# Patient Record
Sex: MALE | Race: WHITE | NOT HISPANIC OR LATINO | URBAN - METROPOLITAN AREA
[De-identification: names, ages, dates, MRNs, and addresses within clinical notes are randomized per-mention and may not be internally consistent; named-entity substitution may affect disease eponyms.]

---

## 2022-01-01 ENCOUNTER — INPATIENT (INPATIENT)
Facility: HOSPITAL | Age: 87
LOS: 5 days | Discharge: EXPIRED | End: 2022-07-21
Attending: STUDENT IN AN ORGANIZED HEALTH CARE EDUCATION/TRAINING PROGRAM | Admitting: STUDENT IN AN ORGANIZED HEALTH CARE EDUCATION/TRAINING PROGRAM

## 2022-01-01 ENCOUNTER — INPATIENT (INPATIENT)
Facility: HOSPITAL | Age: 87
LOS: 8 days | Discharge: NURSING, REHAB OR LONG-TERM CARE | End: 2022-06-29
Attending: INTERNAL MEDICINE | Admitting: INTERNAL MEDICINE
Payer: MEDICARE

## 2022-01-01 VITALS
SYSTOLIC BLOOD PRESSURE: 117 MMHG | OXYGEN SATURATION: 99 % | HEART RATE: 74 BPM | DIASTOLIC BLOOD PRESSURE: 60 MMHG | RESPIRATION RATE: 18 BRPM | TEMPERATURE: 98 F

## 2022-01-01 VITALS
RESPIRATION RATE: 24 BRPM | DIASTOLIC BLOOD PRESSURE: 61 MMHG | SYSTOLIC BLOOD PRESSURE: 138 MMHG | HEIGHT: 61 IN | OXYGEN SATURATION: 98 % | TEMPERATURE: 98 F | HEART RATE: 92 BPM

## 2022-01-01 VITALS
OXYGEN SATURATION: 100 % | DIASTOLIC BLOOD PRESSURE: 67 MMHG | HEART RATE: 97 BPM | RESPIRATION RATE: 18 BRPM | TEMPERATURE: 97 F | SYSTOLIC BLOOD PRESSURE: 137 MMHG

## 2022-01-01 VITALS — HEART RATE: 98 BPM | OXYGEN SATURATION: 100 %

## 2022-01-01 DIAGNOSIS — G93.40 ENCEPHALOPATHY, UNSPECIFIED: ICD-10-CM

## 2022-01-01 DIAGNOSIS — J84.10 PULMONARY FIBROSIS, UNSPECIFIED: ICD-10-CM

## 2022-01-01 DIAGNOSIS — Z71.89 OTHER SPECIFIED COUNSELING: ICD-10-CM

## 2022-01-01 DIAGNOSIS — Z98.49 CATARACT EXTRACTION STATUS, UNSPECIFIED EYE: Chronic | ICD-10-CM

## 2022-01-01 DIAGNOSIS — R10.9 UNSPECIFIED ABDOMINAL PAIN: ICD-10-CM

## 2022-01-01 DIAGNOSIS — Z98.890 OTHER SPECIFIED POSTPROCEDURAL STATES: Chronic | ICD-10-CM

## 2022-01-01 DIAGNOSIS — E46 UNSPECIFIED PROTEIN-CALORIE MALNUTRITION: ICD-10-CM

## 2022-01-01 DIAGNOSIS — L89.321 PRESSURE ULCER OF LEFT BUTTOCK, STAGE 1: ICD-10-CM

## 2022-01-01 DIAGNOSIS — F32.9 MAJOR DEPRESSIVE DISORDER, SINGLE EPISODE, UNSPECIFIED: ICD-10-CM

## 2022-01-01 DIAGNOSIS — N12 TUBULO-INTERSTITIAL NEPHRITIS, NOT SPECIFIED AS ACUTE OR CHRONIC: ICD-10-CM

## 2022-01-01 DIAGNOSIS — R73.9 HYPERGLYCEMIA, UNSPECIFIED: ICD-10-CM

## 2022-01-01 DIAGNOSIS — E11.9 TYPE 2 DIABETES MELLITUS WITHOUT COMPLICATIONS: ICD-10-CM

## 2022-01-01 DIAGNOSIS — J96.01 ACUTE RESPIRATORY FAILURE WITH HYPOXIA: ICD-10-CM

## 2022-01-01 DIAGNOSIS — I10 ESSENTIAL (PRIMARY) HYPERTENSION: ICD-10-CM

## 2022-01-01 DIAGNOSIS — N39.0 URINARY TRACT INFECTION, SITE NOT SPECIFIED: ICD-10-CM

## 2022-01-01 DIAGNOSIS — R13.10 DYSPHAGIA, UNSPECIFIED: ICD-10-CM

## 2022-01-01 DIAGNOSIS — Z29.9 ENCOUNTER FOR PROPHYLACTIC MEASURES, UNSPECIFIED: ICD-10-CM

## 2022-01-01 DIAGNOSIS — E16.2 HYPOGLYCEMIA, UNSPECIFIED: ICD-10-CM

## 2022-01-01 DIAGNOSIS — R53.2 FUNCTIONAL QUADRIPLEGIA: ICD-10-CM

## 2022-01-01 DIAGNOSIS — Z91.89 OTHER SPECIFIED PERSONAL RISK FACTORS, NOT ELSEWHERE CLASSIFIED: ICD-10-CM

## 2022-01-01 DIAGNOSIS — I48.20 CHRONIC ATRIAL FIBRILLATION, UNSPECIFIED: ICD-10-CM

## 2022-01-01 DIAGNOSIS — Z51.5 ENCOUNTER FOR PALLIATIVE CARE: ICD-10-CM

## 2022-01-01 LAB
-  AMIKACIN: SIGNIFICANT CHANGE UP
-  AZTREONAM: SIGNIFICANT CHANGE UP
-  CEFEPIME: SIGNIFICANT CHANGE UP
-  CEFTAZIDIME: SIGNIFICANT CHANGE UP
-  CIPROFLOXACIN: SIGNIFICANT CHANGE UP
-  GENTAMICIN: SIGNIFICANT CHANGE UP
-  IMIPENEM: SIGNIFICANT CHANGE UP
-  LEVOFLOXACIN: SIGNIFICANT CHANGE UP
-  MEROPENEM: SIGNIFICANT CHANGE UP
-  PIPERACILLIN/TAZOBACTAM: SIGNIFICANT CHANGE UP
-  TOBRAMYCIN: SIGNIFICANT CHANGE UP
24R-OH-CALCIDIOL SERPL-MCNC: 29.9 NG/ML — LOW (ref 30–80)
A1C WITH ESTIMATED AVERAGE GLUCOSE RESULT: 6.2 % — HIGH (ref 4–5.6)
A1C WITH ESTIMATED AVERAGE GLUCOSE RESULT: 6.5 % — HIGH (ref 4–5.6)
ALBUMIN SERPL ELPH-MCNC: 2.4 G/DL — LOW (ref 3.3–5)
ALBUMIN SERPL ELPH-MCNC: 2.6 G/DL — LOW (ref 3.3–5)
ALBUMIN SERPL ELPH-MCNC: 2.7 G/DL — LOW (ref 3.3–5)
ALBUMIN SERPL ELPH-MCNC: 2.7 G/DL — LOW (ref 3.3–5)
ALBUMIN SERPL ELPH-MCNC: 2.8 G/DL — LOW (ref 3.3–5)
ALBUMIN SERPL ELPH-MCNC: 2.8 G/DL — LOW (ref 3.3–5)
ALBUMIN SERPL ELPH-MCNC: 2.9 G/DL — LOW (ref 3.3–5)
ALBUMIN SERPL ELPH-MCNC: 3 G/DL — LOW (ref 3.3–5)
ALP SERPL-CCNC: 102 U/L — SIGNIFICANT CHANGE UP (ref 40–120)
ALP SERPL-CCNC: 106 U/L — SIGNIFICANT CHANGE UP (ref 40–120)
ALP SERPL-CCNC: 132 U/L — HIGH (ref 40–120)
ALP SERPL-CCNC: 136 U/L — HIGH (ref 40–120)
ALP SERPL-CCNC: 137 U/L — HIGH (ref 40–120)
ALP SERPL-CCNC: 145 U/L — HIGH (ref 40–120)
ALP SERPL-CCNC: 145 U/L — HIGH (ref 40–120)
ALP SERPL-CCNC: 161 U/L — HIGH (ref 40–120)
ALT FLD-CCNC: 15 U/L — SIGNIFICANT CHANGE UP (ref 4–41)
ALT FLD-CCNC: 22 U/L — SIGNIFICANT CHANGE UP (ref 4–41)
ALT FLD-CCNC: 29 U/L — SIGNIFICANT CHANGE UP (ref 4–41)
ALT FLD-CCNC: 29 U/L — SIGNIFICANT CHANGE UP (ref 4–41)
ALT FLD-CCNC: 30 U/L — SIGNIFICANT CHANGE UP (ref 4–41)
ALT FLD-CCNC: 31 U/L — SIGNIFICANT CHANGE UP (ref 4–41)
ALT FLD-CCNC: 31 U/L — SIGNIFICANT CHANGE UP (ref 4–41)
ALT FLD-CCNC: 37 U/L — SIGNIFICANT CHANGE UP (ref 4–41)
AMMONIA BLD-MCNC: 38 UMOL/L — SIGNIFICANT CHANGE UP (ref 11–55)
ANION GAP SERPL CALC-SCNC: 10 MMOL/L — SIGNIFICANT CHANGE UP (ref 7–14)
ANION GAP SERPL CALC-SCNC: 10 MMOL/L — SIGNIFICANT CHANGE UP (ref 7–14)
ANION GAP SERPL CALC-SCNC: 14 MMOL/L — SIGNIFICANT CHANGE UP (ref 7–14)
ANION GAP SERPL CALC-SCNC: 15 MMOL/L — HIGH (ref 7–14)
ANION GAP SERPL CALC-SCNC: 5 MMOL/L — LOW (ref 7–14)
ANION GAP SERPL CALC-SCNC: 5 MMOL/L — LOW (ref 7–14)
ANION GAP SERPL CALC-SCNC: 6 MMOL/L — LOW (ref 7–14)
ANION GAP SERPL CALC-SCNC: 6 MMOL/L — LOW (ref 7–14)
ANION GAP SERPL CALC-SCNC: 7 MMOL/L — SIGNIFICANT CHANGE UP (ref 7–14)
ANION GAP SERPL CALC-SCNC: 8 MMOL/L — SIGNIFICANT CHANGE UP (ref 7–14)
ANION GAP SERPL CALC-SCNC: 9 MMOL/L — SIGNIFICANT CHANGE UP (ref 7–14)
APPEARANCE UR: ABNORMAL
APPEARANCE UR: CLEAR — SIGNIFICANT CHANGE UP
APTT BLD: 24.1 SEC — LOW (ref 27–36.3)
AST SERPL-CCNC: 10 U/L — SIGNIFICANT CHANGE UP (ref 4–40)
AST SERPL-CCNC: 12 U/L — SIGNIFICANT CHANGE UP (ref 4–40)
AST SERPL-CCNC: 15 U/L — SIGNIFICANT CHANGE UP (ref 4–40)
AST SERPL-CCNC: 15 U/L — SIGNIFICANT CHANGE UP (ref 4–40)
AST SERPL-CCNC: 17 U/L — SIGNIFICANT CHANGE UP (ref 4–40)
AST SERPL-CCNC: 19 U/L — SIGNIFICANT CHANGE UP (ref 4–40)
AST SERPL-CCNC: 23 U/L — SIGNIFICANT CHANGE UP (ref 4–40)
AST SERPL-CCNC: 29 U/L — SIGNIFICANT CHANGE UP (ref 4–40)
BACTERIA # UR AUTO: ABNORMAL
BASE EXCESS BLDA CALC-SCNC: 10 MMOL/L — HIGH (ref -2–3)
BASE EXCESS BLDA CALC-SCNC: 10.1 MMOL/L — HIGH (ref -2–3)
BASE EXCESS BLDA CALC-SCNC: 11.5 MMOL/L — HIGH (ref -2–3)
BASE EXCESS BLDA CALC-SCNC: 8.2 MMOL/L — HIGH (ref -2–3)
BASE EXCESS BLDA CALC-SCNC: 9.8 MMOL/L — HIGH (ref -2–3)
BASE EXCESS BLDV CALC-SCNC: 9.4 MMOL/L — HIGH (ref -2–3)
BASOPHILS # BLD AUTO: 0 K/UL — SIGNIFICANT CHANGE UP (ref 0–0.2)
BASOPHILS # BLD AUTO: 0.01 K/UL — SIGNIFICANT CHANGE UP (ref 0–0.2)
BASOPHILS # BLD AUTO: 0.01 K/UL — SIGNIFICANT CHANGE UP (ref 0–0.2)
BASOPHILS # BLD AUTO: 0.02 K/UL — SIGNIFICANT CHANGE UP (ref 0–0.2)
BASOPHILS # BLD AUTO: 0.04 K/UL — SIGNIFICANT CHANGE UP (ref 0–0.2)
BASOPHILS # BLD AUTO: 0.16 K/UL — SIGNIFICANT CHANGE UP (ref 0–0.2)
BASOPHILS NFR BLD AUTO: 0 % — SIGNIFICANT CHANGE UP (ref 0–2)
BASOPHILS NFR BLD AUTO: 0.1 % — SIGNIFICANT CHANGE UP (ref 0–2)
BASOPHILS NFR BLD AUTO: 0.1 % — SIGNIFICANT CHANGE UP (ref 0–2)
BASOPHILS NFR BLD AUTO: 0.2 % — SIGNIFICANT CHANGE UP (ref 0–2)
BASOPHILS NFR BLD AUTO: 0.3 % — SIGNIFICANT CHANGE UP (ref 0–2)
BASOPHILS NFR BLD AUTO: 1.7 % — SIGNIFICANT CHANGE UP (ref 0–2)
BILIRUB DIRECT SERPL-MCNC: 0.3 MG/DL — SIGNIFICANT CHANGE UP (ref 0–0.3)
BILIRUB INDIRECT FLD-MCNC: 0.5 MG/DL — SIGNIFICANT CHANGE UP (ref 0–1)
BILIRUB SERPL-MCNC: 0.3 MG/DL — SIGNIFICANT CHANGE UP (ref 0.2–1.2)
BILIRUB SERPL-MCNC: 0.4 MG/DL — SIGNIFICANT CHANGE UP (ref 0.2–1.2)
BILIRUB SERPL-MCNC: 0.7 MG/DL — SIGNIFICANT CHANGE UP (ref 0.2–1.2)
BILIRUB SERPL-MCNC: 0.7 MG/DL — SIGNIFICANT CHANGE UP (ref 0.2–1.2)
BILIRUB SERPL-MCNC: 0.8 MG/DL — SIGNIFICANT CHANGE UP (ref 0.2–1.2)
BILIRUB UR-MCNC: NEGATIVE — SIGNIFICANT CHANGE UP
BILIRUB UR-MCNC: NEGATIVE — SIGNIFICANT CHANGE UP
BLD GP AB SCN SERPL QL: NEGATIVE — SIGNIFICANT CHANGE UP
BLD GP AB SCN SERPL QL: NEGATIVE — SIGNIFICANT CHANGE UP
BLOOD GAS COMMENTS ARTERIAL: SIGNIFICANT CHANGE UP
BLOOD GAS VENOUS COMPREHENSIVE RESULT: SIGNIFICANT CHANGE UP
BLOOD GAS VENOUS COMPREHENSIVE RESULT: SIGNIFICANT CHANGE UP
BUN SERPL-MCNC: 10 MG/DL — SIGNIFICANT CHANGE UP (ref 7–23)
BUN SERPL-MCNC: 12 MG/DL — SIGNIFICANT CHANGE UP (ref 7–23)
BUN SERPL-MCNC: 12 MG/DL — SIGNIFICANT CHANGE UP (ref 7–23)
BUN SERPL-MCNC: 13 MG/DL — SIGNIFICANT CHANGE UP (ref 7–23)
BUN SERPL-MCNC: 14 MG/DL — SIGNIFICANT CHANGE UP (ref 7–23)
BUN SERPL-MCNC: 15 MG/DL — SIGNIFICANT CHANGE UP (ref 7–23)
BUN SERPL-MCNC: 15 MG/DL — SIGNIFICANT CHANGE UP (ref 7–23)
BUN SERPL-MCNC: 17 MG/DL — SIGNIFICANT CHANGE UP (ref 7–23)
BUN SERPL-MCNC: 18 MG/DL — SIGNIFICANT CHANGE UP (ref 7–23)
BUN SERPL-MCNC: 20 MG/DL — SIGNIFICANT CHANGE UP (ref 7–23)
BUN SERPL-MCNC: 24 MG/DL — HIGH (ref 7–23)
BUN SERPL-MCNC: 27 MG/DL — HIGH (ref 7–23)
CALCIUM SERPL-MCNC: 8 MG/DL — LOW (ref 8.4–10.5)
CALCIUM SERPL-MCNC: 8.6 MG/DL — SIGNIFICANT CHANGE UP (ref 8.4–10.5)
CALCIUM SERPL-MCNC: 8.6 MG/DL — SIGNIFICANT CHANGE UP (ref 8.4–10.5)
CALCIUM SERPL-MCNC: 8.7 MG/DL — SIGNIFICANT CHANGE UP (ref 8.4–10.5)
CALCIUM SERPL-MCNC: 8.7 MG/DL — SIGNIFICANT CHANGE UP (ref 8.4–10.5)
CALCIUM SERPL-MCNC: 8.8 MG/DL — SIGNIFICANT CHANGE UP (ref 8.4–10.5)
CALCIUM SERPL-MCNC: 8.9 MG/DL — SIGNIFICANT CHANGE UP (ref 8.4–10.5)
CALCIUM SERPL-MCNC: 9 MG/DL — SIGNIFICANT CHANGE UP (ref 8.4–10.5)
CALCIUM SERPL-MCNC: 9 MG/DL — SIGNIFICANT CHANGE UP (ref 8.4–10.5)
CALCIUM SERPL-MCNC: 9.1 MG/DL — SIGNIFICANT CHANGE UP (ref 8.4–10.5)
CALCIUM SERPL-MCNC: 9.1 MG/DL — SIGNIFICANT CHANGE UP (ref 8.4–10.5)
CALCIUM SERPL-MCNC: 9.3 MG/DL — SIGNIFICANT CHANGE UP (ref 8.4–10.5)
CALCIUM SERPL-MCNC: 9.3 MG/DL — SIGNIFICANT CHANGE UP (ref 8.4–10.5)
CALCIUM SERPL-MCNC: 9.4 MG/DL — SIGNIFICANT CHANGE UP (ref 8.4–10.5)
CALCIUM SERPL-MCNC: 9.5 MG/DL — SIGNIFICANT CHANGE UP (ref 8.4–10.5)
CALCIUM SERPL-MCNC: 9.5 MG/DL — SIGNIFICANT CHANGE UP (ref 8.4–10.5)
CHLORIDE BLDV-SCNC: 103 MMOL/L — SIGNIFICANT CHANGE UP (ref 96–108)
CHLORIDE SERPL-SCNC: 100 MMOL/L — SIGNIFICANT CHANGE UP (ref 98–107)
CHLORIDE SERPL-SCNC: 101 MMOL/L — SIGNIFICANT CHANGE UP (ref 98–107)
CHLORIDE SERPL-SCNC: 102 MMOL/L — SIGNIFICANT CHANGE UP (ref 98–107)
CHLORIDE SERPL-SCNC: 102 MMOL/L — SIGNIFICANT CHANGE UP (ref 98–107)
CHLORIDE SERPL-SCNC: 103 MMOL/L — SIGNIFICANT CHANGE UP (ref 98–107)
CHLORIDE SERPL-SCNC: 105 MMOL/L — SIGNIFICANT CHANGE UP (ref 98–107)
CHLORIDE SERPL-SCNC: 95 MMOL/L — LOW (ref 98–107)
CHLORIDE SERPL-SCNC: 96 MMOL/L — LOW (ref 98–107)
CHLORIDE SERPL-SCNC: 96 MMOL/L — LOW (ref 98–107)
CHLORIDE SERPL-SCNC: 98 MMOL/L — SIGNIFICANT CHANGE UP (ref 98–107)
CHLORIDE SERPL-SCNC: 99 MMOL/L — SIGNIFICANT CHANGE UP (ref 98–107)
CHLORIDE SERPL-SCNC: 99 MMOL/L — SIGNIFICANT CHANGE UP (ref 98–107)
CK MB CFR SERPL CALC: 2.2 NG/ML — SIGNIFICANT CHANGE UP
CO2 BLDA-SCNC: 41 MMOL/L — HIGH (ref 19–24)
CO2 BLDA-SCNC: 42 MMOL/L — HIGH (ref 19–24)
CO2 BLDA-SCNC: 42 MMOL/L — HIGH (ref 19–24)
CO2 BLDA-SCNC: 44 MMOL/L — HIGH (ref 19–24)
CO2 BLDA-SCNC: 46 MMOL/L — HIGH (ref 19–24)
CO2 BLDV-SCNC: 36.3 MMOL/L — HIGH (ref 22–26)
CO2 SERPL-SCNC: 27 MMOL/L — SIGNIFICANT CHANGE UP (ref 22–31)
CO2 SERPL-SCNC: 29 MMOL/L — SIGNIFICANT CHANGE UP (ref 22–31)
CO2 SERPL-SCNC: 30 MMOL/L — SIGNIFICANT CHANGE UP (ref 22–31)
CO2 SERPL-SCNC: 31 MMOL/L — SIGNIFICANT CHANGE UP (ref 22–31)
CO2 SERPL-SCNC: 32 MMOL/L — HIGH (ref 22–31)
CO2 SERPL-SCNC: 35 MMOL/L — HIGH (ref 22–31)
CO2 SERPL-SCNC: 35 MMOL/L — HIGH (ref 22–31)
CO2 SERPL-SCNC: 36 MMOL/L — HIGH (ref 22–31)
COLOR SPEC: YELLOW — SIGNIFICANT CHANGE UP
COLOR SPEC: YELLOW — SIGNIFICANT CHANGE UP
CREAT SERPL-MCNC: 0.29 MG/DL — LOW (ref 0.5–1.3)
CREAT SERPL-MCNC: 0.34 MG/DL — LOW (ref 0.5–1.3)
CREAT SERPL-MCNC: 0.37 MG/DL — LOW (ref 0.5–1.3)
CREAT SERPL-MCNC: 0.4 MG/DL — LOW (ref 0.5–1.3)
CREAT SERPL-MCNC: 0.4 MG/DL — LOW (ref 0.5–1.3)
CREAT SERPL-MCNC: 0.47 MG/DL — LOW (ref 0.5–1.3)
CREAT SERPL-MCNC: 0.48 MG/DL — LOW (ref 0.5–1.3)
CREAT SERPL-MCNC: 0.5 MG/DL — SIGNIFICANT CHANGE UP (ref 0.5–1.3)
CREAT SERPL-MCNC: 0.52 MG/DL — SIGNIFICANT CHANGE UP (ref 0.5–1.3)
CREAT SERPL-MCNC: 0.54 MG/DL — SIGNIFICANT CHANGE UP (ref 0.5–1.3)
CREAT SERPL-MCNC: 0.54 MG/DL — SIGNIFICANT CHANGE UP (ref 0.5–1.3)
CREAT SERPL-MCNC: 0.55 MG/DL — SIGNIFICANT CHANGE UP (ref 0.5–1.3)
CREAT SERPL-MCNC: 0.58 MG/DL — SIGNIFICANT CHANGE UP (ref 0.5–1.3)
CREAT SERPL-MCNC: 0.59 MG/DL — SIGNIFICANT CHANGE UP (ref 0.5–1.3)
CREAT SERPL-MCNC: 0.59 MG/DL — SIGNIFICANT CHANGE UP (ref 0.5–1.3)
CREAT SERPL-MCNC: 0.62 MG/DL — SIGNIFICANT CHANGE UP (ref 0.5–1.3)
CULTURE RESULTS: SIGNIFICANT CHANGE UP
DIFF PNL FLD: ABNORMAL
DIFF PNL FLD: NEGATIVE — SIGNIFICANT CHANGE UP
EGFR: 101 ML/MIN/1.73M2 — SIGNIFICANT CHANGE UP
EGFR: 102 ML/MIN/1.73M2 — SIGNIFICANT CHANGE UP
EGFR: 102 ML/MIN/1.73M2 — SIGNIFICANT CHANGE UP
EGFR: 108 ML/MIN/1.73M2 — SIGNIFICANT CHANGE UP
EGFR: 108 ML/MIN/1.73M2 — SIGNIFICANT CHANGE UP
EGFR: 110 ML/MIN/1.73M2 — SIGNIFICANT CHANGE UP
EGFR: 113 ML/MIN/1.73M2 — SIGNIFICANT CHANGE UP
EGFR: 119 ML/MIN/1.73M2 — SIGNIFICANT CHANGE UP
EGFR: 94 ML/MIN/1.73M2 — SIGNIFICANT CHANGE UP
EGFR: 96 ML/MIN/1.73M2 — SIGNIFICANT CHANGE UP
EGFR: 98 ML/MIN/1.73M2 — SIGNIFICANT CHANGE UP
EGFR: 99 ML/MIN/1.73M2 — SIGNIFICANT CHANGE UP
EOSINOPHIL # BLD AUTO: 0 K/UL — SIGNIFICANT CHANGE UP (ref 0–0.5)
EOSINOPHIL # BLD AUTO: 0 K/UL — SIGNIFICANT CHANGE UP (ref 0–0.5)
EOSINOPHIL # BLD AUTO: 0.02 K/UL — SIGNIFICANT CHANGE UP (ref 0–0.5)
EOSINOPHIL # BLD AUTO: 0.04 K/UL — SIGNIFICANT CHANGE UP (ref 0–0.5)
EOSINOPHIL # BLD AUTO: 0.05 K/UL — SIGNIFICANT CHANGE UP (ref 0–0.5)
EOSINOPHIL # BLD AUTO: 0.06 K/UL — SIGNIFICANT CHANGE UP (ref 0–0.5)
EOSINOPHIL # BLD AUTO: 0.07 K/UL — SIGNIFICANT CHANGE UP (ref 0–0.5)
EOSINOPHIL # BLD AUTO: 0.08 K/UL — SIGNIFICANT CHANGE UP (ref 0–0.5)
EOSINOPHIL NFR BLD AUTO: 0 % — SIGNIFICANT CHANGE UP (ref 0–6)
EOSINOPHIL NFR BLD AUTO: 0 % — SIGNIFICANT CHANGE UP (ref 0–6)
EOSINOPHIL NFR BLD AUTO: 0.2 % — SIGNIFICANT CHANGE UP (ref 0–6)
EOSINOPHIL NFR BLD AUTO: 0.4 % — SIGNIFICANT CHANGE UP (ref 0–6)
EOSINOPHIL NFR BLD AUTO: 0.6 % — SIGNIFICANT CHANGE UP (ref 0–6)
EOSINOPHIL NFR BLD AUTO: 0.6 % — SIGNIFICANT CHANGE UP (ref 0–6)
EOSINOPHIL NFR BLD AUTO: 0.8 % — SIGNIFICANT CHANGE UP (ref 0–6)
EOSINOPHIL NFR BLD AUTO: 0.8 % — SIGNIFICANT CHANGE UP (ref 0–6)
EPI CELLS # UR: 1 /HPF — SIGNIFICANT CHANGE UP (ref 0–5)
ESTIMATED AVERAGE GLUCOSE: 131 — SIGNIFICANT CHANGE UP
ESTIMATED AVERAGE GLUCOSE: 140 — SIGNIFICANT CHANGE UP
FLUAV AG NPH QL: SIGNIFICANT CHANGE UP
FLUBV AG NPH QL: SIGNIFICANT CHANGE UP
GAS PNL BLDA: SIGNIFICANT CHANGE UP
GAS PNL BLDA: SIGNIFICANT CHANGE UP
GAS PNL BLDV: 139 MMOL/L — SIGNIFICANT CHANGE UP (ref 136–145)
GIANT PLATELETS BLD QL SMEAR: PRESENT — SIGNIFICANT CHANGE UP
GLUCOSE BLDC GLUCOMTR-MCNC: 102 MG/DL — HIGH (ref 70–99)
GLUCOSE BLDC GLUCOMTR-MCNC: 103 MG/DL — HIGH (ref 70–99)
GLUCOSE BLDC GLUCOMTR-MCNC: 105 MG/DL — HIGH (ref 70–99)
GLUCOSE BLDC GLUCOMTR-MCNC: 106 MG/DL — HIGH (ref 70–99)
GLUCOSE BLDC GLUCOMTR-MCNC: 107 MG/DL — HIGH (ref 70–99)
GLUCOSE BLDC GLUCOMTR-MCNC: 108 MG/DL — HIGH (ref 70–99)
GLUCOSE BLDC GLUCOMTR-MCNC: 111 MG/DL — HIGH (ref 70–99)
GLUCOSE BLDC GLUCOMTR-MCNC: 112 MG/DL — HIGH (ref 70–99)
GLUCOSE BLDC GLUCOMTR-MCNC: 116 MG/DL — HIGH (ref 70–99)
GLUCOSE BLDC GLUCOMTR-MCNC: 118 MG/DL — HIGH (ref 70–99)
GLUCOSE BLDC GLUCOMTR-MCNC: 127 MG/DL — HIGH (ref 70–99)
GLUCOSE BLDC GLUCOMTR-MCNC: 128 MG/DL — HIGH (ref 70–99)
GLUCOSE BLDC GLUCOMTR-MCNC: 132 MG/DL — HIGH (ref 70–99)
GLUCOSE BLDC GLUCOMTR-MCNC: 133 MG/DL — HIGH (ref 70–99)
GLUCOSE BLDC GLUCOMTR-MCNC: 137 MG/DL — HIGH (ref 70–99)
GLUCOSE BLDC GLUCOMTR-MCNC: 140 MG/DL — HIGH (ref 70–99)
GLUCOSE BLDC GLUCOMTR-MCNC: 141 MG/DL — HIGH (ref 70–99)
GLUCOSE BLDC GLUCOMTR-MCNC: 142 MG/DL — HIGH (ref 70–99)
GLUCOSE BLDC GLUCOMTR-MCNC: 143 MG/DL — HIGH (ref 70–99)
GLUCOSE BLDC GLUCOMTR-MCNC: 145 MG/DL — HIGH (ref 70–99)
GLUCOSE BLDC GLUCOMTR-MCNC: 146 MG/DL — HIGH (ref 70–99)
GLUCOSE BLDC GLUCOMTR-MCNC: 158 MG/DL — HIGH (ref 70–99)
GLUCOSE BLDC GLUCOMTR-MCNC: 159 MG/DL — HIGH (ref 70–99)
GLUCOSE BLDC GLUCOMTR-MCNC: 164 MG/DL — HIGH (ref 70–99)
GLUCOSE BLDC GLUCOMTR-MCNC: 165 MG/DL — HIGH (ref 70–99)
GLUCOSE BLDC GLUCOMTR-MCNC: 167 MG/DL — HIGH (ref 70–99)
GLUCOSE BLDC GLUCOMTR-MCNC: 170 MG/DL — HIGH (ref 70–99)
GLUCOSE BLDC GLUCOMTR-MCNC: 170 MG/DL — HIGH (ref 70–99)
GLUCOSE BLDC GLUCOMTR-MCNC: 175 MG/DL — HIGH (ref 70–99)
GLUCOSE BLDC GLUCOMTR-MCNC: 179 MG/DL — HIGH (ref 70–99)
GLUCOSE BLDC GLUCOMTR-MCNC: 180 MG/DL — HIGH (ref 70–99)
GLUCOSE BLDC GLUCOMTR-MCNC: 181 MG/DL — HIGH (ref 70–99)
GLUCOSE BLDC GLUCOMTR-MCNC: 182 MG/DL — HIGH (ref 70–99)
GLUCOSE BLDC GLUCOMTR-MCNC: 188 MG/DL — HIGH (ref 70–99)
GLUCOSE BLDC GLUCOMTR-MCNC: 189 MG/DL — HIGH (ref 70–99)
GLUCOSE BLDC GLUCOMTR-MCNC: 191 MG/DL — HIGH (ref 70–99)
GLUCOSE BLDC GLUCOMTR-MCNC: 196 MG/DL — HIGH (ref 70–99)
GLUCOSE BLDC GLUCOMTR-MCNC: 199 MG/DL — HIGH (ref 70–99)
GLUCOSE BLDC GLUCOMTR-MCNC: 201 MG/DL — HIGH (ref 70–99)
GLUCOSE BLDC GLUCOMTR-MCNC: 203 MG/DL — HIGH (ref 70–99)
GLUCOSE BLDC GLUCOMTR-MCNC: 204 MG/DL — HIGH (ref 70–99)
GLUCOSE BLDC GLUCOMTR-MCNC: 206 MG/DL — HIGH (ref 70–99)
GLUCOSE BLDC GLUCOMTR-MCNC: 206 MG/DL — HIGH (ref 70–99)
GLUCOSE BLDC GLUCOMTR-MCNC: 211 MG/DL — HIGH (ref 70–99)
GLUCOSE BLDC GLUCOMTR-MCNC: 216 MG/DL — HIGH (ref 70–99)
GLUCOSE BLDC GLUCOMTR-MCNC: 217 MG/DL — HIGH (ref 70–99)
GLUCOSE BLDC GLUCOMTR-MCNC: 218 MG/DL — HIGH (ref 70–99)
GLUCOSE BLDC GLUCOMTR-MCNC: 219 MG/DL — HIGH (ref 70–99)
GLUCOSE BLDC GLUCOMTR-MCNC: 220 MG/DL — HIGH (ref 70–99)
GLUCOSE BLDC GLUCOMTR-MCNC: 223 MG/DL — HIGH (ref 70–99)
GLUCOSE BLDC GLUCOMTR-MCNC: 233 MG/DL — HIGH (ref 70–99)
GLUCOSE BLDC GLUCOMTR-MCNC: 244 MG/DL — HIGH (ref 70–99)
GLUCOSE BLDC GLUCOMTR-MCNC: 251 MG/DL — HIGH (ref 70–99)
GLUCOSE BLDC GLUCOMTR-MCNC: 254 MG/DL — HIGH (ref 70–99)
GLUCOSE BLDC GLUCOMTR-MCNC: 256 MG/DL — HIGH (ref 70–99)
GLUCOSE BLDC GLUCOMTR-MCNC: 275 MG/DL — HIGH (ref 70–99)
GLUCOSE BLDC GLUCOMTR-MCNC: 281 MG/DL — HIGH (ref 70–99)
GLUCOSE BLDC GLUCOMTR-MCNC: 288 MG/DL — HIGH (ref 70–99)
GLUCOSE BLDC GLUCOMTR-MCNC: 311 MG/DL — HIGH (ref 70–99)
GLUCOSE BLDC GLUCOMTR-MCNC: 311 MG/DL — HIGH (ref 70–99)
GLUCOSE BLDC GLUCOMTR-MCNC: 316 MG/DL — HIGH (ref 70–99)
GLUCOSE BLDC GLUCOMTR-MCNC: 321 MG/DL — HIGH (ref 70–99)
GLUCOSE BLDC GLUCOMTR-MCNC: 38 MG/DL — CRITICAL LOW (ref 70–99)
GLUCOSE BLDC GLUCOMTR-MCNC: 40 MG/DL — CRITICAL LOW (ref 70–99)
GLUCOSE BLDC GLUCOMTR-MCNC: 83 MG/DL — SIGNIFICANT CHANGE UP (ref 70–99)
GLUCOSE BLDV-MCNC: 62 MG/DL — LOW (ref 70–99)
GLUCOSE SERPL-MCNC: 110 MG/DL — HIGH (ref 70–99)
GLUCOSE SERPL-MCNC: 114 MG/DL — HIGH (ref 70–99)
GLUCOSE SERPL-MCNC: 117 MG/DL — HIGH (ref 70–99)
GLUCOSE SERPL-MCNC: 120 MG/DL — HIGH (ref 70–99)
GLUCOSE SERPL-MCNC: 133 MG/DL — HIGH (ref 70–99)
GLUCOSE SERPL-MCNC: 146 MG/DL — HIGH (ref 70–99)
GLUCOSE SERPL-MCNC: 147 MG/DL — HIGH (ref 70–99)
GLUCOSE SERPL-MCNC: 147 MG/DL — HIGH (ref 70–99)
GLUCOSE SERPL-MCNC: 155 MG/DL — HIGH (ref 70–99)
GLUCOSE SERPL-MCNC: 164 MG/DL — HIGH (ref 70–99)
GLUCOSE SERPL-MCNC: 171 MG/DL — HIGH (ref 70–99)
GLUCOSE SERPL-MCNC: 212 MG/DL — HIGH (ref 70–99)
GLUCOSE SERPL-MCNC: 219 MG/DL — HIGH (ref 70–99)
GLUCOSE SERPL-MCNC: 232 MG/DL — HIGH (ref 70–99)
GLUCOSE SERPL-MCNC: 599 MG/DL — CRITICAL HIGH (ref 70–99)
GLUCOSE SERPL-MCNC: 67 MG/DL — LOW (ref 70–99)
GLUCOSE SERPL-MCNC: 79 MG/DL — SIGNIFICANT CHANGE UP (ref 70–99)
GLUCOSE SERPL-MCNC: 83 MG/DL — SIGNIFICANT CHANGE UP (ref 70–99)
GLUCOSE UR QL: ABNORMAL
GLUCOSE UR QL: ABNORMAL
GRAM STN FLD: SIGNIFICANT CHANGE UP
HCO3 BLDA-SCNC: 39 MMOL/L — HIGH (ref 21–28)
HCO3 BLDA-SCNC: 39 MMOL/L — HIGH (ref 21–28)
HCO3 BLDA-SCNC: 40 MMOL/L — HIGH (ref 21–28)
HCO3 BLDA-SCNC: 40 MMOL/L — HIGH (ref 21–28)
HCO3 BLDA-SCNC: 42 MMOL/L — HIGH (ref 21–28)
HCO3 BLDV-SCNC: 35 MMOL/L — HIGH (ref 22–29)
HCT VFR BLD CALC: 30.6 % — LOW (ref 39–50)
HCT VFR BLD CALC: 31.3 % — LOW (ref 39–50)
HCT VFR BLD CALC: 32.4 % — LOW (ref 39–50)
HCT VFR BLD CALC: 32.5 % — LOW (ref 39–50)
HCT VFR BLD CALC: 32.9 % — LOW (ref 39–50)
HCT VFR BLD CALC: 33.1 % — LOW (ref 39–50)
HCT VFR BLD CALC: 33.8 % — LOW (ref 39–50)
HCT VFR BLD CALC: 34 % — LOW (ref 39–50)
HCT VFR BLD CALC: 34.1 % — LOW (ref 39–50)
HCT VFR BLD CALC: 34.5 % — LOW (ref 39–50)
HCT VFR BLD CALC: 34.7 % — LOW (ref 39–50)
HCT VFR BLD CALC: 34.9 % — LOW (ref 39–50)
HCT VFR BLD CALC: 35.3 % — LOW (ref 39–50)
HCT VFR BLD CALC: 35.5 % — LOW (ref 39–50)
HCT VFR BLD CALC: 35.5 % — LOW (ref 39–50)
HCT VFR BLD CALC: 35.7 % — LOW (ref 39–50)
HCT VFR BLD CALC: 36.5 % — LOW (ref 39–50)
HCT VFR BLDA CALC: 34 % — LOW (ref 39–51)
HGB BLD CALC-MCNC: 11.2 G/DL — LOW (ref 13–17)
HGB BLD-MCNC: 10.3 G/DL — LOW (ref 13–17)
HGB BLD-MCNC: 10.4 G/DL — LOW (ref 13–17)
HGB BLD-MCNC: 10.7 G/DL — LOW (ref 13–17)
HGB BLD-MCNC: 10.8 G/DL — LOW (ref 13–17)
HGB BLD-MCNC: 10.8 G/DL — LOW (ref 13–17)
HGB BLD-MCNC: 10.9 G/DL — LOW (ref 13–17)
HGB BLD-MCNC: 10.9 G/DL — LOW (ref 13–17)
HGB BLD-MCNC: 11 G/DL — LOW (ref 13–17)
HGB BLD-MCNC: 11.3 G/DL — LOW (ref 13–17)
HGB BLD-MCNC: 11.4 G/DL — LOW (ref 13–17)
HGB BLD-MCNC: 11.4 G/DL — LOW (ref 13–17)
HGB BLD-MCNC: 11.6 G/DL — LOW (ref 13–17)
HGB BLD-MCNC: 11.6 G/DL — LOW (ref 13–17)
HGB BLD-MCNC: 11.8 G/DL — LOW (ref 13–17)
HGB BLD-MCNC: 9.6 G/DL — LOW (ref 13–17)
HGB BLD-MCNC: 9.6 G/DL — LOW (ref 13–17)
HGB BLD-MCNC: 9.9 G/DL — LOW (ref 13–17)
HYALINE CASTS # UR AUTO: 4 /LPF — SIGNIFICANT CHANGE UP (ref 0–7)
IANC: 11.39 K/UL — HIGH (ref 1.8–7.4)
IANC: 12.45 K/UL — HIGH (ref 1.8–7.4)
IANC: 17.05 K/UL — HIGH (ref 1.8–7.4)
IANC: 4.7 K/UL — SIGNIFICANT CHANGE UP (ref 1.8–7.4)
IANC: 6.94 K/UL — SIGNIFICANT CHANGE UP (ref 1.8–7.4)
IANC: 7.17 K/UL — SIGNIFICANT CHANGE UP (ref 1.8–7.4)
IANC: 8.79 K/UL — HIGH (ref 1.8–7.4)
IANC: 9.05 K/UL — HIGH (ref 1.8–7.4)
IMM GRANULOCYTES NFR BLD AUTO: 0.8 % — SIGNIFICANT CHANGE UP (ref 0–1.5)
IMM GRANULOCYTES NFR BLD AUTO: 1.1 % — SIGNIFICANT CHANGE UP (ref 0–1.5)
IMM GRANULOCYTES NFR BLD AUTO: 1.1 % — SIGNIFICANT CHANGE UP (ref 0–1.5)
IMM GRANULOCYTES NFR BLD AUTO: 1.2 % — SIGNIFICANT CHANGE UP (ref 0–1.5)
IMM GRANULOCYTES NFR BLD AUTO: 1.5 % — SIGNIFICANT CHANGE UP (ref 0–1.5)
IMM GRANULOCYTES NFR BLD AUTO: 4.5 % — HIGH (ref 0–1.5)
INR BLD: 1.19 RATIO — HIGH (ref 0.88–1.16)
INR BLD: 1.2 RATIO — HIGH (ref 0.88–1.16)
KETONES UR-MCNC: NEGATIVE — SIGNIFICANT CHANGE UP
KETONES UR-MCNC: NEGATIVE — SIGNIFICANT CHANGE UP
LACTATE BLDV-MCNC: 0.6 MMOL/L — SIGNIFICANT CHANGE UP (ref 0.5–2)
LACTATE SERPL-SCNC: 1.8 MMOL/L — SIGNIFICANT CHANGE UP (ref 0.5–2)
LEUKOCYTE ESTERASE UR-ACNC: ABNORMAL
LEUKOCYTE ESTERASE UR-ACNC: NEGATIVE — SIGNIFICANT CHANGE UP
LIDOCAIN IGE QN: 11 U/L — SIGNIFICANT CHANGE UP (ref 7–60)
LIDOCAIN IGE QN: 12 U/L — SIGNIFICANT CHANGE UP (ref 7–60)
LYMPHOCYTES # BLD AUTO: 0.14 K/UL — LOW (ref 1–3.3)
LYMPHOCYTES # BLD AUTO: 0.32 K/UL — LOW (ref 1–3.3)
LYMPHOCYTES # BLD AUTO: 0.53 K/UL — LOW (ref 1–3.3)
LYMPHOCYTES # BLD AUTO: 0.62 K/UL — LOW (ref 1–3.3)
LYMPHOCYTES # BLD AUTO: 0.63 K/UL — LOW (ref 1–3.3)
LYMPHOCYTES # BLD AUTO: 0.87 K/UL — LOW (ref 1–3.3)
LYMPHOCYTES # BLD AUTO: 0.91 K/UL — LOW (ref 1–3.3)
LYMPHOCYTES # BLD AUTO: 1 % — LOW (ref 13–44)
LYMPHOCYTES # BLD AUTO: 1.59 K/UL — SIGNIFICANT CHANGE UP (ref 1–3.3)
LYMPHOCYTES # BLD AUTO: 13.3 % — SIGNIFICANT CHANGE UP (ref 13–44)
LYMPHOCYTES # BLD AUTO: 14 % — SIGNIFICANT CHANGE UP (ref 13–44)
LYMPHOCYTES # BLD AUTO: 3.3 % — LOW (ref 13–44)
LYMPHOCYTES # BLD AUTO: 3.4 % — LOW (ref 13–44)
LYMPHOCYTES # BLD AUTO: 5 % — LOW (ref 13–44)
LYMPHOCYTES # BLD AUTO: 5.2 % — LOW (ref 13–44)
LYMPHOCYTES # BLD AUTO: 9.5 % — LOW (ref 13–44)
MACROCYTES BLD QL: SLIGHT — SIGNIFICANT CHANGE UP
MAGNESIUM SERPL-MCNC: 1.6 MG/DL — SIGNIFICANT CHANGE UP (ref 1.6–2.6)
MAGNESIUM SERPL-MCNC: 1.6 MG/DL — SIGNIFICANT CHANGE UP (ref 1.6–2.6)
MAGNESIUM SERPL-MCNC: 1.7 MG/DL — SIGNIFICANT CHANGE UP (ref 1.6–2.6)
MAGNESIUM SERPL-MCNC: 1.8 MG/DL — SIGNIFICANT CHANGE UP (ref 1.6–2.6)
MAGNESIUM SERPL-MCNC: 1.8 MG/DL — SIGNIFICANT CHANGE UP (ref 1.6–2.6)
MAGNESIUM SERPL-MCNC: 1.9 MG/DL — SIGNIFICANT CHANGE UP (ref 1.6–2.6)
MAGNESIUM SERPL-MCNC: 2 MG/DL — SIGNIFICANT CHANGE UP (ref 1.6–2.6)
MAGNESIUM SERPL-MCNC: 2.1 MG/DL — SIGNIFICANT CHANGE UP (ref 1.6–2.6)
MAGNESIUM SERPL-MCNC: 2.1 MG/DL — SIGNIFICANT CHANGE UP (ref 1.6–2.6)
MANUAL SMEAR VERIFICATION: SIGNIFICANT CHANGE UP
MCHC RBC-ENTMCNC: 29.2 GM/DL — LOW (ref 32–36)
MCHC RBC-ENTMCNC: 29.6 GM/DL — LOW (ref 32–36)
MCHC RBC-ENTMCNC: 29.8 PG — SIGNIFICANT CHANGE UP (ref 27–34)
MCHC RBC-ENTMCNC: 29.9 PG — SIGNIFICANT CHANGE UP (ref 27–34)
MCHC RBC-ENTMCNC: 30.1 GM/DL — LOW (ref 32–36)
MCHC RBC-ENTMCNC: 30.2 PG — SIGNIFICANT CHANGE UP (ref 27–34)
MCHC RBC-ENTMCNC: 30.4 PG — SIGNIFICANT CHANGE UP (ref 27–34)
MCHC RBC-ENTMCNC: 30.5 PG — SIGNIFICANT CHANGE UP (ref 27–34)
MCHC RBC-ENTMCNC: 30.6 PG — SIGNIFICANT CHANGE UP (ref 27–34)
MCHC RBC-ENTMCNC: 30.7 PG — SIGNIFICANT CHANGE UP (ref 27–34)
MCHC RBC-ENTMCNC: 30.8 GM/DL — LOW (ref 32–36)
MCHC RBC-ENTMCNC: 30.8 PG — SIGNIFICANT CHANGE UP (ref 27–34)
MCHC RBC-ENTMCNC: 30.8 PG — SIGNIFICANT CHANGE UP (ref 27–34)
MCHC RBC-ENTMCNC: 30.9 PG — SIGNIFICANT CHANGE UP (ref 27–34)
MCHC RBC-ENTMCNC: 30.9 PG — SIGNIFICANT CHANGE UP (ref 27–34)
MCHC RBC-ENTMCNC: 31.1 PG — SIGNIFICANT CHANGE UP (ref 27–34)
MCHC RBC-ENTMCNC: 31.3 PG — SIGNIFICANT CHANGE UP (ref 27–34)
MCHC RBC-ENTMCNC: 31.5 PG — SIGNIFICANT CHANGE UP (ref 27–34)
MCHC RBC-ENTMCNC: 31.6 GM/DL — LOW (ref 32–36)
MCHC RBC-ENTMCNC: 31.6 GM/DL — LOW (ref 32–36)
MCHC RBC-ENTMCNC: 31.7 PG — SIGNIFICANT CHANGE UP (ref 27–34)
MCHC RBC-ENTMCNC: 31.8 GM/DL — LOW (ref 32–36)
MCHC RBC-ENTMCNC: 31.8 GM/DL — LOW (ref 32–36)
MCHC RBC-ENTMCNC: 31.8 PG — SIGNIFICANT CHANGE UP (ref 27–34)
MCHC RBC-ENTMCNC: 31.9 GM/DL — LOW (ref 32–36)
MCHC RBC-ENTMCNC: 32.1 GM/DL — SIGNIFICANT CHANGE UP (ref 32–36)
MCHC RBC-ENTMCNC: 32.3 GM/DL — SIGNIFICANT CHANGE UP (ref 32–36)
MCHC RBC-ENTMCNC: 32.3 GM/DL — SIGNIFICANT CHANGE UP (ref 32–36)
MCHC RBC-ENTMCNC: 32.4 GM/DL — SIGNIFICANT CHANGE UP (ref 32–36)
MCHC RBC-ENTMCNC: 32.6 GM/DL — SIGNIFICANT CHANGE UP (ref 32–36)
MCHC RBC-ENTMCNC: 32.9 GM/DL — SIGNIFICANT CHANGE UP (ref 32–36)
MCHC RBC-ENTMCNC: 32.9 PG — SIGNIFICANT CHANGE UP (ref 27–34)
MCHC RBC-ENTMCNC: 33.7 GM/DL — SIGNIFICANT CHANGE UP (ref 32–36)
MCHC RBC-ENTMCNC: 34 GM/DL — SIGNIFICANT CHANGE UP (ref 32–36)
MCV RBC AUTO: 100.3 FL — HIGH (ref 80–100)
MCV RBC AUTO: 101.7 FL — HIGH (ref 80–100)
MCV RBC AUTO: 106.2 FL — HIGH (ref 80–100)
MCV RBC AUTO: 107.2 FL — HIGH (ref 80–100)
MCV RBC AUTO: 93.2 FL — SIGNIFICANT CHANGE UP (ref 80–100)
MCV RBC AUTO: 93.7 FL — SIGNIFICANT CHANGE UP (ref 80–100)
MCV RBC AUTO: 93.8 FL — SIGNIFICANT CHANGE UP (ref 80–100)
MCV RBC AUTO: 93.9 FL — SIGNIFICANT CHANGE UP (ref 80–100)
MCV RBC AUTO: 94.4 FL — SIGNIFICANT CHANGE UP (ref 80–100)
MCV RBC AUTO: 94.4 FL — SIGNIFICANT CHANGE UP (ref 80–100)
MCV RBC AUTO: 94.6 FL — SIGNIFICANT CHANGE UP (ref 80–100)
MCV RBC AUTO: 94.7 FL — SIGNIFICANT CHANGE UP (ref 80–100)
MCV RBC AUTO: 95.1 FL — SIGNIFICANT CHANGE UP (ref 80–100)
MCV RBC AUTO: 96.1 FL — SIGNIFICANT CHANGE UP (ref 80–100)
MCV RBC AUTO: 96.2 FL — SIGNIFICANT CHANGE UP (ref 80–100)
MCV RBC AUTO: 96.7 FL — SIGNIFICANT CHANGE UP (ref 80–100)
MCV RBC AUTO: 97.5 FL — SIGNIFICANT CHANGE UP (ref 80–100)
MCV RBC AUTO: 97.5 FL — SIGNIFICANT CHANGE UP (ref 80–100)
MCV RBC AUTO: 98 FL — SIGNIFICANT CHANGE UP (ref 80–100)
METHOD TYPE: SIGNIFICANT CHANGE UP
MONOCYTES # BLD AUTO: 0.48 K/UL — SIGNIFICANT CHANGE UP (ref 0–0.9)
MONOCYTES # BLD AUTO: 0.51 K/UL — SIGNIFICANT CHANGE UP (ref 0–0.9)
MONOCYTES # BLD AUTO: 0.72 K/UL — SIGNIFICANT CHANGE UP (ref 0–0.9)
MONOCYTES # BLD AUTO: 0.82 K/UL — SIGNIFICANT CHANGE UP (ref 0–0.9)
MONOCYTES # BLD AUTO: 0.89 K/UL — SIGNIFICANT CHANGE UP (ref 0–0.9)
MONOCYTES # BLD AUTO: 0.96 K/UL — HIGH (ref 0–0.9)
MONOCYTES # BLD AUTO: 0.97 K/UL — HIGH (ref 0–0.9)
MONOCYTES # BLD AUTO: 0.98 K/UL — HIGH (ref 0–0.9)
MONOCYTES NFR BLD AUTO: 10.2 % — SIGNIFICANT CHANGE UP (ref 2–14)
MONOCYTES NFR BLD AUTO: 11.1 % — SIGNIFICANT CHANGE UP (ref 2–14)
MONOCYTES NFR BLD AUTO: 4 % — SIGNIFICANT CHANGE UP (ref 2–14)
MONOCYTES NFR BLD AUTO: 4.7 % — SIGNIFICANT CHANGE UP (ref 2–14)
MONOCYTES NFR BLD AUTO: 5.1 % — SIGNIFICANT CHANGE UP (ref 2–14)
MONOCYTES NFR BLD AUTO: 6 % — SIGNIFICANT CHANGE UP (ref 2–14)
MONOCYTES NFR BLD AUTO: 8.2 % — SIGNIFICANT CHANGE UP (ref 2–14)
MONOCYTES NFR BLD AUTO: 9.7 % — SIGNIFICANT CHANGE UP (ref 2–14)
MRSA PCR RESULT.: SIGNIFICANT CHANGE UP
MYELOCYTES NFR BLD: 1.7 % — HIGH (ref 0–0)
NEUTROPHILS # BLD AUTO: 11.39 K/UL — HIGH (ref 1.8–7.4)
NEUTROPHILS # BLD AUTO: 12.67 K/UL — HIGH (ref 1.8–7.4)
NEUTROPHILS # BLD AUTO: 17.05 K/UL — HIGH (ref 1.8–7.4)
NEUTROPHILS # BLD AUTO: 4.7 K/UL — SIGNIFICANT CHANGE UP (ref 1.8–7.4)
NEUTROPHILS # BLD AUTO: 7.17 K/UL — SIGNIFICANT CHANGE UP (ref 1.8–7.4)
NEUTROPHILS # BLD AUTO: 7.76 K/UL — HIGH (ref 1.8–7.4)
NEUTROPHILS # BLD AUTO: 8.79 K/UL — HIGH (ref 1.8–7.4)
NEUTROPHILS # BLD AUTO: 9.05 K/UL — HIGH (ref 1.8–7.4)
NEUTROPHILS NFR BLD AUTO: 72.6 % — SIGNIFICANT CHANGE UP (ref 43–77)
NEUTROPHILS NFR BLD AUTO: 73.5 % — SIGNIFICANT CHANGE UP (ref 43–77)
NEUTROPHILS NFR BLD AUTO: 78.6 % — HIGH (ref 43–77)
NEUTROPHILS NFR BLD AUTO: 82.2 % — HIGH (ref 43–77)
NEUTROPHILS NFR BLD AUTO: 88.3 % — HIGH (ref 43–77)
NEUTROPHILS NFR BLD AUTO: 89.6 % — HIGH (ref 43–77)
NEUTROPHILS NFR BLD AUTO: 90.4 % — HIGH (ref 43–77)
NEUTROPHILS NFR BLD AUTO: 93 % — HIGH (ref 43–77)
NITRITE UR-MCNC: NEGATIVE — SIGNIFICANT CHANGE UP
NITRITE UR-MCNC: POSITIVE
NRBC # BLD: 0 /100 WBCS — SIGNIFICANT CHANGE UP
NRBC # BLD: 0 /100 — SIGNIFICANT CHANGE UP (ref 0–0)
NRBC # BLD: 1 /100 — HIGH (ref 0–0)
NRBC # FLD: 0 K/UL — SIGNIFICANT CHANGE UP
OB PNL STL: POSITIVE
OB PNL STL: POSITIVE
ORGANISM # SPEC MICROSCOPIC CNT: SIGNIFICANT CHANGE UP
ORGANISM # SPEC MICROSCOPIC CNT: SIGNIFICANT CHANGE UP
PCO2 BLDA: 103 MMHG — CRITICAL HIGH (ref 35–48)
PCO2 BLDA: 106 MMHG — CRITICAL HIGH (ref 35–48)
PCO2 BLDA: 71 MMHG — CRITICAL HIGH (ref 35–48)
PCO2 BLDA: 74 MMHG — CRITICAL HIGH (ref 35–48)
PCO2 BLDA: 78 MMHG — CRITICAL HIGH (ref 35–48)
PCO2 BLDV: 50 MMHG — SIGNIFICANT CHANGE UP (ref 42–55)
PH BLDA: 7.2 — CRITICAL LOW (ref 7.35–7.45)
PH BLDA: 7.21 — LOW (ref 7.35–7.45)
PH BLDA: 7.31 — LOW (ref 7.35–7.45)
PH BLDA: 7.33 — LOW (ref 7.35–7.45)
PH BLDA: 7.36 — SIGNIFICANT CHANGE UP (ref 7.35–7.45)
PH BLDV: 7.45 — HIGH (ref 7.32–7.43)
PH UR: 6.5 — SIGNIFICANT CHANGE UP (ref 5–8)
PH UR: 8 — SIGNIFICANT CHANGE UP (ref 5–8)
PHOSPHATE SERPL-MCNC: 1.3 MG/DL — LOW (ref 2.5–4.5)
PHOSPHATE SERPL-MCNC: 2.1 MG/DL — LOW (ref 2.5–4.5)
PHOSPHATE SERPL-MCNC: 2.2 MG/DL — LOW (ref 2.5–4.5)
PHOSPHATE SERPL-MCNC: 2.2 MG/DL — LOW (ref 2.5–4.5)
PHOSPHATE SERPL-MCNC: 2.3 MG/DL — LOW (ref 2.5–4.5)
PHOSPHATE SERPL-MCNC: 2.4 MG/DL — LOW (ref 2.5–4.5)
PHOSPHATE SERPL-MCNC: 2.6 MG/DL — SIGNIFICANT CHANGE UP (ref 2.5–4.5)
PHOSPHATE SERPL-MCNC: 2.7 MG/DL — SIGNIFICANT CHANGE UP (ref 2.5–4.5)
PHOSPHATE SERPL-MCNC: 2.7 MG/DL — SIGNIFICANT CHANGE UP (ref 2.5–4.5)
PHOSPHATE SERPL-MCNC: 3 MG/DL — SIGNIFICANT CHANGE UP (ref 2.5–4.5)
PHOSPHATE SERPL-MCNC: 3.6 MG/DL — SIGNIFICANT CHANGE UP (ref 2.5–4.5)
PHOSPHATE SERPL-MCNC: 3.6 MG/DL — SIGNIFICANT CHANGE UP (ref 2.5–4.5)
PHOSPHATE SERPL-MCNC: 3.9 MG/DL — SIGNIFICANT CHANGE UP (ref 2.5–4.5)
PLAT MORPH BLD: ABNORMAL
PLAT MORPH BLD: NORMAL — SIGNIFICANT CHANGE UP
PLATELET # BLD AUTO: 145 K/UL — LOW (ref 150–400)
PLATELET # BLD AUTO: 147 K/UL — LOW (ref 150–400)
PLATELET # BLD AUTO: 151 K/UL — SIGNIFICANT CHANGE UP (ref 150–400)
PLATELET # BLD AUTO: 163 K/UL — SIGNIFICANT CHANGE UP (ref 150–400)
PLATELET # BLD AUTO: 164 K/UL — SIGNIFICANT CHANGE UP (ref 150–400)
PLATELET # BLD AUTO: 170 K/UL — SIGNIFICANT CHANGE UP (ref 150–400)
PLATELET # BLD AUTO: 182 K/UL — SIGNIFICANT CHANGE UP (ref 150–400)
PLATELET # BLD AUTO: 201 K/UL — SIGNIFICANT CHANGE UP (ref 150–400)
PLATELET # BLD AUTO: 211 K/UL — SIGNIFICANT CHANGE UP (ref 150–400)
PLATELET # BLD AUTO: 219 K/UL — SIGNIFICANT CHANGE UP (ref 150–400)
PLATELET # BLD AUTO: 228 K/UL — SIGNIFICANT CHANGE UP (ref 150–400)
PLATELET # BLD AUTO: 230 K/UL — SIGNIFICANT CHANGE UP (ref 150–400)
PLATELET # BLD AUTO: 231 K/UL — SIGNIFICANT CHANGE UP (ref 150–400)
PLATELET # BLD AUTO: 276 K/UL — SIGNIFICANT CHANGE UP (ref 150–400)
PLATELET # BLD AUTO: 282 K/UL — SIGNIFICANT CHANGE UP (ref 150–400)
PLATELET # BLD AUTO: 285 K/UL — SIGNIFICANT CHANGE UP (ref 150–400)
PLATELET # BLD AUTO: 287 K/UL — SIGNIFICANT CHANGE UP (ref 150–400)
PLATELET # BLD AUTO: 296 K/UL — SIGNIFICANT CHANGE UP (ref 150–400)
PLATELET # BLD AUTO: 307 K/UL — SIGNIFICANT CHANGE UP (ref 150–400)
PLATELET CLUMP BLD QL SMEAR: ABNORMAL
PLATELET COUNT - ESTIMATE: NORMAL — SIGNIFICANT CHANGE UP
PLATELET COUNT - ESTIMATE: NORMAL — SIGNIFICANT CHANGE UP
PO2 BLDA: 116 MMHG — HIGH (ref 83–108)
PO2 BLDA: 125 MMHG — HIGH (ref 83–108)
PO2 BLDA: 157 MMHG — HIGH (ref 83–108)
PO2 BLDA: 73 MMHG — LOW (ref 83–108)
PO2 BLDA: 76 MMHG — LOW (ref 83–108)
PO2 BLDV: 105 MMHG — SIGNIFICANT CHANGE UP
POTASSIUM BLDV-SCNC: 3.5 MMOL/L — SIGNIFICANT CHANGE UP (ref 3.5–5.1)
POTASSIUM SERPL-MCNC: 3.4 MMOL/L — LOW (ref 3.5–5.3)
POTASSIUM SERPL-MCNC: 3.4 MMOL/L — LOW (ref 3.5–5.3)
POTASSIUM SERPL-MCNC: 3.5 MMOL/L — SIGNIFICANT CHANGE UP (ref 3.5–5.3)
POTASSIUM SERPL-MCNC: 3.6 MMOL/L — SIGNIFICANT CHANGE UP (ref 3.5–5.3)
POTASSIUM SERPL-MCNC: 3.7 MMOL/L — SIGNIFICANT CHANGE UP (ref 3.5–5.3)
POTASSIUM SERPL-MCNC: 3.8 MMOL/L — SIGNIFICANT CHANGE UP (ref 3.5–5.3)
POTASSIUM SERPL-MCNC: 3.9 MMOL/L — SIGNIFICANT CHANGE UP (ref 3.5–5.3)
POTASSIUM SERPL-MCNC: 3.9 MMOL/L — SIGNIFICANT CHANGE UP (ref 3.5–5.3)
POTASSIUM SERPL-MCNC: 4.1 MMOL/L — SIGNIFICANT CHANGE UP (ref 3.5–5.3)
POTASSIUM SERPL-MCNC: 4.4 MMOL/L — SIGNIFICANT CHANGE UP (ref 3.5–5.3)
POTASSIUM SERPL-MCNC: 4.7 MMOL/L — SIGNIFICANT CHANGE UP (ref 3.5–5.3)
POTASSIUM SERPL-MCNC: 5 MMOL/L — SIGNIFICANT CHANGE UP (ref 3.5–5.3)
POTASSIUM SERPL-MCNC: 5.3 MMOL/L — SIGNIFICANT CHANGE UP (ref 3.5–5.3)
POTASSIUM SERPL-MCNC: 5.9 MMOL/L — HIGH (ref 3.5–5.3)
POTASSIUM SERPL-SCNC: 3.4 MMOL/L — LOW (ref 3.5–5.3)
POTASSIUM SERPL-SCNC: 3.4 MMOL/L — LOW (ref 3.5–5.3)
POTASSIUM SERPL-SCNC: 3.5 MMOL/L — SIGNIFICANT CHANGE UP (ref 3.5–5.3)
POTASSIUM SERPL-SCNC: 3.6 MMOL/L — SIGNIFICANT CHANGE UP (ref 3.5–5.3)
POTASSIUM SERPL-SCNC: 3.7 MMOL/L — SIGNIFICANT CHANGE UP (ref 3.5–5.3)
POTASSIUM SERPL-SCNC: 3.8 MMOL/L — SIGNIFICANT CHANGE UP (ref 3.5–5.3)
POTASSIUM SERPL-SCNC: 3.9 MMOL/L — SIGNIFICANT CHANGE UP (ref 3.5–5.3)
POTASSIUM SERPL-SCNC: 3.9 MMOL/L — SIGNIFICANT CHANGE UP (ref 3.5–5.3)
POTASSIUM SERPL-SCNC: 4.1 MMOL/L — SIGNIFICANT CHANGE UP (ref 3.5–5.3)
POTASSIUM SERPL-SCNC: 4.4 MMOL/L — SIGNIFICANT CHANGE UP (ref 3.5–5.3)
POTASSIUM SERPL-SCNC: 4.7 MMOL/L — SIGNIFICANT CHANGE UP (ref 3.5–5.3)
POTASSIUM SERPL-SCNC: 5 MMOL/L — SIGNIFICANT CHANGE UP (ref 3.5–5.3)
POTASSIUM SERPL-SCNC: 5.3 MMOL/L — SIGNIFICANT CHANGE UP (ref 3.5–5.3)
POTASSIUM SERPL-SCNC: 5.9 MMOL/L — HIGH (ref 3.5–5.3)
PROT SERPL-MCNC: 5.6 G/DL — LOW (ref 6–8.3)
PROT SERPL-MCNC: 5.7 G/DL — LOW (ref 6–8.3)
PROT SERPL-MCNC: 5.9 G/DL — LOW (ref 6–8.3)
PROT SERPL-MCNC: 6 G/DL — SIGNIFICANT CHANGE UP (ref 6–8.3)
PROT SERPL-MCNC: 6 G/DL — SIGNIFICANT CHANGE UP (ref 6–8.3)
PROT SERPL-MCNC: 6.1 G/DL — SIGNIFICANT CHANGE UP (ref 6–8.3)
PROT UR-MCNC: ABNORMAL
PROT UR-MCNC: ABNORMAL
PROTHROM AB SERPL-ACNC: 13.8 SEC — HIGH (ref 10.5–13.4)
PROTHROM AB SERPL-ACNC: 14 SEC — HIGH (ref 10.5–13.4)
RBC # BLD: 3.05 M/UL — LOW (ref 4.2–5.8)
RBC # BLD: 3.07 M/UL — LOW (ref 4.2–5.8)
RBC # BLD: 3.24 M/UL — LOW (ref 4.2–5.8)
RBC # BLD: 3.31 M/UL — LOW (ref 4.2–5.8)
RBC # BLD: 3.37 M/UL — LOW (ref 4.2–5.8)
RBC # BLD: 3.44 M/UL — LOW (ref 4.2–5.8)
RBC # BLD: 3.47 M/UL — LOW (ref 4.2–5.8)
RBC # BLD: 3.49 M/UL — LOW (ref 4.2–5.8)
RBC # BLD: 3.53 M/UL — LOW (ref 4.2–5.8)
RBC # BLD: 3.54 M/UL — LOW (ref 4.2–5.8)
RBC # BLD: 3.55 M/UL — LOW (ref 4.2–5.8)
RBC # BLD: 3.56 M/UL — LOW (ref 4.2–5.8)
RBC # BLD: 3.59 M/UL — LOW (ref 4.2–5.8)
RBC # BLD: 3.6 M/UL — LOW (ref 4.2–5.8)
RBC # BLD: 3.62 M/UL — LOW (ref 4.2–5.8)
RBC # BLD: 3.64 M/UL — LOW (ref 4.2–5.8)
RBC # BLD: 3.71 M/UL — LOW (ref 4.2–5.8)
RBC # BLD: 3.78 M/UL — LOW (ref 4.2–5.8)
RBC # BLD: 3.8 M/UL — LOW (ref 4.2–5.8)
RBC # FLD: 13.3 % — SIGNIFICANT CHANGE UP (ref 10.3–14.5)
RBC # FLD: 13.4 % — SIGNIFICANT CHANGE UP (ref 10.3–14.5)
RBC # FLD: 13.4 % — SIGNIFICANT CHANGE UP (ref 10.3–14.5)
RBC # FLD: 13.5 % — SIGNIFICANT CHANGE UP (ref 10.3–14.5)
RBC # FLD: 13.5 % — SIGNIFICANT CHANGE UP (ref 10.3–14.5)
RBC # FLD: 13.6 % — SIGNIFICANT CHANGE UP (ref 10.3–14.5)
RBC # FLD: 14.1 % — SIGNIFICANT CHANGE UP (ref 10.3–14.5)
RBC # FLD: 14.3 % — SIGNIFICANT CHANGE UP (ref 10.3–14.5)
RBC # FLD: 14.6 % — HIGH (ref 10.3–14.5)
RBC # FLD: 14.6 % — HIGH (ref 10.3–14.5)
RBC # FLD: 14.8 % — HIGH (ref 10.3–14.5)
RBC # FLD: 15.1 % — HIGH (ref 10.3–14.5)
RBC # FLD: 15.2 % — HIGH (ref 10.3–14.5)
RBC # FLD: 15.5 % — HIGH (ref 10.3–14.5)
RBC # FLD: 15.7 % — HIGH (ref 10.3–14.5)
RBC # FLD: 15.7 % — HIGH (ref 10.3–14.5)
RBC # FLD: 15.9 % — HIGH (ref 10.3–14.5)
RBC BLD AUTO: NORMAL — SIGNIFICANT CHANGE UP
RBC BLD AUTO: NORMAL — SIGNIFICANT CHANGE UP
RBC CASTS # UR COMP ASSIST: 3 /HPF — SIGNIFICANT CHANGE UP (ref 0–4)
RH IG SCN BLD-IMP: POSITIVE — SIGNIFICANT CHANGE UP
RH IG SCN BLD-IMP: POSITIVE — SIGNIFICANT CHANGE UP
RSV RNA NPH QL NAA+NON-PROBE: SIGNIFICANT CHANGE UP
S AUREUS DNA NOSE QL NAA+PROBE: DETECTED
SAO2 % BLDA: 91 % — LOW (ref 94–98)
SAO2 % BLDA: 95.7 % — SIGNIFICANT CHANGE UP (ref 94–98)
SAO2 % BLDA: 99 % — HIGH (ref 94–98)
SAO2 % BLDA: 99.1 % — HIGH (ref 94–98)
SAO2 % BLDA: 99.5 % — HIGH (ref 94–98)
SAO2 % BLDV: 98.8 % — SIGNIFICANT CHANGE UP
SARS-COV-2 RNA SPEC QL NAA+PROBE: SIGNIFICANT CHANGE UP
SODIUM SERPL-SCNC: 135 MMOL/L — SIGNIFICANT CHANGE UP (ref 135–145)
SODIUM SERPL-SCNC: 136 MMOL/L — SIGNIFICANT CHANGE UP (ref 135–145)
SODIUM SERPL-SCNC: 137 MMOL/L — SIGNIFICANT CHANGE UP (ref 135–145)
SODIUM SERPL-SCNC: 139 MMOL/L — SIGNIFICANT CHANGE UP (ref 135–145)
SODIUM SERPL-SCNC: 140 MMOL/L — SIGNIFICANT CHANGE UP (ref 135–145)
SODIUM SERPL-SCNC: 141 MMOL/L — SIGNIFICANT CHANGE UP (ref 135–145)
SODIUM SERPL-SCNC: 142 MMOL/L — SIGNIFICANT CHANGE UP (ref 135–145)
SODIUM SERPL-SCNC: 142 MMOL/L — SIGNIFICANT CHANGE UP (ref 135–145)
SODIUM SERPL-SCNC: 143 MMOL/L — SIGNIFICANT CHANGE UP (ref 135–145)
SODIUM SERPL-SCNC: 146 MMOL/L — HIGH (ref 135–145)
SP GR SPEC: 1.02 — SIGNIFICANT CHANGE UP (ref 1–1.05)
SP GR SPEC: 1.04 — SIGNIFICANT CHANGE UP (ref 1–1.05)
SPECIMEN SOURCE: SIGNIFICANT CHANGE UP
TROPONIN T, HIGH SENSITIVITY RESULT: 20 NG/L — SIGNIFICANT CHANGE UP
TSH SERPL-MCNC: 1.67 UIU/ML — SIGNIFICANT CHANGE UP (ref 0.27–4.2)
UROBILINOGEN FLD QL: ABNORMAL
UROBILINOGEN FLD QL: SIGNIFICANT CHANGE UP
VANCOMYCIN TROUGH SERPL-MCNC: 11.6 UG/ML — SIGNIFICANT CHANGE UP (ref 10–20)
WBC # BLD: 10.23 K/UL — SIGNIFICANT CHANGE UP (ref 3.8–10.5)
WBC # BLD: 10.24 K/UL — SIGNIFICANT CHANGE UP (ref 3.8–10.5)
WBC # BLD: 11.16 K/UL — HIGH (ref 3.8–10.5)
WBC # BLD: 11.96 K/UL — HIGH (ref 3.8–10.5)
WBC # BLD: 12.52 K/UL — HIGH (ref 3.8–10.5)
WBC # BLD: 12.7 K/UL — HIGH (ref 3.8–10.5)
WBC # BLD: 12.84 K/UL — HIGH (ref 3.8–10.5)
WBC # BLD: 13.09 K/UL — HIGH (ref 3.8–10.5)
WBC # BLD: 13.62 K/UL — HIGH (ref 3.8–10.5)
WBC # BLD: 14.97 K/UL — HIGH (ref 3.8–10.5)
WBC # BLD: 18.86 K/UL — HIGH (ref 3.8–10.5)
WBC # BLD: 19.03 K/UL — HIGH (ref 3.8–10.5)
WBC # BLD: 19.7 K/UL — HIGH (ref 3.8–10.5)
WBC # BLD: 6.48 K/UL — SIGNIFICANT CHANGE UP (ref 3.8–10.5)
WBC # BLD: 6.97 K/UL — SIGNIFICANT CHANGE UP (ref 3.8–10.5)
WBC # BLD: 9.13 K/UL — SIGNIFICANT CHANGE UP (ref 3.8–10.5)
WBC # BLD: 9.36 K/UL — SIGNIFICANT CHANGE UP (ref 3.8–10.5)
WBC # BLD: 9.44 K/UL — SIGNIFICANT CHANGE UP (ref 3.8–10.5)
WBC # BLD: 9.46 K/UL — SIGNIFICANT CHANGE UP (ref 3.8–10.5)
WBC # FLD AUTO: 10.23 K/UL — SIGNIFICANT CHANGE UP (ref 3.8–10.5)
WBC # FLD AUTO: 10.24 K/UL — SIGNIFICANT CHANGE UP (ref 3.8–10.5)
WBC # FLD AUTO: 11.16 K/UL — HIGH (ref 3.8–10.5)
WBC # FLD AUTO: 11.96 K/UL — HIGH (ref 3.8–10.5)
WBC # FLD AUTO: 12.52 K/UL — HIGH (ref 3.8–10.5)
WBC # FLD AUTO: 12.7 K/UL — HIGH (ref 3.8–10.5)
WBC # FLD AUTO: 12.84 K/UL — HIGH (ref 3.8–10.5)
WBC # FLD AUTO: 13.09 K/UL — HIGH (ref 3.8–10.5)
WBC # FLD AUTO: 13.62 K/UL — HIGH (ref 3.8–10.5)
WBC # FLD AUTO: 14.97 K/UL — HIGH (ref 3.8–10.5)
WBC # FLD AUTO: 18.86 K/UL — HIGH (ref 3.8–10.5)
WBC # FLD AUTO: 19.03 K/UL — HIGH (ref 3.8–10.5)
WBC # FLD AUTO: 19.7 K/UL — HIGH (ref 3.8–10.5)
WBC # FLD AUTO: 6.48 K/UL — SIGNIFICANT CHANGE UP (ref 3.8–10.5)
WBC # FLD AUTO: 6.97 K/UL — SIGNIFICANT CHANGE UP (ref 3.8–10.5)
WBC # FLD AUTO: 9.13 K/UL — SIGNIFICANT CHANGE UP (ref 3.8–10.5)
WBC # FLD AUTO: 9.36 K/UL — SIGNIFICANT CHANGE UP (ref 3.8–10.5)
WBC # FLD AUTO: 9.44 K/UL — SIGNIFICANT CHANGE UP (ref 3.8–10.5)
WBC # FLD AUTO: 9.46 K/UL — SIGNIFICANT CHANGE UP (ref 3.8–10.5)
WBC UR QL: 13 /HPF — HIGH (ref 0–5)

## 2022-01-01 PROCEDURE — 93010 ELECTROCARDIOGRAM REPORT: CPT

## 2022-01-01 PROCEDURE — 99233 SBSQ HOSP IP/OBS HIGH 50: CPT

## 2022-01-01 PROCEDURE — 99232 SBSQ HOSP IP/OBS MODERATE 35: CPT

## 2022-01-01 PROCEDURE — 99223 1ST HOSP IP/OBS HIGH 75: CPT | Mod: GC

## 2022-01-01 PROCEDURE — 99233 SBSQ HOSP IP/OBS HIGH 50: CPT | Mod: FS

## 2022-01-01 PROCEDURE — 99285 EMERGENCY DEPT VISIT HI MDM: CPT | Mod: GC

## 2022-01-01 PROCEDURE — 74018 RADEX ABDOMEN 1 VIEW: CPT | Mod: 26

## 2022-01-01 PROCEDURE — 71045 X-RAY EXAM CHEST 1 VIEW: CPT | Mod: 26

## 2022-01-01 PROCEDURE — 99222 1ST HOSP IP/OBS MODERATE 55: CPT | Mod: GC

## 2022-01-01 PROCEDURE — 99497 ADVNCD CARE PLAN 30 MIN: CPT | Mod: 25

## 2022-01-01 PROCEDURE — 74177 CT ABD & PELVIS W/CONTRAST: CPT | Mod: 26,MA

## 2022-01-01 PROCEDURE — 99498 ADVNCD CARE PLAN ADDL 30 MIN: CPT | Mod: 25

## 2022-01-01 PROCEDURE — 99239 HOSP IP/OBS DSCHRG MGMT >30: CPT

## 2022-01-01 PROCEDURE — 99285 EMERGENCY DEPT VISIT HI MDM: CPT

## 2022-01-01 PROCEDURE — 76604 US EXAM CHEST: CPT | Mod: 26,GC

## 2022-01-01 PROCEDURE — 99232 SBSQ HOSP IP/OBS MODERATE 35: CPT | Mod: FS

## 2022-01-01 PROCEDURE — 99223 1ST HOSP IP/OBS HIGH 75: CPT

## 2022-01-01 PROCEDURE — 90792 PSYCH DIAG EVAL W/MED SRVCS: CPT

## 2022-01-01 PROCEDURE — 71250 CT THORAX DX C-: CPT | Mod: 26

## 2022-01-01 PROCEDURE — 70450 CT HEAD/BRAIN W/O DYE: CPT | Mod: 26

## 2022-01-01 RX ORDER — SODIUM CHLORIDE 9 MG/ML
1000 INJECTION INTRAMUSCULAR; INTRAVENOUS; SUBCUTANEOUS
Refills: 0 | Status: DISCONTINUED | OUTPATIENT
Start: 2022-01-01 | End: 2022-01-01

## 2022-01-01 RX ORDER — PANTOPRAZOLE SODIUM 20 MG/1
40 TABLET, DELAYED RELEASE ORAL
Refills: 0 | Status: DISCONTINUED | OUTPATIENT
Start: 2022-01-01 | End: 2022-01-01

## 2022-01-01 RX ORDER — FLUCONAZOLE 150 MG/1
1 TABLET ORAL
Qty: 0 | Refills: 0 | DISCHARGE

## 2022-01-01 RX ORDER — ALPRAZOLAM 0.25 MG
0.25 TABLET ORAL EVERY 8 HOURS
Refills: 0 | Status: DISCONTINUED | OUTPATIENT
Start: 2022-01-01 | End: 2022-01-01

## 2022-01-01 RX ORDER — METOPROLOL TARTRATE 50 MG
5 TABLET ORAL ONCE
Refills: 0 | Status: COMPLETED | OUTPATIENT
Start: 2022-01-01 | End: 2022-01-01

## 2022-01-01 RX ORDER — FAMOTIDINE 10 MG/ML
20 INJECTION INTRAVENOUS ONCE
Refills: 0 | Status: COMPLETED | OUTPATIENT
Start: 2022-01-01 | End: 2022-01-01

## 2022-01-01 RX ORDER — LACTULOSE 10 G/15ML
30 SOLUTION ORAL
Qty: 0 | Refills: 0 | DISCHARGE

## 2022-01-01 RX ORDER — POLYETHYLENE GLYCOL 3350 17 G/17G
17 POWDER, FOR SOLUTION ORAL
Refills: 0 | Status: DISCONTINUED | OUTPATIENT
Start: 2022-01-01 | End: 2022-01-01

## 2022-01-01 RX ORDER — DEXTROSE 50 % IN WATER 50 %
25 SYRINGE (ML) INTRAVENOUS ONCE
Refills: 0 | Status: DISCONTINUED | OUTPATIENT
Start: 2022-01-01 | End: 2022-01-01

## 2022-01-01 RX ORDER — SODIUM CHLORIDE 9 MG/ML
1000 INJECTION, SOLUTION INTRAVENOUS
Refills: 0 | Status: DISCONTINUED | OUTPATIENT
Start: 2022-01-01 | End: 2022-01-01

## 2022-01-01 RX ORDER — INSULIN LISPRO 100/ML
VIAL (ML) SUBCUTANEOUS EVERY 6 HOURS
Refills: 0 | Status: DISCONTINUED | OUTPATIENT
Start: 2022-01-01 | End: 2022-01-01

## 2022-01-01 RX ORDER — DEXTROSE 50 % IN WATER 50 %
25 SYRINGE (ML) INTRAVENOUS ONCE
Refills: 0 | Status: COMPLETED | OUTPATIENT
Start: 2022-01-01 | End: 2022-01-01

## 2022-01-01 RX ORDER — ACETYLCYSTEINE 200 MG/ML
2 VIAL (ML) MISCELLANEOUS
Qty: 0 | Refills: 0 | DISCHARGE

## 2022-01-01 RX ORDER — INSULIN LISPRO 100/ML
VIAL (ML) SUBCUTANEOUS AT BEDTIME
Refills: 0 | Status: DISCONTINUED | OUTPATIENT
Start: 2022-01-01 | End: 2022-01-01

## 2022-01-01 RX ORDER — SERTRALINE 25 MG/1
25 TABLET, FILM COATED ORAL DAILY
Refills: 0 | Status: DISCONTINUED | OUTPATIENT
Start: 2022-01-01 | End: 2022-01-01

## 2022-01-01 RX ORDER — CHOLECALCIFEROL (VITAMIN D3) 125 MCG
1000 CAPSULE ORAL
Qty: 0 | Refills: 0 | DISCHARGE
Start: 2022-01-01

## 2022-01-01 RX ORDER — APIXABAN 2.5 MG/1
2.5 TABLET, FILM COATED ORAL EVERY 12 HOURS
Refills: 0 | Status: DISCONTINUED | OUTPATIENT
Start: 2022-01-01 | End: 2022-01-01

## 2022-01-01 RX ORDER — DILTIAZEM HCL 120 MG
240 CAPSULE, EXT RELEASE 24 HR ORAL DAILY
Refills: 0 | Status: DISCONTINUED | OUTPATIENT
Start: 2022-01-01 | End: 2022-01-01

## 2022-01-01 RX ORDER — BUDESONIDE, MICRONIZED 100 %
2 POWDER (GRAM) MISCELLANEOUS
Qty: 0 | Refills: 0 | DISCHARGE

## 2022-01-01 RX ORDER — POTASSIUM CHLORIDE 20 MEQ
10 PACKET (EA) ORAL
Refills: 0 | Status: DISCONTINUED | OUTPATIENT
Start: 2022-01-01 | End: 2022-01-01

## 2022-01-01 RX ORDER — ACETYLCYSTEINE 200 MG/ML
2 VIAL (ML) MISCELLANEOUS EVERY 6 HOURS
Refills: 0 | Status: DISCONTINUED | OUTPATIENT
Start: 2022-01-01 | End: 2022-01-01

## 2022-01-01 RX ORDER — SODIUM,POTASSIUM PHOSPHATES 278-250MG
1 POWDER IN PACKET (EA) ORAL ONCE
Refills: 0 | Status: COMPLETED | OUTPATIENT
Start: 2022-01-01 | End: 2022-01-01

## 2022-01-01 RX ORDER — FLUCONAZOLE 150 MG/1
100 TABLET ORAL DAILY
Refills: 0 | Status: DISCONTINUED | OUTPATIENT
Start: 2022-01-01 | End: 2022-01-01

## 2022-01-01 RX ORDER — INSULIN LISPRO 100/ML
VIAL (ML) SUBCUTANEOUS
Refills: 0 | Status: DISCONTINUED | OUTPATIENT
Start: 2022-01-01 | End: 2022-01-01

## 2022-01-01 RX ORDER — POTASSIUM CHLORIDE 20 MEQ
40 PACKET (EA) ORAL EVERY 4 HOURS
Refills: 0 | Status: COMPLETED | OUTPATIENT
Start: 2022-01-01 | End: 2022-01-01

## 2022-01-01 RX ORDER — CEFTRIAXONE 500 MG/1
1000 INJECTION, POWDER, FOR SOLUTION INTRAMUSCULAR; INTRAVENOUS ONCE
Refills: 0 | Status: DISCONTINUED | OUTPATIENT
Start: 2022-01-01 | End: 2022-01-01

## 2022-01-01 RX ORDER — ACETAMINOPHEN 500 MG
650 TABLET ORAL ONCE
Refills: 0 | Status: COMPLETED | OUTPATIENT
Start: 2022-01-01 | End: 2022-01-01

## 2022-01-01 RX ORDER — VANCOMYCIN HCL 1 G
1000 VIAL (EA) INTRAVENOUS ONCE
Refills: 0 | Status: COMPLETED | OUTPATIENT
Start: 2022-01-01 | End: 2022-01-01

## 2022-01-01 RX ORDER — HALOPERIDOL DECANOATE 100 MG/ML
0.5 INJECTION INTRAMUSCULAR ONCE
Refills: 0 | Status: DISCONTINUED | OUTPATIENT
Start: 2022-01-01 | End: 2022-01-01

## 2022-01-01 RX ORDER — SENNA PLUS 8.6 MG/1
2 TABLET ORAL AT BEDTIME
Refills: 0 | Status: DISCONTINUED | OUTPATIENT
Start: 2022-01-01 | End: 2022-01-01

## 2022-01-01 RX ORDER — MORPHINE SULFATE 50 MG/1
0.5 CAPSULE, EXTENDED RELEASE ORAL EVERY 4 HOURS
Refills: 0 | Status: DISCONTINUED | OUTPATIENT
Start: 2022-01-01 | End: 2022-01-01

## 2022-01-01 RX ORDER — UMECLIDINIUM 62.5 UG/1
1 AEROSOL, POWDER ORAL
Qty: 0 | Refills: 0 | DISCHARGE

## 2022-01-01 RX ORDER — IPRATROPIUM BROMIDE 0.2 MG/ML
2.5 SOLUTION, NON-ORAL INHALATION
Qty: 0 | Refills: 0 | DISCHARGE

## 2022-01-01 RX ORDER — METOPROLOL TARTRATE 50 MG
2.5 TABLET ORAL EVERY 8 HOURS
Refills: 0 | Status: DISCONTINUED | OUTPATIENT
Start: 2022-01-01 | End: 2022-01-01

## 2022-01-01 RX ORDER — PIPERACILLIN AND TAZOBACTAM 4; .5 G/20ML; G/20ML
3.38 INJECTION, POWDER, LYOPHILIZED, FOR SOLUTION INTRAVENOUS ONCE
Refills: 0 | Status: COMPLETED | OUTPATIENT
Start: 2022-01-01 | End: 2022-01-01

## 2022-01-01 RX ORDER — LEVALBUTEROL 1.25 MG/.5ML
0.63 SOLUTION, CONCENTRATE RESPIRATORY (INHALATION) EVERY 6 HOURS
Refills: 0 | Status: DISCONTINUED | OUTPATIENT
Start: 2022-01-01 | End: 2022-01-01

## 2022-01-01 RX ORDER — ASPIRIN/CALCIUM CARB/MAGNESIUM 324 MG
1 TABLET ORAL
Qty: 0 | Refills: 0 | DISCHARGE

## 2022-01-01 RX ORDER — POTASSIUM CHLORIDE 20 MEQ
40 PACKET (EA) ORAL ONCE
Refills: 0 | Status: COMPLETED | OUTPATIENT
Start: 2022-01-01 | End: 2022-01-01

## 2022-01-01 RX ORDER — IPRATROPIUM/ALBUTEROL SULFATE 18-103MCG
3 AEROSOL WITH ADAPTER (GRAM) INHALATION EVERY 6 HOURS
Refills: 0 | Status: DISCONTINUED | OUTPATIENT
Start: 2022-01-01 | End: 2022-01-01

## 2022-01-01 RX ORDER — SODIUM CHLORIDE 9 MG/ML
1000 INJECTION, SOLUTION INTRAVENOUS
Refills: 0 | Status: COMPLETED | OUTPATIENT
Start: 2022-01-01 | End: 2022-01-01

## 2022-01-01 RX ORDER — HEPARIN SODIUM 5000 [USP'U]/ML
5000 INJECTION INTRAVENOUS; SUBCUTANEOUS EVERY 12 HOURS
Refills: 0 | Status: DISCONTINUED | OUTPATIENT
Start: 2022-01-01 | End: 2022-01-01

## 2022-01-01 RX ORDER — POTASSIUM CHLORIDE 20 MEQ
20 PACKET (EA) ORAL ONCE
Refills: 0 | Status: COMPLETED | OUTPATIENT
Start: 2022-01-01 | End: 2022-01-01

## 2022-01-01 RX ORDER — VANCOMYCIN HCL 1 G
750 VIAL (EA) INTRAVENOUS EVERY 12 HOURS
Refills: 0 | Status: DISCONTINUED | OUTPATIENT
Start: 2022-01-01 | End: 2022-01-01

## 2022-01-01 RX ORDER — APIXABAN 2.5 MG/1
1 TABLET, FILM COATED ORAL
Qty: 0 | Refills: 0 | DISCHARGE

## 2022-01-01 RX ORDER — CEFTRIAXONE 500 MG/1
1000 INJECTION, POWDER, FOR SOLUTION INTRAMUSCULAR; INTRAVENOUS EVERY 24 HOURS
Refills: 0 | Status: COMPLETED | OUTPATIENT
Start: 2022-01-01 | End: 2022-01-01

## 2022-01-01 RX ORDER — REPAGLINIDE 1 MG/1
1 TABLET ORAL
Qty: 90 | Refills: 0
Start: 2022-01-01 | End: 2022-07-28

## 2022-01-01 RX ORDER — FLUTICASONE PROPIONATE 50 MCG
1 SPRAY, SUSPENSION NASAL
Qty: 0 | Refills: 0 | DISCHARGE

## 2022-01-01 RX ORDER — SENNA PLUS 8.6 MG/1
1 TABLET ORAL
Qty: 0 | Refills: 0 | DISCHARGE

## 2022-01-01 RX ORDER — POTASSIUM CHLORIDE 20 MEQ
10 PACKET (EA) ORAL
Refills: 0 | Status: COMPLETED | OUTPATIENT
Start: 2022-01-01 | End: 2022-01-01

## 2022-01-01 RX ORDER — ASPIRIN/CALCIUM CARB/MAGNESIUM 324 MG
81 TABLET ORAL DAILY
Refills: 0 | Status: DISCONTINUED | OUTPATIENT
Start: 2022-01-01 | End: 2022-01-01

## 2022-01-01 RX ORDER — POTASSIUM PHOSPHATE, MONOBASIC POTASSIUM PHOSPHATE, DIBASIC 236; 224 MG/ML; MG/ML
15 INJECTION, SOLUTION INTRAVENOUS ONCE
Refills: 0 | Status: COMPLETED | OUTPATIENT
Start: 2022-01-01 | End: 2022-01-01

## 2022-01-01 RX ORDER — ALPRAZOLAM 0.25 MG
1 TABLET ORAL
Qty: 0 | Refills: 0 | DISCHARGE

## 2022-01-01 RX ORDER — LANOLIN ALCOHOL/MO/W.PET/CERES
3 CREAM (GRAM) TOPICAL ONCE
Refills: 0 | Status: COMPLETED | OUTPATIENT
Start: 2022-01-01 | End: 2022-01-01

## 2022-01-01 RX ORDER — INSULIN LISPRO 100/ML
2 VIAL (ML) SUBCUTANEOUS
Refills: 0 | Status: DISCONTINUED | OUTPATIENT
Start: 2022-01-01 | End: 2022-01-01

## 2022-01-01 RX ORDER — PANTOPRAZOLE SODIUM 20 MG/1
40 TABLET, DELAYED RELEASE ORAL DAILY
Refills: 0 | Status: DISCONTINUED | OUTPATIENT
Start: 2022-01-01 | End: 2022-01-01

## 2022-01-01 RX ORDER — SODIUM,POTASSIUM PHOSPHATES 278-250MG
1 POWDER IN PACKET (EA) ORAL
Refills: 0 | Status: COMPLETED | OUTPATIENT
Start: 2022-01-01 | End: 2022-01-01

## 2022-01-01 RX ORDER — DEXTROSE 50 % IN WATER 50 %
15 SYRINGE (ML) INTRAVENOUS ONCE
Refills: 0 | Status: DISCONTINUED | OUTPATIENT
Start: 2022-01-01 | End: 2022-01-01

## 2022-01-01 RX ORDER — PANTOPRAZOLE SODIUM 20 MG/1
1 TABLET, DELAYED RELEASE ORAL
Qty: 30 | Refills: 0
Start: 2022-01-01 | End: 2022-07-28

## 2022-01-01 RX ORDER — CEFTRIAXONE 500 MG/1
2000 INJECTION, POWDER, FOR SOLUTION INTRAMUSCULAR; INTRAVENOUS ONCE
Refills: 0 | Status: COMPLETED | OUTPATIENT
Start: 2022-01-01 | End: 2022-01-01

## 2022-01-01 RX ORDER — ACETYLCYSTEINE 200 MG/ML
4 VIAL (ML) MISCELLANEOUS EVERY 12 HOURS
Refills: 0 | Status: DISCONTINUED | OUTPATIENT
Start: 2022-01-01 | End: 2022-01-01

## 2022-01-01 RX ORDER — LACTULOSE 10 G/15ML
10 SOLUTION ORAL ONCE
Refills: 0 | Status: COMPLETED | OUTPATIENT
Start: 2022-01-01 | End: 2022-01-01

## 2022-01-01 RX ORDER — HYDROCORTISONE 1 %
1 OINTMENT (GRAM) TOPICAL
Refills: 0 | Status: COMPLETED | OUTPATIENT
Start: 2022-01-01 | End: 2022-01-01

## 2022-01-01 RX ORDER — DEXTROSE 50 % IN WATER 50 %
50 SYRINGE (ML) INTRAVENOUS ONCE
Refills: 0 | Status: COMPLETED | OUTPATIENT
Start: 2022-01-01 | End: 2022-01-01

## 2022-01-01 RX ORDER — DILTIAZEM HCL 120 MG
1 CAPSULE, EXT RELEASE 24 HR ORAL
Qty: 0 | Refills: 0 | DISCHARGE

## 2022-01-01 RX ORDER — LACTULOSE 10 G/15ML
10 SOLUTION ORAL THREE TIMES A DAY
Refills: 0 | Status: DISCONTINUED | OUTPATIENT
Start: 2022-01-01 | End: 2022-01-01

## 2022-01-01 RX ORDER — INSULIN HUMAN 100 [IU]/ML
10 INJECTION, SOLUTION SUBCUTANEOUS ONCE
Refills: 0 | Status: COMPLETED | OUTPATIENT
Start: 2022-01-01 | End: 2022-01-01

## 2022-01-01 RX ORDER — ACETAMINOPHEN 500 MG
650 TABLET ORAL EVERY 6 HOURS
Refills: 0 | Status: DISCONTINUED | OUTPATIENT
Start: 2022-01-01 | End: 2022-01-01

## 2022-01-01 RX ORDER — SODIUM CHLORIDE 9 MG/ML
1000 INJECTION INTRAMUSCULAR; INTRAVENOUS; SUBCUTANEOUS ONCE
Refills: 0 | Status: COMPLETED | OUTPATIENT
Start: 2022-01-01 | End: 2022-01-01

## 2022-01-01 RX ORDER — ACETAMINOPHEN 500 MG
1000 TABLET ORAL ONCE
Refills: 0 | Status: COMPLETED | OUTPATIENT
Start: 2022-01-01 | End: 2022-01-01

## 2022-01-01 RX ORDER — POLYETHYLENE GLYCOL 3350 17 G/17G
17 POWDER, FOR SOLUTION ORAL
Qty: 0 | Refills: 0 | DISCHARGE

## 2022-01-01 RX ORDER — IPRATROPIUM/ALBUTEROL SULFATE 18-103MCG
3 AEROSOL WITH ADAPTER (GRAM) INHALATION
Qty: 0 | Refills: 0 | DISCHARGE

## 2022-01-01 RX ORDER — MUPIROCIN 20 MG/G
1 OINTMENT TOPICAL
Refills: 0 | Status: DISCONTINUED | OUTPATIENT
Start: 2022-01-01 | End: 2022-01-01

## 2022-01-01 RX ORDER — FAMOTIDINE 10 MG/ML
20 INJECTION INTRAVENOUS DAILY
Refills: 0 | Status: DISCONTINUED | OUTPATIENT
Start: 2022-01-01 | End: 2022-01-01

## 2022-01-01 RX ORDER — ACETYLCYSTEINE 200 MG/ML
1 VIAL (ML) MISCELLANEOUS EVERY 6 HOURS
Refills: 0 | Status: DISCONTINUED | OUTPATIENT
Start: 2022-01-01 | End: 2022-01-01

## 2022-01-01 RX ORDER — SERTRALINE 25 MG/1
1 TABLET, FILM COATED ORAL
Qty: 0 | Refills: 0 | DISCHARGE

## 2022-01-01 RX ORDER — ACETAMINOPHEN 500 MG
2 TABLET ORAL
Qty: 0 | Refills: 0 | DISCHARGE

## 2022-01-01 RX ORDER — DEXTROSE 50 % IN WATER 50 %
12.5 SYRINGE (ML) INTRAVENOUS ONCE
Refills: 0 | Status: DISCONTINUED | OUTPATIENT
Start: 2022-01-01 | End: 2022-01-01

## 2022-01-01 RX ORDER — POLYETHYLENE GLYCOL 3350 17 G/17G
17 POWDER, FOR SOLUTION ORAL DAILY
Refills: 0 | Status: DISCONTINUED | OUTPATIENT
Start: 2022-01-01 | End: 2022-01-01

## 2022-01-01 RX ORDER — SODIUM CHLORIDE 9 MG/ML
500 INJECTION INTRAMUSCULAR; INTRAVENOUS; SUBCUTANEOUS ONCE
Refills: 0 | Status: COMPLETED | OUTPATIENT
Start: 2022-01-01 | End: 2022-01-01

## 2022-01-01 RX ORDER — FLUTICASONE PROPIONATE 50 MCG
1 SPRAY, SUSPENSION NASAL DAILY
Refills: 0 | Status: DISCONTINUED | OUTPATIENT
Start: 2022-01-01 | End: 2022-01-01

## 2022-01-01 RX ORDER — PIPERACILLIN AND TAZOBACTAM 4; .5 G/20ML; G/20ML
3.38 INJECTION, POWDER, LYOPHILIZED, FOR SOLUTION INTRAVENOUS EVERY 8 HOURS
Refills: 0 | Status: DISCONTINUED | OUTPATIENT
Start: 2022-01-01 | End: 2022-01-01

## 2022-01-01 RX ORDER — CHOLECALCIFEROL (VITAMIN D3) 125 MCG
1000 CAPSULE ORAL DAILY
Refills: 0 | Status: DISCONTINUED | OUTPATIENT
Start: 2022-01-01 | End: 2022-01-01

## 2022-01-01 RX ORDER — GLUCAGON INJECTION, SOLUTION 0.5 MG/.1ML
1 INJECTION, SOLUTION SUBCUTANEOUS ONCE
Refills: 0 | Status: DISCONTINUED | OUTPATIENT
Start: 2022-01-01 | End: 2022-01-01

## 2022-01-01 RX ORDER — DOCUSATE SODIUM 100 MG
1 CAPSULE ORAL
Qty: 0 | Refills: 0 | DISCHARGE

## 2022-01-01 RX ORDER — INSULIN LISPRO 100/ML
0 VIAL (ML) SUBCUTANEOUS
Qty: 0 | Refills: 0 | DISCHARGE

## 2022-01-01 RX ORDER — INSULIN LISPRO 100/ML
3 VIAL (ML) SUBCUTANEOUS
Refills: 0 | Status: DISCONTINUED | OUTPATIENT
Start: 2022-01-01 | End: 2022-01-01

## 2022-01-01 RX ORDER — CHLORHEXIDINE GLUCONATE 213 G/1000ML
1 SOLUTION TOPICAL DAILY
Refills: 0 | Status: DISCONTINUED | OUTPATIENT
Start: 2022-01-01 | End: 2022-01-01

## 2022-01-01 RX ORDER — BUDESONIDE, MICRONIZED 100 %
0.5 POWDER (GRAM) MISCELLANEOUS DAILY
Refills: 0 | Status: DISCONTINUED | OUTPATIENT
Start: 2022-01-01 | End: 2022-01-01

## 2022-01-01 RX ORDER — MAGNESIUM SULFATE 500 MG/ML
2 VIAL (ML) INJECTION ONCE
Refills: 0 | Status: COMPLETED | OUTPATIENT
Start: 2022-01-01 | End: 2022-01-01

## 2022-01-01 RX ORDER — IPRATROPIUM BROMIDE 0.2 MG/ML
500 SOLUTION, NON-ORAL INHALATION EVERY 6 HOURS
Refills: 0 | Status: DISCONTINUED | OUTPATIENT
Start: 2022-01-01 | End: 2022-01-01

## 2022-01-01 RX ORDER — INSULIN GLARGINE 100 [IU]/ML
16 INJECTION, SOLUTION SUBCUTANEOUS
Qty: 0 | Refills: 0 | DISCHARGE

## 2022-01-01 RX ORDER — INSULIN LISPRO 100/ML
1 VIAL (ML) SUBCUTANEOUS
Refills: 0 | Status: DISCONTINUED | OUTPATIENT
Start: 2022-01-01 | End: 2022-01-01

## 2022-01-01 RX ORDER — SODIUM CHLORIDE 9 MG/ML
4 INJECTION INTRAMUSCULAR; INTRAVENOUS; SUBCUTANEOUS EVERY 6 HOURS
Refills: 0 | Status: DISCONTINUED | OUTPATIENT
Start: 2022-01-01 | End: 2022-01-01

## 2022-01-01 RX ORDER — AMPICILLIN SODIUM AND SULBACTAM SODIUM 250; 125 MG/ML; MG/ML
3 INJECTION, POWDER, FOR SUSPENSION INTRAMUSCULAR; INTRAVENOUS ONCE
Refills: 0 | Status: COMPLETED | OUTPATIENT
Start: 2022-01-01 | End: 2022-01-01

## 2022-01-01 RX ORDER — ACETAMINOPHEN 500 MG
1000 TABLET ORAL ONCE
Refills: 0 | Status: DISCONTINUED | OUTPATIENT
Start: 2022-01-01 | End: 2022-01-01

## 2022-01-01 RX ORDER — SENNA PLUS 8.6 MG/1
1 TABLET ORAL AT BEDTIME
Refills: 0 | Status: DISCONTINUED | OUTPATIENT
Start: 2022-01-01 | End: 2022-01-01

## 2022-01-01 RX ORDER — FAMOTIDINE 10 MG/ML
1 INJECTION INTRAVENOUS
Qty: 0 | Refills: 0 | DISCHARGE

## 2022-01-01 RX ORDER — OLANZAPINE 15 MG/1
1.25 TABLET, FILM COATED ORAL ONCE
Refills: 0 | Status: COMPLETED | OUTPATIENT
Start: 2022-01-01 | End: 2022-01-01

## 2022-01-01 RX ORDER — SIMETHICONE 80 MG/1
80 TABLET, CHEWABLE ORAL
Refills: 0 | Status: COMPLETED | OUTPATIENT
Start: 2022-01-01 | End: 2022-01-01

## 2022-01-01 RX ADMIN — Medication 2.5 MILLIGRAM(S): at 13:32

## 2022-01-01 RX ADMIN — Medication 1 MILLILITER(S): at 16:34

## 2022-01-01 RX ADMIN — SODIUM CHLORIDE 4 MILLILITER(S): 9 INJECTION INTRAMUSCULAR; INTRAVENOUS; SUBCUTANEOUS at 09:44

## 2022-01-01 RX ADMIN — Medication 1 PACKET(S): at 17:49

## 2022-01-01 RX ADMIN — Medication 20 MILLIGRAM(S): at 05:03

## 2022-01-01 RX ADMIN — HEPARIN SODIUM 5000 UNIT(S): 5000 INJECTION INTRAVENOUS; SUBCUTANEOUS at 18:19

## 2022-01-01 RX ADMIN — SIMETHICONE 80 MILLIGRAM(S): 80 TABLET, CHEWABLE ORAL at 21:18

## 2022-01-01 RX ADMIN — SIMETHICONE 80 MILLIGRAM(S): 80 TABLET, CHEWABLE ORAL at 23:35

## 2022-01-01 RX ADMIN — PANTOPRAZOLE SODIUM 40 MILLIGRAM(S): 20 TABLET, DELAYED RELEASE ORAL at 16:27

## 2022-01-01 RX ADMIN — SIMETHICONE 80 MILLIGRAM(S): 80 TABLET, CHEWABLE ORAL at 05:45

## 2022-01-01 RX ADMIN — HEPARIN SODIUM 5000 UNIT(S): 5000 INJECTION INTRAVENOUS; SUBCUTANEOUS at 05:56

## 2022-01-01 RX ADMIN — Medication 10 MILLIGRAM(S): at 17:28

## 2022-01-01 RX ADMIN — LEVALBUTEROL 0.63 MILLIGRAM(S): 1.25 SOLUTION, CONCENTRATE RESPIRATORY (INHALATION) at 04:16

## 2022-01-01 RX ADMIN — PIPERACILLIN AND TAZOBACTAM 25 GRAM(S): 4; .5 INJECTION, POWDER, LYOPHILIZED, FOR SOLUTION INTRAVENOUS at 11:18

## 2022-01-01 RX ADMIN — Medication 30 MILLIGRAM(S): at 05:44

## 2022-01-01 RX ADMIN — Medication 40 MILLIEQUIVALENT(S): at 12:31

## 2022-01-01 RX ADMIN — Medication 2: at 13:09

## 2022-01-01 RX ADMIN — Medication 250 MILLIGRAM(S): at 21:53

## 2022-01-01 RX ADMIN — Medication 5 MILLIGRAM(S): at 23:07

## 2022-01-01 RX ADMIN — Medication 1: at 21:39

## 2022-01-01 RX ADMIN — Medication 260 MILLIGRAM(S): at 18:46

## 2022-01-01 RX ADMIN — Medication 1 UNIT(S): at 17:45

## 2022-01-01 RX ADMIN — Medication 500 MICROGRAM(S): at 09:55

## 2022-01-01 RX ADMIN — Medication 3 MILLIGRAM(S): at 02:18

## 2022-01-01 RX ADMIN — SODIUM CHLORIDE 50 MILLILITER(S): 9 INJECTION, SOLUTION INTRAVENOUS at 09:31

## 2022-01-01 RX ADMIN — Medication 500 MICROGRAM(S): at 03:49

## 2022-01-01 RX ADMIN — Medication 1: at 18:23

## 2022-01-01 RX ADMIN — Medication 1 MILLILITER(S): at 09:57

## 2022-01-01 RX ADMIN — Medication 30 MILLIGRAM(S): at 06:00

## 2022-01-01 RX ADMIN — Medication 500 MICROGRAM(S): at 11:13

## 2022-01-01 RX ADMIN — CEFTRIAXONE 100 MILLIGRAM(S): 500 INJECTION, POWDER, FOR SOLUTION INTRAMUSCULAR; INTRAVENOUS at 07:03

## 2022-01-01 RX ADMIN — SERTRALINE 25 MILLIGRAM(S): 25 TABLET, FILM COATED ORAL at 12:31

## 2022-01-01 RX ADMIN — SODIUM CHLORIDE 65 MILLILITER(S): 9 INJECTION, SOLUTION INTRAVENOUS at 06:53

## 2022-01-01 RX ADMIN — FLUCONAZOLE 100 MILLIGRAM(S): 150 TABLET ORAL at 12:28

## 2022-01-01 RX ADMIN — Medication 81 MILLIGRAM(S): at 12:49

## 2022-01-01 RX ADMIN — SODIUM CHLORIDE 4 MILLILITER(S): 9 INJECTION INTRAMUSCULAR; INTRAVENOUS; SUBCUTANEOUS at 04:37

## 2022-01-01 RX ADMIN — CEFTRIAXONE 100 MILLIGRAM(S): 500 INJECTION, POWDER, FOR SOLUTION INTRAMUSCULAR; INTRAVENOUS at 05:42

## 2022-01-01 RX ADMIN — Medication 500 MICROGRAM(S): at 16:34

## 2022-01-01 RX ADMIN — SIMETHICONE 80 MILLIGRAM(S): 80 TABLET, CHEWABLE ORAL at 05:48

## 2022-01-01 RX ADMIN — PANTOPRAZOLE SODIUM 40 MILLIGRAM(S): 20 TABLET, DELAYED RELEASE ORAL at 12:44

## 2022-01-01 RX ADMIN — LEVALBUTEROL 0.63 MILLIGRAM(S): 1.25 SOLUTION, CONCENTRATE RESPIRATORY (INHALATION) at 09:44

## 2022-01-01 RX ADMIN — Medication 81 MILLIGRAM(S): at 12:08

## 2022-01-01 RX ADMIN — SERTRALINE 25 MILLIGRAM(S): 25 TABLET, FILM COATED ORAL at 12:28

## 2022-01-01 RX ADMIN — POTASSIUM PHOSPHATE, MONOBASIC POTASSIUM PHOSPHATE, DIBASIC 62.5 MILLIMOLE(S): 236; 224 INJECTION, SOLUTION INTRAVENOUS at 10:00

## 2022-01-01 RX ADMIN — POLYETHYLENE GLYCOL 3350 17 GRAM(S): 17 POWDER, FOR SOLUTION ORAL at 07:25

## 2022-01-01 RX ADMIN — PANTOPRAZOLE SODIUM 40 MILLIGRAM(S): 20 TABLET, DELAYED RELEASE ORAL at 12:30

## 2022-01-01 RX ADMIN — Medication 0.5 MILLIGRAM(S): at 10:53

## 2022-01-01 RX ADMIN — Medication 2: at 17:12

## 2022-01-01 RX ADMIN — Medication 1: at 09:05

## 2022-01-01 RX ADMIN — MORPHINE SULFATE 0.5 MILLIGRAM(S): 50 CAPSULE, EXTENDED RELEASE ORAL at 23:36

## 2022-01-01 RX ADMIN — Medication 4 MILLILITER(S): at 21:46

## 2022-01-01 RX ADMIN — Medication 1 APPLICATION(S): at 17:30

## 2022-01-01 RX ADMIN — Medication 1 SUPPOSITORY(S): at 05:47

## 2022-01-01 RX ADMIN — Medication 2.5 MILLIGRAM(S): at 05:36

## 2022-01-01 RX ADMIN — Medication 500 MICROGRAM(S): at 22:57

## 2022-01-01 RX ADMIN — LACTULOSE 10 GRAM(S): 10 SOLUTION ORAL at 13:33

## 2022-01-01 RX ADMIN — POLYETHYLENE GLYCOL 3350 17 GRAM(S): 17 POWDER, FOR SOLUTION ORAL at 07:39

## 2022-01-01 RX ADMIN — SENNA PLUS 2 TABLET(S): 8.6 TABLET ORAL at 21:43

## 2022-01-01 RX ADMIN — Medication 2: at 18:17

## 2022-01-01 RX ADMIN — Medication 500 MICROGRAM(S): at 04:11

## 2022-01-01 RX ADMIN — Medication 2.5 MILLIGRAM(S): at 21:14

## 2022-01-01 RX ADMIN — Medication 5 MILLIGRAM(S): at 21:14

## 2022-01-01 RX ADMIN — HEPARIN SODIUM 5000 UNIT(S): 5000 INJECTION INTRAVENOUS; SUBCUTANEOUS at 17:52

## 2022-01-01 RX ADMIN — Medication 10 MILLIGRAM(S): at 12:48

## 2022-01-01 RX ADMIN — SODIUM CHLORIDE 65 MILLILITER(S): 9 INJECTION, SOLUTION INTRAVENOUS at 20:43

## 2022-01-01 RX ADMIN — FLUCONAZOLE 100 MILLIGRAM(S): 150 TABLET ORAL at 11:32

## 2022-01-01 RX ADMIN — Medication 1: at 00:01

## 2022-01-01 RX ADMIN — Medication 500 MICROGRAM(S): at 04:32

## 2022-01-01 RX ADMIN — Medication 250 MILLIGRAM(S): at 21:37

## 2022-01-01 RX ADMIN — SODIUM CHLORIDE 4 MILLILITER(S): 9 INJECTION INTRAMUSCULAR; INTRAVENOUS; SUBCUTANEOUS at 21:46

## 2022-01-01 RX ADMIN — LEVALBUTEROL 0.63 MILLIGRAM(S): 1.25 SOLUTION, CONCENTRATE RESPIRATORY (INHALATION) at 16:31

## 2022-01-01 RX ADMIN — POLYETHYLENE GLYCOL 3350 17 GRAM(S): 17 POWDER, FOR SOLUTION ORAL at 07:00

## 2022-01-01 RX ADMIN — LACTULOSE 10 GRAM(S): 10 SOLUTION ORAL at 21:00

## 2022-01-01 RX ADMIN — MORPHINE SULFATE 0.5 MILLIGRAM(S): 50 CAPSULE, EXTENDED RELEASE ORAL at 15:47

## 2022-01-01 RX ADMIN — PANTOPRAZOLE SODIUM 40 MILLIGRAM(S): 20 TABLET, DELAYED RELEASE ORAL at 11:57

## 2022-01-01 RX ADMIN — SERTRALINE 25 MILLIGRAM(S): 25 TABLET, FILM COATED ORAL at 11:32

## 2022-01-01 RX ADMIN — PIPERACILLIN AND TAZOBACTAM 25 GRAM(S): 4; .5 INJECTION, POWDER, LYOPHILIZED, FOR SOLUTION INTRAVENOUS at 18:33

## 2022-01-01 RX ADMIN — Medication 240 MILLIGRAM(S): at 05:45

## 2022-01-01 RX ADMIN — LEVALBUTEROL 0.63 MILLIGRAM(S): 1.25 SOLUTION, CONCENTRATE RESPIRATORY (INHALATION) at 04:13

## 2022-01-01 RX ADMIN — Medication 1000 UNIT(S): at 12:45

## 2022-01-01 RX ADMIN — SODIUM CHLORIDE 60 MILLILITER(S): 9 INJECTION INTRAMUSCULAR; INTRAVENOUS; SUBCUTANEOUS at 13:40

## 2022-01-01 RX ADMIN — AMPICILLIN SODIUM AND SULBACTAM SODIUM 200 GRAM(S): 250; 125 INJECTION, POWDER, FOR SUSPENSION INTRAMUSCULAR; INTRAVENOUS at 14:33

## 2022-01-01 RX ADMIN — Medication 2: at 00:07

## 2022-01-01 RX ADMIN — PIPERACILLIN AND TAZOBACTAM 25 GRAM(S): 4; .5 INJECTION, POWDER, LYOPHILIZED, FOR SOLUTION INTRAVENOUS at 12:10

## 2022-01-01 RX ADMIN — SODIUM CHLORIDE 65 MILLILITER(S): 9 INJECTION, SOLUTION INTRAVENOUS at 17:30

## 2022-01-01 RX ADMIN — Medication 1: at 17:44

## 2022-01-01 RX ADMIN — SIMETHICONE 80 MILLIGRAM(S): 80 TABLET, CHEWABLE ORAL at 12:31

## 2022-01-01 RX ADMIN — MORPHINE SULFATE 0.5 MILLIGRAM(S): 50 CAPSULE, EXTENDED RELEASE ORAL at 23:06

## 2022-01-01 RX ADMIN — MORPHINE SULFATE 0.5 MILLIGRAM(S): 50 CAPSULE, EXTENDED RELEASE ORAL at 17:32

## 2022-01-01 RX ADMIN — Medication 1 SUPPOSITORY(S): at 18:40

## 2022-01-01 RX ADMIN — Medication 2.5 MILLIGRAM(S): at 05:07

## 2022-01-01 RX ADMIN — Medication 0.25 MILLIGRAM(S): at 05:44

## 2022-01-01 RX ADMIN — Medication 1 PACKET(S): at 17:43

## 2022-01-01 RX ADMIN — Medication 1 SUPPOSITORY(S): at 06:25

## 2022-01-01 RX ADMIN — Medication 1 MILLILITER(S): at 03:49

## 2022-01-01 RX ADMIN — Medication 5 MILLIGRAM(S): at 11:43

## 2022-01-01 RX ADMIN — Medication 1: at 18:39

## 2022-01-01 RX ADMIN — Medication 500 MICROGRAM(S): at 16:39

## 2022-01-01 RX ADMIN — Medication 650 MILLIGRAM(S): at 19:16

## 2022-01-01 RX ADMIN — Medication 650 MILLIGRAM(S): at 22:07

## 2022-01-01 RX ADMIN — Medication 100 MILLIEQUIVALENT(S): at 19:42

## 2022-01-01 RX ADMIN — PIPERACILLIN AND TAZOBACTAM 25 GRAM(S): 4; .5 INJECTION, POWDER, LYOPHILIZED, FOR SOLUTION INTRAVENOUS at 20:43

## 2022-01-01 RX ADMIN — Medication 4 MILLILITER(S): at 10:54

## 2022-01-01 RX ADMIN — Medication 30 MILLIGRAM(S): at 05:45

## 2022-01-01 RX ADMIN — LEVALBUTEROL 0.63 MILLIGRAM(S): 1.25 SOLUTION, CONCENTRATE RESPIRATORY (INHALATION) at 10:08

## 2022-01-01 RX ADMIN — SODIUM CHLORIDE 4 MILLILITER(S): 9 INJECTION INTRAMUSCULAR; INTRAVENOUS; SUBCUTANEOUS at 22:31

## 2022-01-01 RX ADMIN — Medication 3 MILLILITER(S): at 04:01

## 2022-01-01 RX ADMIN — MORPHINE SULFATE 0.5 MILLIGRAM(S): 50 CAPSULE, EXTENDED RELEASE ORAL at 13:41

## 2022-01-01 RX ADMIN — POLYETHYLENE GLYCOL 3350 17 GRAM(S): 17 POWDER, FOR SOLUTION ORAL at 12:58

## 2022-01-01 RX ADMIN — Medication 1000 UNIT(S): at 12:08

## 2022-01-01 RX ADMIN — Medication 5 MILLIGRAM(S): at 21:43

## 2022-01-01 RX ADMIN — Medication 1 MILLILITER(S): at 10:37

## 2022-01-01 RX ADMIN — CEFTRIAXONE 100 MILLIGRAM(S): 500 INJECTION, POWDER, FOR SOLUTION INTRAMUSCULAR; INTRAVENOUS at 04:54

## 2022-01-01 RX ADMIN — PANTOPRAZOLE SODIUM 40 MILLIGRAM(S): 20 TABLET, DELAYED RELEASE ORAL at 12:49

## 2022-01-01 RX ADMIN — PIPERACILLIN AND TAZOBACTAM 25 GRAM(S): 4; .5 INJECTION, POWDER, LYOPHILIZED, FOR SOLUTION INTRAVENOUS at 12:55

## 2022-01-01 RX ADMIN — SODIUM CHLORIDE 4 MILLILITER(S): 9 INJECTION INTRAMUSCULAR; INTRAVENOUS; SUBCUTANEOUS at 21:14

## 2022-01-01 RX ADMIN — Medication 1 APPLICATION(S): at 12:43

## 2022-01-01 RX ADMIN — SERTRALINE 25 MILLIGRAM(S): 25 TABLET, FILM COATED ORAL at 12:44

## 2022-01-01 RX ADMIN — Medication 500 MICROGRAM(S): at 09:45

## 2022-01-01 RX ADMIN — Medication 500 MICROGRAM(S): at 13:39

## 2022-01-01 RX ADMIN — SODIUM CHLORIDE 4 MILLILITER(S): 9 INJECTION INTRAMUSCULAR; INTRAVENOUS; SUBCUTANEOUS at 10:08

## 2022-01-01 RX ADMIN — Medication 1: at 08:35

## 2022-01-01 RX ADMIN — Medication 100 MILLIEQUIVALENT(S): at 10:33

## 2022-01-01 RX ADMIN — LEVALBUTEROL 0.63 MILLIGRAM(S): 1.25 SOLUTION, CONCENTRATE RESPIRATORY (INHALATION) at 04:59

## 2022-01-01 RX ADMIN — Medication 25 GRAM(S): at 18:30

## 2022-01-01 RX ADMIN — POLYETHYLENE GLYCOL 3350 17 GRAM(S): 17 POWDER, FOR SOLUTION ORAL at 12:30

## 2022-01-01 RX ADMIN — Medication 1: at 17:14

## 2022-01-01 RX ADMIN — Medication 1 SUPPOSITORY(S): at 17:55

## 2022-01-01 RX ADMIN — Medication 4 MILLILITER(S): at 11:50

## 2022-01-01 RX ADMIN — SIMETHICONE 80 MILLIGRAM(S): 80 TABLET, CHEWABLE ORAL at 12:07

## 2022-01-01 RX ADMIN — Medication 3 MILLILITER(S): at 10:36

## 2022-01-01 RX ADMIN — Medication 500 MICROGRAM(S): at 04:03

## 2022-01-01 RX ADMIN — Medication 4 MILLILITER(S): at 21:13

## 2022-01-01 RX ADMIN — PANTOPRAZOLE SODIUM 40 MILLIGRAM(S): 20 TABLET, DELAYED RELEASE ORAL at 13:08

## 2022-01-01 RX ADMIN — Medication 260 MILLIGRAM(S): at 01:33

## 2022-01-01 RX ADMIN — Medication 30 MILLIGRAM(S): at 05:07

## 2022-01-01 RX ADMIN — Medication 1 PACKET(S): at 08:33

## 2022-01-01 RX ADMIN — HEPARIN SODIUM 5000 UNIT(S): 5000 INJECTION INTRAVENOUS; SUBCUTANEOUS at 05:47

## 2022-01-01 RX ADMIN — Medication 30 MILLIGRAM(S): at 05:28

## 2022-01-01 RX ADMIN — POLYETHYLENE GLYCOL 3350 17 GRAM(S): 17 POWDER, FOR SOLUTION ORAL at 17:12

## 2022-01-01 RX ADMIN — PIPERACILLIN AND TAZOBACTAM 25 GRAM(S): 4; .5 INJECTION, POWDER, LYOPHILIZED, FOR SOLUTION INTRAVENOUS at 18:30

## 2022-01-01 RX ADMIN — Medication 81 MILLIGRAM(S): at 13:37

## 2022-01-01 RX ADMIN — Medication 20 MILLIGRAM(S): at 05:43

## 2022-01-01 RX ADMIN — Medication 3 MILLILITER(S): at 04:19

## 2022-01-01 RX ADMIN — APIXABAN 2.5 MILLIGRAM(S): 2.5 TABLET, FILM COATED ORAL at 05:28

## 2022-01-01 RX ADMIN — Medication 500 MICROGRAM(S): at 03:24

## 2022-01-01 RX ADMIN — SENNA PLUS 1 TABLET(S): 8.6 TABLET ORAL at 21:23

## 2022-01-01 RX ADMIN — SODIUM CHLORIDE 4 MILLILITER(S): 9 INJECTION INTRAMUSCULAR; INTRAVENOUS; SUBCUTANEOUS at 04:13

## 2022-01-01 RX ADMIN — LACTULOSE 10 GRAM(S): 10 SOLUTION ORAL at 13:57

## 2022-01-01 RX ADMIN — Medication 100 MILLIEQUIVALENT(S): at 16:30

## 2022-01-01 RX ADMIN — LEVALBUTEROL 0.63 MILLIGRAM(S): 1.25 SOLUTION, CONCENTRATE RESPIRATORY (INHALATION) at 21:45

## 2022-01-01 RX ADMIN — Medication 650 MILLIGRAM(S): at 03:37

## 2022-01-01 RX ADMIN — Medication 2.5 MILLIGRAM(S): at 22:00

## 2022-01-01 RX ADMIN — Medication 650 MILLIGRAM(S): at 10:09

## 2022-01-01 RX ADMIN — Medication 650 MILLIGRAM(S): at 21:24

## 2022-01-01 RX ADMIN — PIPERACILLIN AND TAZOBACTAM 200 GRAM(S): 4; .5 INJECTION, POWDER, LYOPHILIZED, FOR SOLUTION INTRAVENOUS at 19:13

## 2022-01-01 RX ADMIN — SODIUM CHLORIDE 65 MILLILITER(S): 9 INJECTION, SOLUTION INTRAVENOUS at 11:57

## 2022-01-01 RX ADMIN — CHLORHEXIDINE GLUCONATE 1 APPLICATION(S): 213 SOLUTION TOPICAL at 11:58

## 2022-01-01 RX ADMIN — SODIUM CHLORIDE 50 MILLILITER(S): 9 INJECTION, SOLUTION INTRAVENOUS at 20:25

## 2022-01-01 RX ADMIN — FAMOTIDINE 20 MILLIGRAM(S): 10 INJECTION INTRAVENOUS at 23:31

## 2022-01-01 RX ADMIN — Medication 1 APPLICATION(S): at 17:47

## 2022-01-01 RX ADMIN — Medication 650 MILLIGRAM(S): at 09:54

## 2022-01-01 RX ADMIN — Medication 1 UNIT(S): at 12:50

## 2022-01-01 RX ADMIN — Medication 1000 UNIT(S): at 11:33

## 2022-01-01 RX ADMIN — MORPHINE SULFATE 0.5 MILLIGRAM(S): 50 CAPSULE, EXTENDED RELEASE ORAL at 15:18

## 2022-01-01 RX ADMIN — LEVALBUTEROL 0.63 MILLIGRAM(S): 1.25 SOLUTION, CONCENTRATE RESPIRATORY (INHALATION) at 22:31

## 2022-01-01 RX ADMIN — FLUCONAZOLE 100 MILLIGRAM(S): 150 TABLET ORAL at 12:49

## 2022-01-01 RX ADMIN — Medication 3 MILLILITER(S): at 00:30

## 2022-01-01 RX ADMIN — Medication 3 UNIT(S): at 12:59

## 2022-01-01 RX ADMIN — Medication 650 MILLIGRAM(S): at 12:27

## 2022-01-01 RX ADMIN — LEVALBUTEROL 0.63 MILLIGRAM(S): 1.25 SOLUTION, CONCENTRATE RESPIRATORY (INHALATION) at 23:00

## 2022-01-01 RX ADMIN — Medication 260 MILLIGRAM(S): at 09:24

## 2022-01-01 RX ADMIN — Medication 10 MILLIGRAM(S): at 13:08

## 2022-01-01 RX ADMIN — Medication 4 MILLILITER(S): at 09:44

## 2022-01-01 RX ADMIN — Medication 3 MILLILITER(S): at 16:13

## 2022-01-01 RX ADMIN — Medication 3 UNIT(S): at 08:48

## 2022-01-01 RX ADMIN — MUPIROCIN 1 APPLICATION(S): 20 OINTMENT TOPICAL at 17:26

## 2022-01-01 RX ADMIN — SIMETHICONE 80 MILLIGRAM(S): 80 TABLET, CHEWABLE ORAL at 12:49

## 2022-01-01 RX ADMIN — Medication 81 MILLIGRAM(S): at 12:00

## 2022-01-01 RX ADMIN — Medication 1 SUPPOSITORY(S): at 05:44

## 2022-01-01 RX ADMIN — Medication 1 MILLILITER(S): at 04:09

## 2022-01-01 RX ADMIN — LEVALBUTEROL 0.63 MILLIGRAM(S): 1.25 SOLUTION, CONCENTRATE RESPIRATORY (INHALATION) at 10:54

## 2022-01-01 RX ADMIN — Medication 500 MICROGRAM(S): at 22:54

## 2022-01-01 RX ADMIN — Medication 40 MILLIEQUIVALENT(S): at 13:31

## 2022-01-01 RX ADMIN — POLYETHYLENE GLYCOL 3350 17 GRAM(S): 17 POWDER, FOR SOLUTION ORAL at 19:17

## 2022-01-01 RX ADMIN — MUPIROCIN 1 APPLICATION(S): 20 OINTMENT TOPICAL at 17:29

## 2022-01-01 RX ADMIN — Medication 240 MILLIGRAM(S): at 13:34

## 2022-01-01 RX ADMIN — Medication 500 MICROGRAM(S): at 04:09

## 2022-01-01 RX ADMIN — Medication 250 MILLIGRAM(S): at 14:05

## 2022-01-01 RX ADMIN — Medication 2.5 MILLIGRAM(S): at 14:24

## 2022-01-01 RX ADMIN — Medication 500 MICROGRAM(S): at 16:50

## 2022-01-01 RX ADMIN — Medication 240 MILLIGRAM(S): at 05:41

## 2022-01-01 RX ADMIN — Medication 1 MILLILITER(S): at 16:19

## 2022-01-01 RX ADMIN — Medication 1000 UNIT(S): at 13:08

## 2022-01-01 RX ADMIN — Medication 2 UNIT(S): at 12:29

## 2022-01-01 RX ADMIN — Medication 10 MILLIGRAM(S): at 12:05

## 2022-01-01 RX ADMIN — Medication 1 MILLILITER(S): at 22:40

## 2022-01-01 RX ADMIN — SIMETHICONE 80 MILLIGRAM(S): 80 TABLET, CHEWABLE ORAL at 18:40

## 2022-01-01 RX ADMIN — LACTULOSE 10 GRAM(S): 10 SOLUTION ORAL at 22:29

## 2022-01-01 RX ADMIN — Medication 1 MILLILITER(S): at 16:23

## 2022-01-01 RX ADMIN — PANTOPRAZOLE SODIUM 40 MILLIGRAM(S): 20 TABLET, DELAYED RELEASE ORAL at 12:07

## 2022-01-01 RX ADMIN — SERTRALINE 25 MILLIGRAM(S): 25 TABLET, FILM COATED ORAL at 12:00

## 2022-01-01 RX ADMIN — Medication 5 MILLIGRAM(S): at 21:38

## 2022-01-01 RX ADMIN — MUPIROCIN 1 APPLICATION(S): 20 OINTMENT TOPICAL at 05:34

## 2022-01-01 RX ADMIN — Medication 3 UNIT(S): at 13:33

## 2022-01-01 RX ADMIN — SIMETHICONE 80 MILLIGRAM(S): 80 TABLET, CHEWABLE ORAL at 18:26

## 2022-01-01 RX ADMIN — APIXABAN 2.5 MILLIGRAM(S): 2.5 TABLET, FILM COATED ORAL at 17:56

## 2022-01-01 RX ADMIN — FAMOTIDINE 20 MILLIGRAM(S): 10 INJECTION INTRAVENOUS at 12:00

## 2022-01-01 RX ADMIN — Medication 3 UNIT(S): at 09:09

## 2022-01-01 RX ADMIN — Medication 2.5 MILLIGRAM(S): at 22:10

## 2022-01-01 RX ADMIN — Medication 240 MILLIGRAM(S): at 06:00

## 2022-01-01 RX ADMIN — Medication 1000 MILLIGRAM(S): at 02:48

## 2022-01-01 RX ADMIN — Medication 240 MILLIGRAM(S): at 05:44

## 2022-01-01 RX ADMIN — Medication 10 MILLIGRAM(S): at 13:11

## 2022-01-01 RX ADMIN — Medication 650 MILLIGRAM(S): at 20:59

## 2022-01-01 RX ADMIN — Medication 2: at 12:10

## 2022-01-01 RX ADMIN — Medication 2 UNIT(S): at 09:16

## 2022-01-01 RX ADMIN — Medication 4: at 13:12

## 2022-01-01 RX ADMIN — PANTOPRAZOLE SODIUM 40 MILLIGRAM(S): 20 TABLET, DELAYED RELEASE ORAL at 05:41

## 2022-01-01 RX ADMIN — Medication 500 MICROGRAM(S): at 09:15

## 2022-01-01 RX ADMIN — Medication 500 MICROGRAM(S): at 21:31

## 2022-01-01 RX ADMIN — Medication 4 MILLILITER(S): at 22:31

## 2022-01-01 RX ADMIN — SIMETHICONE 80 MILLIGRAM(S): 80 TABLET, CHEWABLE ORAL at 17:44

## 2022-01-01 RX ADMIN — HEPARIN SODIUM 5000 UNIT(S): 5000 INJECTION INTRAVENOUS; SUBCUTANEOUS at 06:33

## 2022-01-01 RX ADMIN — Medication 1 MILLILITER(S): at 16:52

## 2022-01-01 RX ADMIN — LACTULOSE 10 GRAM(S): 10 SOLUTION ORAL at 04:55

## 2022-01-01 RX ADMIN — POLYETHYLENE GLYCOL 3350 17 GRAM(S): 17 POWDER, FOR SOLUTION ORAL at 05:59

## 2022-01-01 RX ADMIN — SODIUM CHLORIDE 4 MILLILITER(S): 9 INJECTION INTRAMUSCULAR; INTRAVENOUS; SUBCUTANEOUS at 11:46

## 2022-01-01 RX ADMIN — Medication 500 MICROGRAM(S): at 10:37

## 2022-01-01 RX ADMIN — APIXABAN 2.5 MILLIGRAM(S): 2.5 TABLET, FILM COATED ORAL at 05:45

## 2022-01-01 RX ADMIN — PIPERACILLIN AND TAZOBACTAM 25 GRAM(S): 4; .5 INJECTION, POWDER, LYOPHILIZED, FOR SOLUTION INTRAVENOUS at 03:40

## 2022-01-01 RX ADMIN — Medication 650 MILLIGRAM(S): at 05:43

## 2022-01-01 RX ADMIN — SODIUM CHLORIDE 50 MILLILITER(S): 9 INJECTION, SOLUTION INTRAVENOUS at 12:12

## 2022-01-01 RX ADMIN — SENNA PLUS 2 TABLET(S): 8.6 TABLET ORAL at 21:37

## 2022-01-01 RX ADMIN — FLUCONAZOLE 100 MILLIGRAM(S): 150 TABLET ORAL at 12:08

## 2022-01-01 RX ADMIN — LEVALBUTEROL 0.63 MILLIGRAM(S): 1.25 SOLUTION, CONCENTRATE RESPIRATORY (INHALATION) at 11:46

## 2022-01-01 RX ADMIN — Medication 81 MILLIGRAM(S): at 12:44

## 2022-01-01 RX ADMIN — CEFTRIAXONE 100 MILLIGRAM(S): 500 INJECTION, POWDER, FOR SOLUTION INTRAMUSCULAR; INTRAVENOUS at 05:43

## 2022-01-01 RX ADMIN — SODIUM CHLORIDE 4 MILLILITER(S): 9 INJECTION INTRAMUSCULAR; INTRAVENOUS; SUBCUTANEOUS at 21:38

## 2022-01-01 RX ADMIN — Medication 240 MILLIGRAM(S): at 11:57

## 2022-01-01 RX ADMIN — FLUCONAZOLE 100 MILLIGRAM(S): 150 TABLET ORAL at 13:09

## 2022-01-01 RX ADMIN — SERTRALINE 25 MILLIGRAM(S): 25 TABLET, FILM COATED ORAL at 13:13

## 2022-01-01 RX ADMIN — Medication 500 MICROGRAM(S): at 22:43

## 2022-01-01 RX ADMIN — Medication 100 MILLIEQUIVALENT(S): at 18:46

## 2022-01-01 RX ADMIN — Medication 1: at 17:25

## 2022-01-01 RX ADMIN — Medication 2.5 MILLIGRAM(S): at 05:03

## 2022-01-01 RX ADMIN — Medication 3 UNIT(S): at 09:05

## 2022-01-01 RX ADMIN — Medication 5 MILLIGRAM(S): at 21:37

## 2022-01-01 RX ADMIN — APIXABAN 2.5 MILLIGRAM(S): 2.5 TABLET, FILM COATED ORAL at 05:08

## 2022-01-01 RX ADMIN — Medication 1 MILLILITER(S): at 09:54

## 2022-01-01 RX ADMIN — PIPERACILLIN AND TAZOBACTAM 25 GRAM(S): 4; .5 INJECTION, POWDER, LYOPHILIZED, FOR SOLUTION INTRAVENOUS at 11:48

## 2022-01-01 RX ADMIN — Medication 500 MICROGRAM(S): at 03:48

## 2022-01-01 RX ADMIN — Medication 1 APPLICATION(S): at 12:02

## 2022-01-01 RX ADMIN — Medication 10 MILLIGRAM(S): at 11:27

## 2022-01-01 RX ADMIN — Medication 1 APPLICATION(S): at 12:31

## 2022-01-01 RX ADMIN — Medication 3 UNIT(S): at 17:51

## 2022-01-01 RX ADMIN — PIPERACILLIN AND TAZOBACTAM 25 GRAM(S): 4; .5 INJECTION, POWDER, LYOPHILIZED, FOR SOLUTION INTRAVENOUS at 05:02

## 2022-01-01 RX ADMIN — HEPARIN SODIUM 5000 UNIT(S): 5000 INJECTION INTRAVENOUS; SUBCUTANEOUS at 18:41

## 2022-01-01 RX ADMIN — Medication 3 MILLILITER(S): at 22:32

## 2022-01-01 RX ADMIN — Medication 1000 UNIT(S): at 12:31

## 2022-01-01 RX ADMIN — Medication 4 MILLILITER(S): at 23:00

## 2022-01-01 RX ADMIN — Medication 30 MILLILITER(S): at 21:25

## 2022-01-01 RX ADMIN — Medication 2.5 MILLIGRAM(S): at 05:43

## 2022-01-01 RX ADMIN — Medication 500 MICROGRAM(S): at 09:57

## 2022-01-01 RX ADMIN — Medication 1 PACKET(S): at 12:28

## 2022-01-01 RX ADMIN — APIXABAN 2.5 MILLIGRAM(S): 2.5 TABLET, FILM COATED ORAL at 17:13

## 2022-01-01 RX ADMIN — LEVALBUTEROL 0.63 MILLIGRAM(S): 1.25 SOLUTION, CONCENTRATE RESPIRATORY (INHALATION) at 04:36

## 2022-01-01 RX ADMIN — Medication 1 MILLILITER(S): at 15:44

## 2022-01-01 RX ADMIN — Medication 2: at 09:14

## 2022-01-01 RX ADMIN — MUPIROCIN 1 APPLICATION(S): 20 OINTMENT TOPICAL at 20:34

## 2022-01-01 RX ADMIN — FLUCONAZOLE 100 MILLIGRAM(S): 150 TABLET ORAL at 12:01

## 2022-01-01 RX ADMIN — Medication 10 MILLIGRAM(S): at 22:37

## 2022-01-01 RX ADMIN — Medication 500 MICROGRAM(S): at 03:32

## 2022-01-01 RX ADMIN — Medication 1 MILLILITER(S): at 09:53

## 2022-01-01 RX ADMIN — Medication 2 UNIT(S): at 18:17

## 2022-01-01 RX ADMIN — Medication 1 MILLILITER(S): at 22:43

## 2022-01-01 RX ADMIN — Medication 240 MILLIGRAM(S): at 05:56

## 2022-01-01 RX ADMIN — Medication 3 UNIT(S): at 13:09

## 2022-01-01 RX ADMIN — Medication 250 MILLIGRAM(S): at 10:44

## 2022-01-01 RX ADMIN — Medication 2 UNIT(S): at 18:39

## 2022-01-01 RX ADMIN — SENNA PLUS 2 TABLET(S): 8.6 TABLET ORAL at 21:38

## 2022-01-01 RX ADMIN — FAMOTIDINE 20 MILLIGRAM(S): 10 INJECTION INTRAVENOUS at 12:28

## 2022-01-01 RX ADMIN — MUPIROCIN 1 APPLICATION(S): 20 OINTMENT TOPICAL at 05:36

## 2022-01-01 RX ADMIN — Medication 1: at 00:10

## 2022-01-01 RX ADMIN — POLYETHYLENE GLYCOL 3350 17 GRAM(S): 17 POWDER, FOR SOLUTION ORAL at 06:05

## 2022-01-01 RX ADMIN — Medication 400 MILLIGRAM(S): at 03:03

## 2022-01-01 RX ADMIN — Medication 81 MILLIGRAM(S): at 12:29

## 2022-01-01 RX ADMIN — POLYETHYLENE GLYCOL 3350 17 GRAM(S): 17 POWDER, FOR SOLUTION ORAL at 11:51

## 2022-01-01 RX ADMIN — Medication 1: at 11:57

## 2022-01-01 RX ADMIN — Medication 1 APPLICATION(S): at 11:33

## 2022-01-01 RX ADMIN — Medication 30 MILLIGRAM(S): at 05:48

## 2022-01-01 RX ADMIN — Medication 1 MILLILITER(S): at 03:47

## 2022-01-01 RX ADMIN — Medication 500 MICROGRAM(S): at 22:36

## 2022-01-01 RX ADMIN — Medication 40 MILLIEQUIVALENT(S): at 11:57

## 2022-01-01 RX ADMIN — PIPERACILLIN AND TAZOBACTAM 25 GRAM(S): 4; .5 INJECTION, POWDER, LYOPHILIZED, FOR SOLUTION INTRAVENOUS at 11:57

## 2022-01-01 RX ADMIN — SODIUM CHLORIDE 50 MILLILITER(S): 9 INJECTION, SOLUTION INTRAVENOUS at 21:21

## 2022-01-01 RX ADMIN — CHLORHEXIDINE GLUCONATE 1 APPLICATION(S): 213 SOLUTION TOPICAL at 14:25

## 2022-01-01 RX ADMIN — SODIUM CHLORIDE 4 MILLILITER(S): 9 INJECTION INTRAMUSCULAR; INTRAVENOUS; SUBCUTANEOUS at 17:33

## 2022-01-01 RX ADMIN — Medication 50 MILLILITER(S): at 18:00

## 2022-01-01 RX ADMIN — MUPIROCIN 1 APPLICATION(S): 20 OINTMENT TOPICAL at 17:32

## 2022-01-01 RX ADMIN — APIXABAN 2.5 MILLIGRAM(S): 2.5 TABLET, FILM COATED ORAL at 06:00

## 2022-01-01 RX ADMIN — Medication 4 MILLILITER(S): at 10:36

## 2022-01-01 RX ADMIN — POLYETHYLENE GLYCOL 3350 17 GRAM(S): 17 POWDER, FOR SOLUTION ORAL at 18:19

## 2022-01-01 RX ADMIN — Medication 3 UNIT(S): at 18:23

## 2022-01-01 RX ADMIN — SODIUM CHLORIDE 500 MILLILITER(S): 9 INJECTION INTRAMUSCULAR; INTRAVENOUS; SUBCUTANEOUS at 11:09

## 2022-01-01 RX ADMIN — SENNA PLUS 2 TABLET(S): 8.6 TABLET ORAL at 22:42

## 2022-01-01 RX ADMIN — SODIUM CHLORIDE 4 MILLILITER(S): 9 INJECTION INTRAMUSCULAR; INTRAVENOUS; SUBCUTANEOUS at 17:01

## 2022-01-01 RX ADMIN — FLUCONAZOLE 100 MILLIGRAM(S): 150 TABLET ORAL at 12:31

## 2022-01-01 RX ADMIN — Medication 2.5 MILLIGRAM(S): at 21:54

## 2022-01-01 RX ADMIN — Medication 1 MILLILITER(S): at 09:15

## 2022-01-01 RX ADMIN — SIMETHICONE 80 MILLIGRAM(S): 80 TABLET, CHEWABLE ORAL at 13:09

## 2022-01-01 RX ADMIN — Medication 25 GRAM(S): at 09:40

## 2022-01-01 RX ADMIN — Medication 1 APPLICATION(S): at 12:30

## 2022-01-01 RX ADMIN — APIXABAN 2.5 MILLIGRAM(S): 2.5 TABLET, FILM COATED ORAL at 17:29

## 2022-01-01 RX ADMIN — Medication 10 MILLIGRAM(S): at 12:31

## 2022-01-01 RX ADMIN — Medication 10 MILLIGRAM(S): at 12:15

## 2022-01-01 RX ADMIN — POLYETHYLENE GLYCOL 3350 17 GRAM(S): 17 POWDER, FOR SOLUTION ORAL at 17:29

## 2022-01-01 RX ADMIN — POLYETHYLENE GLYCOL 3350 17 GRAM(S): 17 POWDER, FOR SOLUTION ORAL at 18:40

## 2022-01-01 RX ADMIN — SENNA PLUS 2 TABLET(S): 8.6 TABLET ORAL at 21:15

## 2022-01-01 RX ADMIN — OLANZAPINE 1.25 MILLIGRAM(S): 15 TABLET, FILM COATED ORAL at 17:13

## 2022-01-01 RX ADMIN — Medication 250 MILLIGRAM(S): at 18:00

## 2022-01-01 RX ADMIN — MUPIROCIN 1 APPLICATION(S): 20 OINTMENT TOPICAL at 05:09

## 2022-01-01 RX ADMIN — Medication 400 MILLIGRAM(S): at 02:18

## 2022-01-01 RX ADMIN — SIMETHICONE 80 MILLIGRAM(S): 80 TABLET, CHEWABLE ORAL at 05:43

## 2022-01-01 RX ADMIN — Medication 81 MILLIGRAM(S): at 11:32

## 2022-01-01 RX ADMIN — SERTRALINE 25 MILLIGRAM(S): 25 TABLET, FILM COATED ORAL at 11:51

## 2022-01-01 RX ADMIN — Medication 0.5 MILLIGRAM(S): at 09:44

## 2022-01-01 RX ADMIN — Medication 1 MILLILITER(S): at 21:47

## 2022-01-01 RX ADMIN — Medication 1: at 18:28

## 2022-01-01 RX ADMIN — Medication 4: at 12:50

## 2022-01-01 RX ADMIN — Medication 20 MILLIGRAM(S): at 05:18

## 2022-01-01 RX ADMIN — LEVALBUTEROL 0.63 MILLIGRAM(S): 1.25 SOLUTION, CONCENTRATE RESPIRATORY (INHALATION) at 17:32

## 2022-01-01 RX ADMIN — Medication 500 MICROGRAM(S): at 22:41

## 2022-01-01 RX ADMIN — LEVALBUTEROL 0.63 MILLIGRAM(S): 1.25 SOLUTION, CONCENTRATE RESPIRATORY (INHALATION) at 21:38

## 2022-01-01 RX ADMIN — Medication 1 PACKET(S): at 09:06

## 2022-01-01 RX ADMIN — INSULIN HUMAN 10 UNIT(S): 100 INJECTION, SOLUTION SUBCUTANEOUS at 18:16

## 2022-01-01 RX ADMIN — Medication 500 MICROGRAM(S): at 16:19

## 2022-01-01 RX ADMIN — Medication 1 APPLICATION(S): at 12:48

## 2022-01-01 RX ADMIN — Medication 650 MILLIGRAM(S): at 22:08

## 2022-01-01 RX ADMIN — Medication 1: at 12:29

## 2022-01-01 RX ADMIN — Medication 1 MILLILITER(S): at 15:47

## 2022-01-01 RX ADMIN — Medication 30 MILLIGRAM(S): at 06:32

## 2022-01-01 RX ADMIN — Medication 85 MILLIMOLE(S): at 08:45

## 2022-01-01 RX ADMIN — LACTULOSE 10 GRAM(S): 10 SOLUTION ORAL at 06:00

## 2022-01-01 RX ADMIN — MORPHINE SULFATE 0.5 MILLIGRAM(S): 50 CAPSULE, EXTENDED RELEASE ORAL at 16:50

## 2022-01-01 RX ADMIN — Medication 100 MILLIEQUIVALENT(S): at 15:23

## 2022-01-01 RX ADMIN — LACTULOSE 10 GRAM(S): 10 SOLUTION ORAL at 05:27

## 2022-01-01 RX ADMIN — SODIUM CHLORIDE 4 MILLILITER(S): 9 INJECTION INTRAMUSCULAR; INTRAVENOUS; SUBCUTANEOUS at 04:17

## 2022-01-01 RX ADMIN — FLUCONAZOLE 100 MILLIGRAM(S): 150 TABLET ORAL at 12:44

## 2022-01-01 RX ADMIN — Medication 650 MILLIGRAM(S): at 04:00

## 2022-01-01 RX ADMIN — Medication 1 APPLICATION(S): at 13:10

## 2022-01-01 RX ADMIN — POLYETHYLENE GLYCOL 3350 17 GRAM(S): 17 POWDER, FOR SOLUTION ORAL at 18:39

## 2022-01-01 RX ADMIN — SERTRALINE 25 MILLIGRAM(S): 25 TABLET, FILM COATED ORAL at 13:38

## 2022-01-01 RX ADMIN — Medication 1: at 07:50

## 2022-01-01 RX ADMIN — FAMOTIDINE 20 MILLIGRAM(S): 10 INJECTION INTRAVENOUS at 13:38

## 2022-01-01 RX ADMIN — Medication 1 SUPPOSITORY(S): at 18:19

## 2022-01-01 RX ADMIN — Medication 3: at 13:33

## 2022-01-01 RX ADMIN — Medication 2: at 11:47

## 2022-01-01 RX ADMIN — Medication 500 MICROGRAM(S): at 23:20

## 2022-01-01 RX ADMIN — Medication 1: at 09:09

## 2022-01-01 RX ADMIN — Medication 650 MILLIGRAM(S): at 02:21

## 2022-01-01 RX ADMIN — HEPARIN SODIUM 5000 UNIT(S): 5000 INJECTION INTRAVENOUS; SUBCUTANEOUS at 17:29

## 2022-01-01 RX ADMIN — Medication 2: at 17:29

## 2022-01-01 RX ADMIN — Medication 81 MILLIGRAM(S): at 12:31

## 2022-01-01 RX ADMIN — Medication 1 UNIT(S): at 08:45

## 2022-01-01 RX ADMIN — Medication 260 MILLIGRAM(S): at 09:39

## 2022-01-01 RX ADMIN — LEVALBUTEROL 0.63 MILLIGRAM(S): 1.25 SOLUTION, CONCENTRATE RESPIRATORY (INHALATION) at 21:14

## 2022-01-01 RX ADMIN — Medication 260 MILLIGRAM(S): at 09:30

## 2022-01-01 RX ADMIN — Medication 81 MILLIGRAM(S): at 13:07

## 2022-01-01 RX ADMIN — PIPERACILLIN AND TAZOBACTAM 25 GRAM(S): 4; .5 INJECTION, POWDER, LYOPHILIZED, FOR SOLUTION INTRAVENOUS at 20:52

## 2022-01-01 RX ADMIN — Medication 240 MILLIGRAM(S): at 05:28

## 2022-01-01 RX ADMIN — SERTRALINE 25 MILLIGRAM(S): 25 TABLET, FILM COATED ORAL at 13:07

## 2022-01-01 RX ADMIN — POLYETHYLENE GLYCOL 3350 17 GRAM(S): 17 POWDER, FOR SOLUTION ORAL at 05:45

## 2022-01-01 RX ADMIN — Medication 4 MILLILITER(S): at 10:06

## 2022-01-01 RX ADMIN — POLYETHYLENE GLYCOL 3350 17 GRAM(S): 17 POWDER, FOR SOLUTION ORAL at 06:32

## 2022-01-01 RX ADMIN — Medication 0.25 MILLIGRAM(S): at 21:25

## 2022-01-01 RX ADMIN — PIPERACILLIN AND TAZOBACTAM 25 GRAM(S): 4; .5 INJECTION, POWDER, LYOPHILIZED, FOR SOLUTION INTRAVENOUS at 19:37

## 2022-01-01 RX ADMIN — PIPERACILLIN AND TAZOBACTAM 25 GRAM(S): 4; .5 INJECTION, POWDER, LYOPHILIZED, FOR SOLUTION INTRAVENOUS at 04:31

## 2022-01-01 RX ADMIN — POLYETHYLENE GLYCOL 3350 17 GRAM(S): 17 POWDER, FOR SOLUTION ORAL at 18:07

## 2022-01-01 RX ADMIN — Medication 30 MILLIGRAM(S): at 13:35

## 2022-01-01 RX ADMIN — Medication 500 MICROGRAM(S): at 09:51

## 2022-01-01 RX ADMIN — Medication 260 MILLIGRAM(S): at 11:57

## 2022-01-01 RX ADMIN — Medication 500 MICROGRAM(S): at 16:24

## 2022-01-01 RX ADMIN — PANTOPRAZOLE SODIUM 40 MILLIGRAM(S): 20 TABLET, DELAYED RELEASE ORAL at 11:32

## 2022-01-01 RX ADMIN — Medication 500 MICROGRAM(S): at 21:47

## 2022-01-01 RX ADMIN — PIPERACILLIN AND TAZOBACTAM 25 GRAM(S): 4; .5 INJECTION, POWDER, LYOPHILIZED, FOR SOLUTION INTRAVENOUS at 04:11

## 2022-01-01 RX ADMIN — PIPERACILLIN AND TAZOBACTAM 25 GRAM(S): 4; .5 INJECTION, POWDER, LYOPHILIZED, FOR SOLUTION INTRAVENOUS at 03:22

## 2022-01-01 RX ADMIN — Medication 650 MILLIGRAM(S): at 21:29

## 2022-01-01 RX ADMIN — Medication 2.5 MILLIGRAM(S): at 15:24

## 2022-01-01 RX ADMIN — HEPARIN SODIUM 5000 UNIT(S): 5000 INJECTION INTRAVENOUS; SUBCUTANEOUS at 05:45

## 2022-01-01 RX ADMIN — SODIUM CHLORIDE 1000 MILLILITER(S): 9 INJECTION INTRAMUSCULAR; INTRAVENOUS; SUBCUTANEOUS at 01:05

## 2022-01-01 RX ADMIN — Medication 240 MILLIGRAM(S): at 05:47

## 2022-01-01 RX ADMIN — Medication 2: at 12:29

## 2022-01-01 RX ADMIN — Medication 0.5 MILLIGRAM(S): at 12:00

## 2022-01-01 RX ADMIN — LEVALBUTEROL 0.63 MILLIGRAM(S): 1.25 SOLUTION, CONCENTRATE RESPIRATORY (INHALATION) at 17:01

## 2022-01-01 RX ADMIN — POLYETHYLENE GLYCOL 3350 17 GRAM(S): 17 POWDER, FOR SOLUTION ORAL at 13:13

## 2022-01-01 RX ADMIN — PIPERACILLIN AND TAZOBACTAM 25 GRAM(S): 4; .5 INJECTION, POWDER, LYOPHILIZED, FOR SOLUTION INTRAVENOUS at 20:32

## 2022-01-01 RX ADMIN — Medication 500 MICROGRAM(S): at 15:43

## 2022-01-01 RX ADMIN — POTASSIUM PHOSPHATE, MONOBASIC POTASSIUM PHOSPHATE, DIBASIC 62.5 MILLIMOLE(S): 236; 224 INJECTION, SOLUTION INTRAVENOUS at 08:36

## 2022-01-01 RX ADMIN — Medication 10 MILLIGRAM(S): at 11:33

## 2022-01-01 RX ADMIN — Medication 3: at 12:59

## 2022-01-01 RX ADMIN — POLYETHYLENE GLYCOL 3350 17 GRAM(S): 17 POWDER, FOR SOLUTION ORAL at 13:07

## 2022-01-01 RX ADMIN — Medication 500 MICROGRAM(S): at 15:48

## 2022-01-01 RX ADMIN — SERTRALINE 25 MILLIGRAM(S): 25 TABLET, FILM COATED ORAL at 12:49

## 2022-01-01 RX ADMIN — Medication 250 MILLIGRAM(S): at 02:02

## 2022-01-01 RX ADMIN — PIPERACILLIN AND TAZOBACTAM 25 GRAM(S): 4; .5 INJECTION, POWDER, LYOPHILIZED, FOR SOLUTION INTRAVENOUS at 02:28

## 2022-01-01 RX ADMIN — Medication 2 UNIT(S): at 09:14

## 2022-01-01 RX ADMIN — Medication 0.5 MILLIGRAM(S): at 10:08

## 2022-01-01 RX ADMIN — Medication 30 MILLIGRAM(S): at 05:56

## 2022-01-01 RX ADMIN — Medication 20 MILLIEQUIVALENT(S): at 18:38

## 2022-01-01 RX ADMIN — Medication 650 MILLIGRAM(S): at 21:13

## 2022-01-01 RX ADMIN — Medication 4: at 06:54

## 2022-01-01 RX ADMIN — Medication 3 UNIT(S): at 18:39

## 2022-01-01 RX ADMIN — SENNA PLUS 2 TABLET(S): 8.6 TABLET ORAL at 20:59

## 2022-01-01 RX ADMIN — Medication 240 MILLIGRAM(S): at 06:32

## 2022-01-01 RX ADMIN — Medication 1 SUPPOSITORY(S): at 06:33

## 2022-01-01 RX ADMIN — Medication 1 PACKET(S): at 10:34

## 2022-01-01 RX ADMIN — SIMETHICONE 80 MILLIGRAM(S): 80 TABLET, CHEWABLE ORAL at 23:07

## 2022-01-01 RX ADMIN — Medication 3 UNIT(S): at 13:28

## 2022-01-01 RX ADMIN — Medication 240 MILLIGRAM(S): at 05:06

## 2022-01-01 RX ADMIN — Medication 1: at 17:51

## 2022-01-01 RX ADMIN — PANTOPRAZOLE SODIUM 40 MILLIGRAM(S): 20 TABLET, DELAYED RELEASE ORAL at 11:48

## 2022-01-01 RX ADMIN — Medication 1 SUPPOSITORY(S): at 05:56

## 2022-01-01 RX ADMIN — CEFTRIAXONE 100 MILLIGRAM(S): 500 INJECTION, POWDER, FOR SOLUTION INTRAMUSCULAR; INTRAVENOUS at 05:59

## 2022-06-21 NOTE — BH CONSULTATION LIAISON ASSESSMENT NOTE - NSBHCHARTREVIEWLAB_PSY_A_CORE FT
CBC Full  -  ( 21 Jun 2022 14:16 )  WBC Count : 6.97 K/uL  RBC Count : 3.62 M/uL  Hemoglobin : 10.8 g/dL  Hematocrit : 34.0 %  Platelet Count - Automated : 296 K/uL  Mean Cell Volume : 93.9 fL  Mean Cell Hemoglobin : 29.8 pg  Mean Cell Hemoglobin Concentration : 31.8 gm/dL  Auto Neutrophil # : x  Auto Lymphocyte # : x  Auto Monocyte # : x  Auto Eosinophil # : x  Auto Basophil # : x  Auto Neutrophil % : x  Auto Lymphocyte % : x  Auto Monocyte % : x  Auto Eosinophil % : x  Auto Basophil % : x  06-21    143  |  103  |  17  ----------------------------<  110<H>  3.4<L>   |  32<H>  |  0.47<L>    Ca    8.9      21 Jun 2022 14:16  Phos  2.7     06-21  Mg     2.10     06-21    TPro  6.1  /  Alb  2.8<L>  /  TBili  0.3  /  DBili  x   /  AST  23  /  ALT  37  /  AlkPhos  161<H>  06-21

## 2022-06-21 NOTE — CONSULT NOTE ADULT - SUBJECTIVE AND OBJECTIVE BOX
Reason For Consult: DM    HPI:  Patient is an 86 year old Male with a PMHx of pulmonary fibrosis on chronic prednisone and home O2 4L NC, T2DM, HTN, AFib (on Eliquis), GERD, dysphagia (on ground diet and thin liquid diet at NH), protein-calorie malnutrition, and MDD (Q15 minute safety checks at NH for SI), chronic constipation, recent oral thrush, who presented to the ED with abdominal pain. Patient endorses intermittent, LLQ and suprapubic abdominal pain that has been intermittent over the last month but acutely worsened over the past 3 days.   As per his daughter, he has not been eating much due to decreased appetite.  CT scan was performed which showed distended gallbladder with minimal gallbladder wall hyperemia. Patchy areas of decreased enhancement in both kidneys which raises concern for pyelonephritis. Large fecal load in the rectum with mild perirectal fatty infiltration which raises question of stercoral colitis.Prostatomegaly. He had oneill catheter inserted in ED. He was given a dose of 2gm IV CTX and 1L NS as well as lactulose and IV tylenol. His vital signs were stable. His fingerstick was found to be 38 and he was given D50 with good response in sugar, admitted to feeling a little "shaky" at the time but was awake and alert.     endocrine called for hypoglcyemia  i called [employer] [id number], [place]  pt states he "has pain all over and feels bad"  pt did not participate in full hx     i called his son jodi, pt was in rehab recently and than [facility], at the rehab he was given insulin  pt had low PO intake past few weeks as per the son due to throat pain  prior to the rehab the pt has no hx of DM     his glucoses are in the 100s after intervention   no a1c in chart       PAST MEDICAL & SURGICAL HISTORY:  DM (diabetes mellitus)      Pulmonary fibrosis  on home O2 and daily prednisone      HTN (hypertension)      Chronic atrial fibrillation      Dysphagia  recent cadida infection, s/p nystatin swish and swallow      GERD (gastroesophageal reflux disease)      Protein calorie malnutrition      MDD (major depressive disorder)      Constipation      S/P hernia repair      H/O cataract extraction      History of prostate surgery  prostate was &quot;scraped&quot; 2021          FAMILY HISTORY:  No pertinent family history in first degree relatives        Social History:  lives with family     Outpatient Medications:    Home Medications:   * Patient Currently Takes Medications as of 21-Jun-2022 09:57 documented in Structured Notes  · 	Atrovent 500 mcg/2.5 mL inhalation solution: 2.5 milliliter(s) inhaled every 6 hours  · 	famotidine 20 mg oral tablet: 1 tab(s) orally once a day  · 	fluconazole 100 mg oral tablet: 1 tab(s) orally once a day  · 	Lantus 100 units/mL subcutaneous solution: 16 unit(s) subcutaneous once a day (at bedtime)  · 	HumaLOG 100 units/mL subcutaneous solution: sliding scale with meals and HS   · 	aspirin 81 mg oral delayed release tablet: 1 tab(s) orally once a day  · 	docusate sodium 100 mg oral tablet: 1 tab(s) orally 2 times a day  · 	Tylenol 325 mg oral tablet: 2 tab(s) orally every 6 hours, As Needed  · 	acetylcysteine 10% inhalation solution: 2 milliliter(s) inhaled by nebulizer route every 6 hours  · 	Maalox Advanced Regular Strength oral suspension: 30 milliliter(s) orally every 4 hours, As Needed  · 	predniSONE 20 mg oral tablet: 1.5 tab(s) orally once a day (30mg)  · 	MiraLax oral powder for reconstitution: 17 gram(s) orally once a day  · 	Senna 8.6 mg oral tablet: 1 tab(s) orally once a day (at bedtime)  · 	Incruse Ellipta 62.5 mcg/inh inhalation powder: 1 puff(s) inhaled every 24 hours  · 	Silvadene 1% topical cream: Apply topically to affected area once a day  · 	sertraline 25 mg oral tablet: 1 tab(s) orally once a day  · 	apixaban 2.5 mg oral tablet: 1 tab(s) orally 2 times a day  · 	DilTIAZem (Eqv-Cardizem CD) 240 mg/24 hours oral capsule, extended release: 1 cap(s) orally once a day  · 	lactulose 10 g/15 mL oral syrup: 30 milliliter(s) orally 2 times a day x 5 days     MEDICATIONS  (STANDING):  acetylcysteine 10%  Inhalation 2 milliLiter(s) Inhalation every 6 hours  apixaban 2.5 milliGRAM(s) Oral every 12 hours  aspirin  chewable 81 milliGRAM(s) Oral daily  dextrose 5%. 1000 milliLiter(s) (50 mL/Hr) IV Continuous <Continuous>  dextrose 5%. 1000 milliLiter(s) (100 mL/Hr) IV Continuous <Continuous>  dextrose 50% Injectable 25 Gram(s) IV Push once  dextrose 50% Injectable 12.5 Gram(s) IV Push once  dextrose 50% Injectable 25 Gram(s) IV Push once  diltiazem    milliGRAM(s) Oral daily  famotidine    Tablet 20 milliGRAM(s) Oral daily  glucagon  Injectable 1 milliGRAM(s) IntraMuscular once  ipratropium    for Nebulization 500 MICROGram(s) Nebulizer every 6 hours  lactulose Syrup 10 Gram(s) Oral three times a day  polyethylene glycol 3350 17 Gram(s) Oral two times a day  predniSONE   Tablet 30 milliGRAM(s) Oral daily  senna 2 Tablet(s) Oral at bedtime  sertraline 25 milliGRAM(s) Oral daily  silver sulfADIAZINE 1% Cream 1 Application(s) Topical daily  sodium chloride 0.9%. 1000 milliLiter(s) (60 mL/Hr) IV Continuous <Continuous>    MEDICATIONS  (PRN):  acetaminophen     Tablet .. 650 milliGRAM(s) Oral every 6 hours PRN Severe Pain (7 - 10)  aluminum hydroxide/magnesium hydroxide/simethicone Suspension 30 milliLiter(s) Oral every 6 hours PRN Dyspepsia  dextrose Oral Gel 15 Gram(s) Oral once PRN Blood Glucose LESS THAN 70 milliGRAM(s)/deciliter      Allergies    No Known Allergies    Intolerances      Review of Systems:  Constitutional: No fever  Eyes: No blurry vision  Neuro: No tremors  HEENT: No pain  Cardiovascular: No chest pain, palpitations  Respiratory: No SOB, no cough  GI: No nausea, vomiting, abdominal pain  : No dysuria  Skin: no rash  Psych: no depression  Endocrine: no polyuria, polydipsia  Hem/lymph: no swelling  Osteoporosis: no fractures    ALL OTHER SYSTEMS REVIEWED AND NEGATIVE    PHYSICAL EXAM:  VITALS: T(C): 36.6 (06-21-22 @ 15:01)  T(F): 97.8 (06-21-22 @ 15:01), Max: 97.8 (06-21-22 @ 15:01)  HR: 91 (06-21-22 @ 15:01) (85 - 103)  BP: 148/81 (06-21-22 @ 15:01) (137/67 - 169/79)  RR:  (18 - 19)  SpO2:  (100% - 100%)  Wt(kg): --  GENERAL: NAD, thin elderly appearing male   EYES: No proptosis, no lid lag, anicteric  RESPIRATORY: Clear to auscultation bilaterally; No rales, rhonchi, wheezing, or rubs  CARDIOVASCULAR: Regular rate and rhythm; No murmurs; no peripheral edema  GI: Soft, nontender, non distended, normal bowel sounds      POCT Blood Glucose.: 105 mg/dL (06-21-22 @ 16:17)  POCT Blood Glucose.: 103 mg/dL (06-21-22 @ 15:21)  POCT Blood Glucose.: 132 mg/dL (06-21-22 @ 12:00)  POCT Blood Glucose.: 251 mg/dL (06-21-22 @ 09:35)  POCT Blood Glucose.: 311 mg/dL (06-21-22 @ 08:30)  POCT Blood Glucose.: 38 mg/dL (06-21-22 @ 08:04)  POCT Blood Glucose.: 40 mg/dL (06-21-22 @ 08:03)                            10.8   6.97  )-----------( 296      ( 21 Jun 2022 14:16 )             34.0       06-21    143  |  103  |  17  ----------------------------<  110<H>  3.4<L>   |  32<H>  |  0.47<L>    eGFR: 102    Ca    8.9      06-21  Mg     2.10     06-21  Phos  2.7     06-21    TPro  6.1  /  Alb  2.8<L>  /  TBili  0.3  /  DBili  x   /  AST  23  /  ALT  37  /  AlkPhos  161<H>  06-21      Thyroid Function Tests:              Radiology:

## 2022-06-21 NOTE — H&P ADULT - PROBLEM SELECTOR PLAN 5
- Patient with history of protein-calorie malnutrition  - Albumin: 2.6  - Speech & swallow evaluation - Patient with history of protein-calorie malnutrition  - Albumin 2.6  - Speech & swallow evaluation - Patient with history of T2DM, on Lantus 16 units at bedtime and Humalog sliding scale  - Patient noted to be hypoglycemic to 38 in the ED, given D50 with improvement up to 311  - Hold insulin for now in the setting of hypoglycemia  - Continue monitoring fingersticks  - Check HgbA1c  - Diabetic diet  - Endocrine consult - Patient with history of T2DM, on Lantus 16 units at bedtime and Humalog sliding scale  - Patient noted to be hypoglycemic to 38 in the ED, given D50 with improvement up to 311  - patient seen and examined at that time, found to be complaining of feeling a little shaky and sweaty, but was awake and alert.   - HOLD all insulin for now in the setting of hypoglycemia  - Continue monitoring fingersticks as per protocol   - Check HgbA1c (added on)  - Diabetic diet  - Endocrine consulted for help with management given degree of hypoglycemia

## 2022-06-21 NOTE — H&P ADULT - PROBLEM SELECTOR PLAN 6
- Patient with history of AFib, on Eliquis 2.5 mg PO BID and Diltiazem  mg PO daily  - Patient in NSR on the monitor  - EKG: ************  - Continue home meds - Patient with history of AFib, on Eliquis 2.5 mg PO BID and Diltiazem  mg PO daily  - Patient in sinus tach on the monitor  - No EKG in the chart, will obtain admission EKG  - Continue home meds - Patient with history of AFib, on Eliquis 2.5 mg PO BID and Diltiazem  mg PO daily  - Patient in sinus tach on the monitor  - No EKG in the chart, will obtain admission EKG  - Continue Eliquis and Diltiazem - Patient with history of AFib, on Eliquis 2.5 mg PO BID and Diltiazem  mg PO daily  - HR on exam is regular   - No EKG in the chart, will obtain admission EKG  - Continue Eliquis and Diltiazem at home doses

## 2022-06-21 NOTE — H&P ADULT - GASTROINTESTINAL COMMENTS
benign abdominal exam, no peritoneal signs small amount of blood mixed with brown stool per daugther x 1 yesterday ; happened with straining to have BM

## 2022-06-21 NOTE — H&P ADULT - NEGATIVE OPHTHALMOLOGIC SYMPTOMS
no blurred vision L/no blurred vision R/no loss of vision L/no loss of vision R no diplopia/no blurred vision L/no blurred vision R/no loss of vision L/no loss of vision R

## 2022-06-21 NOTE — H&P ADULT - NSHPLABSRESULTS_GEN_ALL_CORE
10.9   9.13  )-----------( 307      ( 2022 00:08 )             33.1         140  |  102  |  24<H>  ----------------------------<  67<L>  3.6   |  31  |  0.62    Ca    8.9      2022 00:08    TPro  6.0  /  Alb  2.6<L>  /  TBili  0.4  /  DBili  x   /  AST  15  /  ALT  29  /  AlkPhos  136<H>  06-20      Urinalysis Basic - ( 2022 01:35 )    Color: Yellow / Appearance: Slightly Turbid / S.016 / pH: x  Gluc: x / Ketone: Negative  / Bili: Negative / Urobili: 3 mg/dL   Blood: x / Protein: Trace / Nitrite: Negative   Leuk Esterase: Moderate / RBC: 3 /HPF / WBC 13 /HPF   Sq Epi: x / Non Sq Epi: 1 /HPF / Bacteria: Few      CXR: Bilateral diffuse interstitial opacities consistent with  chronic changes from pulmonary fibrosis.      CT A/P with IV contrast: Distended gallbladder with minimal gallbladder wall hyperemia. Correlate  with LFTs. Sonographic evaluation may be considered, as clinically  indicated. Patchy areas of decreased enhancement in both kidneys which raises concern for pyelonephritis. Correlate with urinalysis. Large fecal load in the rectum with mild perirectal fatty infiltration  which raises question of stercoral colitis. Prostatomegaly. Ill-defined patchy groundglass opacities at the lung bases. Small right pleural effusion.    EKG: ************************ 10.9   9.13  )-----------( 307      ( 2022 00:08 )             33.1     -    140  |  102  |  24<H>  ----------------------------<  67<L>  3.6   |  31  |  0.62    Ca    8.9      2022 00:08    TPro  6.0  /  Alb  2.6<L>  /  TBili  0.4  /  DBili  x   /  AST  15  /  ALT  29  /  AlkPhos  136<H>  06-20      Urinalysis Basic - ( 2022 01:35 )    Color: Yellow / Appearance: Slightly Turbid / S.016 / pH: x  Gluc: x / Ketone: Negative  / Bili: Negative / Urobili: 3 mg/dL   Blood: x / Protein: Trace / Nitrite: Negative   Leuk Esterase: Moderate / RBC: 3 /HPF / WBC 13 /HPF   Sq Epi: x / Non Sq Epi: 1 /HPF / Bacteria: Few      CXR: Bilateral diffuse interstitial opacities consistent with  chronic changes from pulmonary fibrosis.      CT A/P with IV contrast: Distended gallbladder with minimal gallbladder wall hyperemia. Correlate  with LFTs. Sonographic evaluation may be considered, as clinically  indicated. Patchy areas of decreased enhancement in both kidneys which raises concern for pyelonephritis. Correlate with urinalysis. Large fecal load in the rectum with mild perirectal fatty infiltration  which raises question of stercoral colitis. Prostatomegaly. Ill-defined patchy groundglass opacities at the lung bases. Small right pleural effusion. 10.9   9.13  )-----------( 307      ( 2022 00:08 )             33.1     06-20    140  |  102  |  24<H>  ----------------------------<  67<L>  3.6   |  31  |  0.62    Ca    8.9      2022 00:08    TPro  6.0  /  Alb  2.6<L>  /  TBili  0.4  /  DBili  x   /  AST  15  /  ALT  29  /  AlkPhos  136<H>  06-20      Urinalysis Basic - ( 2022 01:35 )    Color: Yellow / Appearance: Slightly Turbid / S.016 / pH: x  Gluc: x / Ketone: Negative  / Bili: Negative / Urobili: 3 mg/dL   Blood: x / Protein: Trace / Nitrite: Negative   Leuk Esterase: Moderate / RBC: 3 /HPF / WBC 13 /HPF   Sq Epi: x / Non Sq Epi: 1 /HPF / Bacteria: Few    UCx pending in lab    FS 40->38->311    Lactate 0.6    No EKG in chart    CXR: Bilateral diffuse interstitial opacities consistent with  chronic changes from pulmonary fibrosis.    CT A/P with IV contrast: Distended gallbladder with minimal gallbladder wall hyperemia. Correlate  with LFTs. Sonographic evaluation may be considered, as clinically  indicated. Patchy areas of decreased enhancement in both kidneys which raises concern for pyelonephritis. Correlate with urinalysis. Large fecal load in the rectum with mild perirectal fatty infiltration  which raises question of stercoral colitis. Prostatomegaly. Ill-defined patchy groundglass opacities at the lung bases. Small right pleural effusion.

## 2022-06-21 NOTE — H&P ADULT - PROBLEM SELECTOR PLAN 9
- Patient on Eliquis 2.5 mg PO BID at baseline for chronic AFib  - Continue Eliquis, no need for further anticoagulation - Patient with stage I pressure ulcer to L buttock present on arrival  - Wound team consult - Patient with stage I pressure ulcer to L buttock present on arrival  - Wound team consult  - wound care with silvadene cream continue as ordered in NH

## 2022-06-21 NOTE — BH CONSULTATION LIAISON ASSESSMENT NOTE - NSBHCHARTREVIEWVS_PSY_A_CORE FT
Vital Signs Last 24 Hrs  T(C): 36.6 (21 Jun 2022 15:01), Max: 36.6 (21 Jun 2022 15:01)  T(F): 97.8 (21 Jun 2022 15:01), Max: 97.8 (21 Jun 2022 15:01)  HR: 91 (21 Jun 2022 15:01) (85 - 103)  BP: 148/81 (21 Jun 2022 15:01) (137/67 - 169/79)  BP(mean): --  RR: 18 (21 Jun 2022 15:01) (18 - 19)  SpO2: 100% (21 Jun 2022 15:01) (100% - 100%)

## 2022-06-21 NOTE — H&P ADULT - PROBLEM SELECTOR PLAN 7
- Patient on Eliquis 2.5 mg PO BID at baseline for chronic AFib - Patient on Eliquis 2.5 mg PO BID at baseline for chronic AFib  - Continue Eliquis, no need for further anticoagulation - Patient with history of dysphagia, on ground food and thin liquid diet at the NH  - Puree and thin liquid diet here  - Speech & swallow evaluation  - Aspiration precautions - Patient with history of dysphagia, on ground food and thin liquid diet at the NH  - Patient recently treated for oral thrush at NH, now on prophylactic Fluconazole  - Puree and thin liquid diet here  - Continue Fluconazole  - Speech & swallow evaluation  - Aspiration precautions - Patient with history of dysphagia, on ground texture and thin liquid diet at the NH  - Patient recently treated for oral thrush at NH with nystatin swish and swallow, now on prophylactic Fluconazole  - Puree and thin liquid diet here  - Continue Fluconazole daily ppx   - Speech & swallow evaluation ordered  - Aspiration precautions

## 2022-06-21 NOTE — H&P ADULT - ASSESSMENT
no fever and no chills. Patient is an 86 year old Male with a PMHx of pulmonary fibrosis, T2DM, HTN, AFib (on Eliquis), GERD, dysphagia, and protein-calorie malnutrition, who presented to the ED with abdominal pain. Will be admitted to Medicine for further evaluation and management of abdominal pain and UTI.  Patient is an 86 year old Male with a PMHx of pulmonary fibrosis, T2DM, HTN, AFib (on Eliquis), GERD, dysphagia, and protein-calorie malnutrition, who presented to the ED with abdominal pain. Will be admitted to Medicine for further evaluation and management of abdominal pain and pyelonephritis.  Patient is an 86 year old Male with a PMHx of pulmonary fibrosis, T2DM, HTN, AFib (on Eliquis), GERD, dysphagia (on ground diet and thin liquid diet at NH), protein-calorie malnutrition, and MDD (Q15 minute safety checks at NH for SI), who presented to the ED with abdominal pain. Will be admitted to Medicine for further evaluation and management of abdominal pain and pyelonephritis.  Patient is an 86 year old Male with a PMHx of pulmonary fibrosis on chronic prednisone and home O2 4L NC, T2DM, HTN, AFib (on Eliquis), GERD, dysphagia (on ground diet and thin liquid diet at NH), protein-calorie malnutrition, and MDD (Q15 minute safety checks at NH for SI), hx TB, chronic constipation, recent oral thrush, who presented to the ED with abdominal pain. Patient is an 86 year old Male with a PMHx of pulmonary fibrosis on chronic prednisone and home O2 4L NC, T2DM, HTN, AFib (on Eliquis), GERD, dysphagia (on ground diet and thin liquid diet at NH), protein-calorie malnutrition, and MDD (Q15 minute safety checks at NH for SI), chronic constipation, recent oral thrush, who presented to the ED with abdominal pain.

## 2022-06-21 NOTE — H&P ADULT - PROBLEM SELECTOR PLAN 2
- UA: moderate leuk esterase, 13 WBCs, few bacteria  - CT A/P: Patchy areas of decreased enhancement in both kidneys which raises concern for pyelonephritis.  - Patient afebrile, tachycardic, no leukocytosis  - Patient received and tolerated dose of Ceftriaxone in the ED  - Urine culture sent  - Consider renal US  - Continue Ceftriaxone - UA: moderate leuk esterase, 13 WBCs, few bacteria  - CT A/P: Patchy areas of decreased enhancement in both kidneys which raises concern for pyelonephritis.  - Patient afebrile, tachycardic  - No leukocytosis, + left shift  - Patient received and tolerated 2 g Ceftriaxone in the ED  - Varma catheter placed in the ED  - Urine culture sent, F/U results  - Consider renal US  - Continue Ceftriaxone - UA: moderate leuk esterase, 13 WBCs, few bacteria  - CT A/P: Patchy areas of decreased enhancement in both kidneys which raises concern for pyelonephritis.  - Patient afebrile, tachycardic  - No leukocytosis, + left shift  - Patient received and tolerated 2 g Ceftriaxone in the ED  - Varma catheter placed in the ED  - Urine culture sent, F/U results  - Continue Ceftriaxone  - Low suspicion for active pyelonephritis, given patient recently completed course of Zosyn for PNA - UA: moderate leuk esterase, 13 WBCs, few bacteria  - CT A/P: Patchy areas of decreased enhancement in both kidneys which raises concern for pyelonephritis.  - Patient afebrile, tachycardic  - No leukocytosis, + left shift  - Patient received and tolerated 2 g Ceftriaxone in the ED  - Varma catheter placed in the ED  - Urine culture sent, F/U results  - Continue Ceftriaxone  - Low suspicion for active pyelonephritis as source of symtoms, given patient recently completed course of Zosyn for PNA - UA: moderate leuk esterase, 13 WBCs, few bacteria  - CT A/P: Patchy areas of decreased enhancement in both kidneys which raises concern for pyelonephritis.  - Patient afebrile, no leukocytosis  - Patient received and tolerated 2 g Ceftriaxone in the ED  - Varma catheter placed in the ED  - Urine culture sent, F/U results  - For now continue Ceftriaxone 1gm daily  - Low suspicion for acute pyelonephritis as source of symtoms, given patient recently completed 7d course of Zosyn for presumed PNA per daughter yesterday

## 2022-06-21 NOTE — ED PROVIDER NOTE - CARE PLAN
1 Principal Discharge DX:	Abdominal pain   Principal Discharge DX:	Abdominal pain  Secondary Diagnosis:	Stercoral colitis  Secondary Diagnosis:	Pyelonephritis

## 2022-06-21 NOTE — ED ADULT NURSE NOTE - OBJECTIVE STATEMENT
pt received a&ox 4 with son at bedside, pmh of HTN, BPH, p/w lower abdominal pain. Daughter verbalized patient being given enema at the nursing home fx 2. 1 loose BM noted her in the ED. Pt breathing even and unlabored on room air. NSR on cardiac monitor. Denies fever, chills, cough, SOB, chest pain, palpitations, dizziness, N/V/D, numbness, tingling. 20G IV placed to RAC. Labs collected and sent. EKG in chart. Stretcher in lowest position, wheels locked, appropriate side rails in place, call bell in reach. pt received a&ox 4 with son at bedside, pmh of HTN, BPH, p/w lower abdominal pain. Daughter verbalized patient being given enema at the nursing home fx 2. 1 loose BM noted her in the ED. Pt breathing even and unlabored on room air. NSR on cardiac monitor. Denies fever, chills, cough, SOB, chest pain, palpitations, dizziness, N/V/D, numbness, tingling. Blanchable erythema to sacrum. 20G IV placed to RAC. Labs collected and sent. EKG in chart. Stretcher in lowest position, wheels locked, appropriate side rails in place, call bell in reach.

## 2022-06-21 NOTE — H&P ADULT - GASTROINTESTINAL
soft/nontender/nondistended/normal active bowel sounds/no guarding/no rigidity/no organomegaly/no palpable oumou details…

## 2022-06-21 NOTE — H&P ADULT - PROBLEM SELECTOR PLAN 3
- Patient with history of pulmonary fibrosis, on Atrovent, Acetylcysteine, Incruse Ellipta, and Prednisone  - CXR: Bilateral diffuse interstitial opacities consistent with  chronic changes from pulmonary fibrosis.  - Continue home meds  - Monitor SpO2 and provide supplemental O2 via NC as needed to maintain SpO2 > 92% - Patient with history of pulmonary fibrosis, on Atrovent, Acetylcysteine, Incruse Ellipta, and Prednisone  - CXR: Bilateral diffuse interstitial opacities consistent with chronic changes from pulmonary fibrosis.  - Crackles to B/L lung bases on exam  - Continue home meds  - Monitor SpO2 and provide supplemental O2 via NC as needed to maintain SpO2 > 92% - Patient with history of MDD, on Sertraline 25 mg PO daily, Q15 minute safety checks at NH for SI  - On exam patient endorses feeling depressed, reports prior suicide attempt 1 month ago via hanging, denies current SI or HI  - One-to-one observation  - Continue Sertraline - Patient with history of MDD, on Sertraline 25 mg PO daily, also on Q15 minute safety checks at NH for SI  - On exam patient endorses feeling depressed, reports prior suicide attempt 1 month ago, states "I tried to hang" but is unable to elaborate more, he denies current SI or HI but does endorse being sad   - will place on 1:1 observation for safety  - psych consulted, will follow up recs   - Continue Sertraline 25mg daily - Patient with history of MDD, on Sertraline 25 mg PO daily, also on Q15 minute safety checks at NH for SI  - Interviewed with  # [id number]: patient states he has been "thinking of hanging myself" due to be "overwhelmed with daily life"   - will place on 1:1 observation for safety  - psych consulted, will follow up recs   - Continue Sertraline 25mg daily

## 2022-06-21 NOTE — H&P ADULT - PROBLEM SELECTOR PLAN 1
- Patient presenting with intermittent abdominal pain x 1 month  - CT A/P with IV contrast: Distended gallbladder with minimal gallbladder wall hyperemia. Correlate with LFTs. Sonographic evaluation may be considered, as clinically  indicated. Patchy areas of decreased enhancement in both kidneys which raises concern for pyelonephritis. Correlate with urinalysis. Large fecal load in the rectum with mild perirectal fatty infiltration  which raises question of stercoral colitis. Prostatomegaly. Ill-defined patchy groundglass opacities at the lung bases. Small right pleural effusion.  - Alk phos: 136, AST & ALT WNL  - Consider RUQ US - Patient presenting with intermittent abdominal pain x 1 month  - CT A/P with IV contrast: Distended gallbladder with minimal gallbladder wall hyperemia. Correlate with LFTs. Sonographic evaluation may be considered, as clinically indicated. Patchy areas of decreased enhancement in both kidneys which raises concern for pyelonephritis. Correlate with urinalysis. Large fecal load in the rectum with mild perirectal fatty infiltration which raises question of stercoral colitis. Prostatomegaly. Ill-defined patchy groundglass opacities at the lung bases. Small right pleural effusion.  - Abdomen soft, non-tender, non-distended on exam  - Patient afebrile, tachycardic  - No leukocytosis, + left shift  - Alk phos 136, AST & ALT WNL  - Consider RUQ US - Patient presenting with intermittent abdominal pain x 1 month  - CT A/P with IV contrast: Distended gallbladder with minimal gallbladder wall hyperemia. Correlate with LFTs. Sonographic evaluation may be considered, as clinically indicated. Patchy areas of decreased enhancement in both kidneys which raises concern for pyelonephritis. Correlate with urinalysis. Large fecal load in the rectum with mild perirectal fatty infiltration which raises question of stercoral colitis. Prostatomegaly. Ill-defined patchy groundglass opacities at the lung bases. Small right pleural effusion.  - Abdomen soft, non-tender, non-distended on exam  - Patient afebrile, tachycardic  - No leukocytosis, + left shift  - Alk phos 136, AST & ALT WNL  - Consider RUQ US  - Patient received Lactulose in the ED, continue bowel regimen and consider enema - Patient presenting with intermittent abdominal pain x 1 month  - CT A/P with IV contrast: Distended gallbladder with minimal gallbladder wall hyperemia. Correlate with LFTs. Sonographic evaluation may be considered, as clinically indicated. Patchy areas of decreased enhancement in both kidneys which raises concern for pyelonephritis. Correlate with urinalysis. Large fecal load in the rectum with mild perirectal fatty infiltration which raises question of stercoral colitis. Prostatomegaly. Ill-defined patchy groundglass opacities at the lung bases. Small right pleural effusion.  - Abdomen soft, non-tender, non-distended on exam  - Patient afebrile, tachycardic  - No leukocytosis, + left shift  - Alk phos 136, AST & ALT WNL  - Consider RUQ US  - Patient received Lactulose in the ED, continue bowel regimen and consider enema  - Patients daughter reports noticing a small amount of blood mixed in the patient's stool, likely from straining. No significant bleeding. H&H stable. Will check occult stool. OK to continue Eliquis. - Patient presenting with intermittent abdominal pain x 1 month that acutely worsening over the past 3 days as per daughter; worst when patient is straining to have BM   - CT A/P with IV contrast: Distended gallbladder with minimal gallbladder wall hyperemia. Patchy areas of decreased enhancement in both kidneys which raises concern for pyelonephritis. Large fecal load in the rectum with mild perirectal fatty infiltration which raises question of stercoral colitis.   - Abdomen soft, non-tender, non-distended on exam, there is no peritoneal signs and no RUQ tenderness   - Patient afebrile, tachycardic, no leukocytosis  - Alk phos 136, AST & ALT WNL  - low suspicion for cholecystitis based on clinical exam, can hold off on RUQ sono for now  - symptoms likely related to stercoral colitis found on CT scan given LLQ pain   - Patient received Lactulose in the ED, continue bowel regimen; will increase miralax to BID, cont senna, and add lactulose TID   - Patients daughter reports noticing a small amount of blood mixed in the patient's brown stool, likely from straining. No significant bleeding. H&H stable but no prior to compare to. Will check occult stool. Will continue Eliquis at this time and monitor.

## 2022-06-21 NOTE — H&P ADULT - PROBLEM/PLAN-6
Size Of Lesion In Cm: 1.2 Curettage Text: The wound bed was treated with curettage after the biopsy was performed. Detail Level: Detailed Dressing: bandage X Size Of Lesion In Cm: 0 Electrodesiccation And Curettage Text: The wound bed was treated with electrodesiccation and curettage after the biopsy was performed. Biopsy Type: H and E Bill [id number] For Specimen Handling/Conveyance To Laboratory?: no Anesthesia Volume In Cc (Will Not Render If 0): 0.5 Render Post-Care Instructions In Note?: yes Hemostasis: Electrocautery Depth Of Biopsy: dermis Cryotherapy Text: The wound bed was treated with cryotherapy after the biopsy was performed. Silver Nitrate Text: The wound bed was treated with silver nitrate after the biopsy was performed. Billing Type: [employer] Anesthesia Type: 1% lidocaine with epinephrine Biopsy Method: 15 blade Consent was obtained and risks were reviewed including but not limited to scarring, infection, bleeding, scabbing, incomplete removal, nerve damage and allergy to anesthesia. Information: Selecting Yes will display possible errors in your note based on the variables you have selected. This validation is only offered as a suggestion for you. PLEASE NOTE THAT THE VALIDATION TEXT WILL BE REMOVED WHEN YOU FINALIZE YOUR NOTE. IF YOU WANT TO FAX A PRELIMINARY NOTE YOU WILL NEED TO TOGGLE THIS TO 'NO' IF YOU DO NOT WANT IT IN YOUR FAXED NOTE. Post-care instructions reviewed with the patient in detail. The patient is to keep the biopsy site dry overnight. Remove the bandage and shower as normal with soap and water, dry the area, apply vaseline and a band-aid. Repeat this process daily for five days. Type Of Destruction Used: Curettage Wound Care: Vaseline Notification Instructions: Patient will be notified of biopsy results. However, patient instructed to call the office if not contacted within 2 weeks. Electrodesiccation Text: The wound bed was treated with electrodesiccation after the biopsy was performed. Billing Type: Third-Party Bill DISPLAY PLAN FREE TEXT

## 2022-06-21 NOTE — BH CONSULTATION LIAISON ASSESSMENT NOTE - SUMMARY
Patient is an 86 year old Male with a PMHx of pulmonary fibrosis on chronic prednisone and home O2 4L NC, T2DM, HTN, AFib (on Eliquis), GERD, dysphagia (on ground diet and thin liquid diet at NH), protein-calorie malnutrition, and MDD (Q15 minute safety checks at NH as per records), chronic constipation, recent oral thrush, who presented to the ED with abdominal pain. Psychiatry has been consulted for assessment of mood and safety. Medicine team has patient on 1:1 CO in the ED.     During my evaluation this afternoon, patient denied any si or hi, and endorsed sadness in the context of his declining health, dependence on others, distressing symptoms such as pain, constipation. Later this afternoon though, he reiterated SI when medicine ACP reassessed him.     Recommendations  - Would continue 1:1 CO till re-eval tomm  - Ensure that pain, and constipation is addressed.  - Ensure qtc<500. Continue Zoloft  - Coordinate care with daughter

## 2022-06-21 NOTE — H&P ADULT - NEGATIVE NEUROLOGICAL SYMPTOMS
no weakness/no syncope/no tremors/no vertigo/no headache/no loss of consciousness no weakness/no syncope/no tremors/no vertigo/no loss of sensation/no headache/no loss of consciousness

## 2022-06-21 NOTE — H&P ADULT - NSICDXPASTSURGICALHX_GEN_ALL_CORE_FT
PAST SURGICAL HISTORY:  S/P hernia repair      PAST SURGICAL HISTORY:  H/O cataract extraction     History of prostate surgery prostate was "scraped" 2021    S/P hernia repair

## 2022-06-21 NOTE — H&P ADULT - MENTAL STATUS
A&Ox3 - knows his full name, that he is in the hospital, and that the month is June (does not know the year) A&Ox3 - knows his full name, that he is in the hospital, and that the month is June (does not know the year)  As per daughter he is at baseline mental status but is a bit forgetful in new environments in and out of hospitals

## 2022-06-21 NOTE — H&P ADULT - NS ATTEND AMEND GEN_ALL_CORE FT
This is a Kazakh speaking 86 year old Male with a PMHx of pulmonary fibrosis on chronic prednisone and home O2 4L NC, T2DM, HTN, AFib (on Eliquis), GERD, dysphagia (on ground diet and thin liquid diet at NH), protein-calorie malnutrition, and MDD (Q15 minute safety checks at NH for SI), hx TB, chronic constipation, recent oral thrush, who presented to the ED for intermittent LLQ abdominal pain found to have stercoral colitis on CT A/P and ?pyelonephritis w/ weakly positive UA. On exam he is lethargic but arousable to verbal stimulus, AAO x 2 to name/location (per daughter close to baseline), Lungs w/ bibasilar crackles, Irregularly irregular, no murmurs, +LLQ TTP on deep palpation, neg murphys sign, no LE edema. Vitals and Labs reviewed.    Plan:  #Stercoral colitis  #Pyelonephritis  #Hypoglycemia d/t poor po intake    - per daughter received 2 enema at NH yesterday, unclear if produced an adequate BM. start lactulose TID, miralax BID, senna. If no BM consider adding dulcolax supp  - Pyelonephritis - weakly + UA, start CTX, f/u ucx, if negative can consider discontinuing abx given recent course of zosyn x 7 days  - FS 38-40, improved w/ dextrose IVP. Endo consulted via email. Check A1c. If A1c uncontrolled consider dose reduced lantus w/ ISS  - c/w MIVF  - pain control with tylenol    Remaining mangement as mentioned above  Discussed plan w/ daughter Beti at bedside. This is a [place] speaking 86 year old Male with a PMHx of pulmonary fibrosis on chronic prednisone and home O2 4L NC, T2DM, HTN, AFib (on Eliquis), GERD, dysphagia (on ground diet and thin liquid diet at NH), protein-calorie malnutrition, and MDD (Q15 minute safety checks at NH for SI), hx TB, chronic constipation, recent oral thrush, who presented to the ED for intermittent LLQ abdominal pain found to have stercoral colitis on CT A/P and ?pyelonephritis w/ weakly positive UA. On exam he is lethargic but arousable to verbal stimulus, AAO x 2 to name/location (per daughter close to baseline), Lungs w/ bibasilar crackles, Irregularly irregular, no murmurs, +LLQ TTP on deep palpation, neg murphys sign, no LE edema. Vitals and Labs reviewed.    Plan:  #Stercoral colitis  #Pyelonephritis  #Hypoglycemia d/t poor po intake    - per daughter received 2 enema at NH yesterday, unclear if produced an adequate BM. start lactulose TID, miralax BID, senna. If no BM consider adding dulcolax supp  - Pyelonephritis - weakly + UA, start CTX, f/u ucx, if negative can consider discontinuing abx given recent course of zosyn x 7 days  - FS 38-40, improved w/ dextrose IVP. Endo consulted via email. Check A1c. If A1c uncontrolled consider dose reduced lantus w/ ISS  - c/w MIVF  - pain control with tylenol  - of note CT A/P noted distended gallbladder, RUQ non tender, +isolated alk phos, AST/ALT, T.bili within range, can hold off on RUQ US    Remaining management as mentioned above  Discussed plan w/ daughter Beti at bedside.

## 2022-06-21 NOTE — BH CONSULTATION LIAISON ASSESSMENT NOTE - NSICDXPASTSURGICALHX_GEN_ALL_CORE_FT
PAST SURGICAL HISTORY:  H/O cataract extraction     History of prostate surgery prostate was "scraped" 2021    S/P hernia repair

## 2022-06-21 NOTE — H&P ADULT - PROBLEM SELECTOR PLAN 4
- Patient with history of T2DM, on Lantus 16 units at bedtime and Humalog sliding scale  - Patient noted to be hypoglycemic to 38 in the ED, given D50 with improvement to 311  - Continue monitoring fingersticks  - Check HgbA1c  - Diabetic diet - Patient with history of T2DM, on Lantus 16 units at bedtime and Humalog sliding scale  - Patient noted to be hypoglycemic to 38 in the ED, given D50 with improvement up to 311  - Hold insulin for now in the setting of hypoglycemia  - Continue monitoring fingersticks  - Check HgbA1c  - Diabetic diet - Patient with history of pulmonary fibrosis, on Atrovent, Acetylcysteine, Incruse Ellipta, and Prednisone  - CXR: Bilateral diffuse interstitial opacities consistent with chronic changes from pulmonary fibrosis.  - Crackles to B/L lung bases on exam  - Continue home meds  - Monitor SpO2 and provide supplemental O2 via NC as needed to maintain SpO2 > 92% - Patient with history of pulmonary fibrosis on home O2 4L ATC.  - Also on Atrovent neb q6h ATC, Acetylcysteine 10% via neb q6h, Incruse Ellipta, and Prednisone 30mg daily, as well as fluconazole 100mg daily for presumed candidiasis ppx in setting of steroid use (but unclear in NH paperwork - only listed as prophylaxis).  - CXR: Bilateral diffuse interstitial opacities consistent with chronic changes from pulmonary fibrosis.  - Crackles to B/L lung bases on exam  - Continue home meds  - Monitor SpO2 and provide supplemental O2 via NC as needed to maintain SpO2 > 92%  - will continue with 2L via NC for now as saturation 100%

## 2022-06-21 NOTE — ED ADULT NURSE REASSESSMENT NOTE - NS ED NURSE REASSESS COMMENT FT1
Pt A&O x 3. Noted with low blood sugar. MD Jasmine ACP made aware with telephone order for D50. Endorsed to incoming RN. No changes in mentation noted. Stretcher in lowest position, wheels locked, appropriate side rails in place, call bell in reach.

## 2022-06-21 NOTE — H&P ADULT - RESPIRATORY
no respiratory distress/no use of accessory muscles/airway patent/no intercostal retractions very faint crackles R>L/no wheezes/no rhonchi/no respiratory distress/no use of accessory muscles/airway patent/respirations non-labored/no intercostal retractions/rales

## 2022-06-21 NOTE — BH CONSULTATION LIAISON ASSESSMENT NOTE - CURRENT MEDICATION
MEDICATIONS  (STANDING):  acetylcysteine 10%  Inhalation 2 milliLiter(s) Inhalation every 6 hours  apixaban 2.5 milliGRAM(s) Oral every 12 hours  aspirin  chewable 81 milliGRAM(s) Oral daily  dextrose 5%. 1000 milliLiter(s) (50 mL/Hr) IV Continuous <Continuous>  dextrose 5%. 1000 milliLiter(s) (100 mL/Hr) IV Continuous <Continuous>  dextrose 50% Injectable 25 Gram(s) IV Push once  dextrose 50% Injectable 12.5 Gram(s) IV Push once  dextrose 50% Injectable 25 Gram(s) IV Push once  diltiazem    milliGRAM(s) Oral daily  famotidine    Tablet 20 milliGRAM(s) Oral daily  glucagon  Injectable 1 milliGRAM(s) IntraMuscular once  ipratropium    for Nebulization 500 MICROGram(s) Nebulizer every 6 hours  lactulose Syrup 10 Gram(s) Oral three times a day  polyethylene glycol 3350 17 Gram(s) Oral two times a day  predniSONE   Tablet 30 milliGRAM(s) Oral daily  senna 2 Tablet(s) Oral at bedtime  sertraline 25 milliGRAM(s) Oral daily  silver sulfADIAZINE 1% Cream 1 Application(s) Topical daily  sodium chloride 0.9%. 1000 milliLiter(s) (60 mL/Hr) IV Continuous <Continuous>    MEDICATIONS  (PRN):  acetaminophen     Tablet .. 650 milliGRAM(s) Oral every 6 hours PRN Severe Pain (7 - 10)  aluminum hydroxide/magnesium hydroxide/simethicone Suspension 30 milliLiter(s) Oral every 6 hours PRN Dyspepsia  dextrose Oral Gel 15 Gram(s) Oral once PRN Blood Glucose LESS THAN 70 milliGRAM(s)/deciliter

## 2022-06-21 NOTE — CONSULT NOTE ADULT - ASSESSMENT
Patient is an 86 year old Male with a PMHx of pulmonary fibrosis on chronic prednisone and home O2 4L NC, T2DM, HTN, AFib (on Eliquis), GERD, dysphagia (on ground diet and thin liquid diet at NH), protein-calorie malnutrition, and MDD (Q15 minute safety checks at NH for SI), chronic constipation, recent oral thrush, who presented to the ED with abdominal pain.  found to have hypoglycemia at admission        1. hypoglycemia   pt is Danish speaking and does not take part in meaningful hx with the    pt was recieving insulin at the rehab per the son and has no hx of DM   no current a1c   pt with poor PO intake per the son     would recommend that PO intake be optimized and abdominal pain (constiptation as per the CT scan and per family) and throat pain (??thrush) be addressed as these are contributing facotrs to low po intake  hypoglycemia likely due to poor po intake coupled with what appears to be insulin use at the rehab (per the son pt was recieving insulin)    for now check [facility] premeals and bedtime   optimize PO intake   obtain A1c   check TSH   do not start insulin at this time  allow glucose to stablize   if hyperlgycemia ensues will assess the need for insulin at that time        2. long term use of steroids   please note, pt is on long term steroids -    a. please do not abruptly discontinue prednisone due to risk for adrenal crisis given potential for secondary AI from chronic steroid use   - patient will need HC 50mg IV x 1 dose stat prior to any major surgical procedures   - continue prednisone 5mg daily   - Should pt be unable to tolerate PO and is unable to take hydrocortisone, he will need an emergency injection. Please discharge with a prescription for 100  mg Solu-Cortef Act-O-Vial and the following instructions: [email or web address]      b. long steroid use can lead to osteoporosis   check vitamin D level  outpt DXA         Diamond White MD  Attending Physician   Department of Endocrinology, Diabetes and Metabolism     Pager  [phone] [please provide 10 digit call back number]  LIZBETH 9-5  [email or web address]  Nights and weekends: [phone]  Please note that this patient may be followed by a different provider tomorrow.   If no answer or after hours, please contact [phone].  For final dc reccomendations, please call [phone] or page the endocrine fellow on call.

## 2022-06-21 NOTE — H&P ADULT - NSICDXPASTMEDICALHX_GEN_ALL_CORE_FT
PAST MEDICAL HISTORY:  Chronic atrial fibrillation     DM (diabetes mellitus)     Dysphagia     GERD (gastroesophageal reflux disease)     HTN (hypertension)     Protein calorie malnutrition     Pulmonary fibrosis      PAST MEDICAL HISTORY:  Chronic atrial fibrillation     DM (diabetes mellitus)     Dysphagia     GERD (gastroesophageal reflux disease)     HTN (hypertension)     MDD (major depressive disorder)     Protein calorie malnutrition     Pulmonary fibrosis      PAST MEDICAL HISTORY:  Chronic atrial fibrillation     Constipation     DM (diabetes mellitus)     Dysphagia recent cadida infection, s/p nystatin swish and swallow    GERD (gastroesophageal reflux disease)     HTN (hypertension)     MDD (major depressive disorder)     Protein calorie malnutrition     Pulmonary fibrosis on home O2 and daily prednisone    TB (tuberculosis) history of     PAST MEDICAL HISTORY:  Chronic atrial fibrillation     Constipation     DM (diabetes mellitus)     Dysphagia recent cadida infection, s/p nystatin swish and swallow    GERD (gastroesophageal reflux disease)     HTN (hypertension)     MDD (major depressive disorder)     Protein calorie malnutrition     Pulmonary fibrosis on home O2 and daily prednisone

## 2022-06-21 NOTE — H&P ADULT - PROBLEM SELECTOR PLAN 10
- Patient on Eliquis 2.5 mg PO BID at baseline for chronic AFib  - Continue Eliquis, no need for further anticoagulation - DVT ppx: Patient on Eliquis 2.5 mg PO BID at baseline for chronic AFib so no ppx needed   - Case and plan discussed with Dr. Spears

## 2022-06-21 NOTE — H&P ADULT - HISTORY OF PRESENT ILLNESS
Patient is an 86 year old Male with a PMHx of pulmonary fibrosis, T2DM, HTN, AFib (on Eliquis), GERD, dysphagia, and protein-calorie malnutrition, who presented to the ED with abdominal pain. Patient endorses intermittent, diffuse abdominal pain that has been worsening over the past 1 month. He denies any associated anorexia, nausea, vomiting, diarrhea, constipation, melena, hematochezia, hematemesis, flank pain, dysuria, hematuria, or increased urinary frequency. He reports that the pain is not worsened by eating and that he has been eating/drinking at baseline. States daughter has been giving him Tylenol for the pain, which provides some temporary relief. Denies recent dizziness, lightheadedness, headache, fever, chills, sore throat, cough, SOB, chest pain, palpitations, or leg swelling.   Patient is an 86 year old Male with a PMHx of pulmonary fibrosis, T2DM, HTN, AFib (on Eliquis), GERD, dysphagia (on ground diet and thin liquid diet at NH), protein-calorie malnutrition, and MDD (Q15 minute safety checks at NH for SI), who presented to the ED with abdominal pain. Patient endorses intermittent, diffuse abdominal pain that has been worsening over the past 1 month. He denies any associated anorexia, nausea, vomiting, diarrhea, constipation, melena, hematochezia, hematemesis, flank pain, dysuria, hematuria, or increased urinary frequency. He reports that the pain is not worsened by eating and that he has been eating/drinking at baseline. States daughter has been giving him Tylenol for the pain, which provides some temporary relief. Denies recent dizziness, lightheadedness, headache, fever, chills, sore throat, cough, SOB, chest pain, palpitations, or leg swelling. Patient endorses feeling depressed currently and reports a prior suicide attempt 1 month ago via hanging. Denies current SI or HI.   Patient is an 86 year old Male with a PMHx of pulmonary fibrosis on chronic prednisone and home O2 4L NC, T2DM, HTN, AFib (on Eliquis), GERD, dysphagia (on ground diet and thin liquid diet at NH), protein-calorie malnutrition, and MDD (Q15 minute safety checks at NH for SI), hx TB, chronic constipation, recent oral thrush, who presented to the ED with abdominal pain. Patient endorses intermittent, LLQ and suprapubic abdominal pain that has been intermittent over the last month but acutely worsened over the past 3 days. He feels the urge to move his bowels but gets only minimal relief. The pain was so severe he was red in the face yesterday as per daughter, but has since resolved. At the time of my interview, he was in no pain at all. Of note, he later felt the urge to move his bowels and the pain began again. He denies any associated nausea, vomiting, diarrhea, constipation, melena, hematemesis, flank pain, dysuria, hematuria, or increased urinary frequency. As per his daughter, he has not been eating much due to decreased appetite. She also reported he had 1 brown BM last night that had a small amount of blood mixed in.  States daughter has been giving him Tylenol for the pain, which provides some temporary relief. Denies recent dizziness, lightheadedness, headache, fever, chills, sore throat, cough, SOB, chest pain, palpitations, or leg swelling. Patient endorses feeling depressed currently and reports a prior suicide attempt 1 month ago and reported "I tried to hang." He admits feeling sad now, but denies current SI or HI. In the ED, CT scan was performed which showed distended gallbladder with minimal gallbladder wall hyperemia. Patchy areas of decreased enhancement in both kidneys which raises concern for pyelonephritis. Large fecal load in the rectum with mild perirectal fatty infiltration which raises question of stercoral colitis.Prostatomegaly. He had oneill catheter inserted in ED. He was given a dose of 2gm IV CTX and 1L NS as well as lactulose and IV tylenol. His vital signs were stable. His fingerstick was found to be 38 and he was given D50 with good response in sugar, admitted to feeling a little "shaky" at the time but was awake and alert.    Patient is an 86 year old Male with a PMHx of pulmonary fibrosis on chronic prednisone and home O2 4L NC, T2DM, HTN, AFib (on Eliquis), GERD, dysphagia (on ground diet and thin liquid diet at NH), protein-calorie malnutrition, and MDD (Q15 minute safety checks at NH for SI), chronic constipation, recent oral thrush, who presented to the ED with abdominal pain. Patient endorses intermittent, LLQ and suprapubic abdominal pain that has been intermittent over the last month but acutely worsened over the past 3 days. He feels the urge to move his bowels but gets only minimal relief. The pain was so severe he was red in the face yesterday as per daughter, but has since resolved. At the time of my interview, he was in no pain at all. Of note, he later felt the urge to move his bowels and the pain began again. He denies any associated nausea, vomiting, diarrhea, constipation, melena, hematemesis, flank pain, dysuria, hematuria, or increased urinary frequency. As per his daughter, he has not been eating much due to decreased appetite. She also reported he had 1 brown BM last night that had a small amount of blood mixed in.  States daughter has been giving him Tylenol for the pain, which provides some temporary relief. Denies recent dizziness, lightheadedness, headache, fever, chills, sore throat, cough, SOB, chest pain, palpitations, or leg swelling. Patient endorses feeling depressed currently and reports a prior suicide attempt 1 month ago and reported "I tried to hang." He admits feeling sad now, but denies current SI or HI. In the ED, CT scan was performed which showed distended gallbladder with minimal gallbladder wall hyperemia. Patchy areas of decreased enhancement in both kidneys which raises concern for pyelonephritis. Large fecal load in the rectum with mild perirectal fatty infiltration which raises question of stercoral colitis.Prostatomegaly. He had oneill catheter inserted in ED. He was given a dose of 2gm IV CTX and 1L NS as well as lactulose and IV tylenol. His vital signs were stable. His fingerstick was found to be 38 and he was given D50 with good response in sugar, admitted to feeling a little "shaky" at the time but was awake and alert.

## 2022-06-21 NOTE — ED PROVIDER NOTE - PHYSICAL EXAMINATION
GENERAL: no acute distress, non-toxic appearing  HEAD: normocephalic, atraumatic  HEENT: normal conjunctiva, oral mucosa moist, neck supple  CARDIAC: regular rate and rhythm, normal S1 and S2,  no appreciable murmurs  PULM: clear to ascultation bilaterally, no crackles, rales, rhonchi, or wheezing    GI: abdomen nondistended, soft, nontender, no guarding or rebound tenderness    : no CVA tenderness, no suprapubic tenderness  NEURO: alert and oriented x 2, normal speech, PERRLA, EOMI,   MSK: no visible deformities, no peripheral edema, calf tenderness/redness/swelling  SKIN: no visible rashes, dry, well-perfused  PSYCH: appropriate mood and affect

## 2022-06-21 NOTE — BH CONSULTATION LIAISON ASSESSMENT NOTE - NSICDXPASTMEDICALHX_GEN_ALL_CORE_FT
PAST MEDICAL HISTORY:  Chronic atrial fibrillation     Constipation     DM (diabetes mellitus)     Dysphagia recent cadida infection, s/p nystatin swish and swallow    GERD (gastroesophageal reflux disease)     HTN (hypertension)     MDD (major depressive disorder)     Protein calorie malnutrition     Pulmonary fibrosis on home O2 and daily prednisone

## 2022-06-21 NOTE — H&P ADULT - NEGATIVE GASTROINTESTINAL SYMPTOMS
no nausea/no vomiting/no diarrhea/no constipation/no melena/no hematochezia no nausea/no vomiting/no diarrhea/no constipation/no melena

## 2022-06-21 NOTE — PATIENT PROFILE ADULT - FALL HARM RISK - HARM RISK INTERVENTIONS

## 2022-06-21 NOTE — ED PROVIDER NOTE - ATTENDING CONTRIBUTION TO CARE
HPI: 85 yo M with IDDM, GERD, Pulm fibrosis, history of TB, Dysphagia, chronic constipation, oral thrush, HTN, Afib on eliquis that is coming from [facility] for ab pain. Per pt, he has abd pain, LLQ and suprapubic, not having normal BMs and has decreased appetite due to this pain. Pain is sharp, non radiating, worse after eating. Is still passing gas. No urinary symptoms. Denies fever, chills, N/V/D. Was on Zosyn daily for his unknown infection, last dose was yday.   EXAM: Thin, cachetic, heart tachy, Lungs crackles diffusely, abd tenderness to palpation left lower quadrant and suprapubic with masses, no rebound and no guarding, no masses in , no discharge in penis.   MDM: pt with multiple medical problems that is sent from NH for abd pain and decreased PO. Will obtain labs, imaging, provide IVF for likely dehydration as not tolerating PO and provide pain meds. reassess.

## 2022-06-21 NOTE — ED PROVIDER NOTE - OBJECTIVE STATEMENT
87 yo M with a pmhx of DM2, pulmonary fibrosis on chronic O2 presents to the ED with abdominal pain that started 3 days ago. His pain is over his RLQ, radiate over his lower abdomen, intermittent in nature. No exacerbating or alleviating factors. No N/V/D. When he does have pain, it is severe, no pain at bedside. He denies any CP, SOB, fevers, chills. He denies any sick contacts at facility. He denies any other symptoms at bedside.

## 2022-06-21 NOTE — ED PROVIDER NOTE - CLINICAL SUMMARY MEDICAL DECISION MAKING FREE TEXT BOX
87 yo M with a pmhx of DM2, pulmonary fibrosis on chronic O2 presents to the ED with abdominal pain that started 3 days ago. vitals non actionable at this time. PE as noted above. benign abdomen exam at the time of examination. ddx includes but not limited to: diverticulitis vs colitis vs intussusception vs volvulus. will order labs, imaging, ekg, meds, reassess

## 2022-06-21 NOTE — H&P ADULT - CONVERSATION DETAILS
Given multiple advanced comorbidities, age and recurrent hospitalization, I discussed GOC with daughter and patient at bedside. Patient is AAO x 2 to name, location, not time states its "may" doesn't know year therefore unable to make his own decisions. Per daughter Beti, pt does not have a health care proxy. He has five children 2 brothers and 3 sisters who make collective decisions for him. They are undecided regarding life sustaining measures such as CPR, mechanical intubation, NGT, pressors. During recent hospitalization physicians had discussed GOC and decision was made to keep him full code. She will re-discuss GOC with her siblings and report back to primary team.    Code status: Full Code for now    Time spent 32 mins

## 2022-06-21 NOTE — ED ADULT NURSE REASSESSMENT NOTE - NS ED NURSE REASSESS COMMENT FT1
Pt A&O x 3. 12Fr Indwelling coude catheter inserted as ordered with 200ml output of clear yellow urine. Pt in NAD at this time. Stretcher in lowest position, wheels locked, appropriate side rails in place, call bell in reach.

## 2022-06-21 NOTE — BH CONSULTATION LIAISON ASSESSMENT NOTE - HPI (INCLUDE ILLNESS QUALITY, SEVERITY, DURATION, TIMING, CONTEXT, MODIFYING FACTORS, ASSOCIATED SIGNS AND SYMPTOMS)
Patient is an 86 year old Male with a PMHx of pulmonary fibrosis on chronic prednisone and home O2 4L NC, T2DM, HTN, AFib (on Eliquis), GERD, dysphagia (on ground diet and thin liquid diet at NH), protein-calorie malnutrition, and MDD (Q15 minute safety checks at NH as per records), chronic constipation, recent oral thrush, who presented to the ED with abdominal pain. Psychiatry has been consulted for assessment of mood and safety. Medicine team has patient on 1:1 CO in the ED.   As per records-- In the ED, CT scan was performed which showed distended gallbladder with minimal gallbladder wall hyperemia. Patchy areas of decreased enhancement in both kidneys which raises concern for pyelonephritis. Large fecal load in the rectum with mild perirectal fatty infiltration which raises question of stercoral colitis. Prostatomegaly. He had oneill catheter inserted in ED. He was given a dose of 2gm IV CTX and 1L NS as well as lactulose and IV tylenol. His vital signs were stable. His fingerstick was found to be 38 and he was given D50 with good response.     Met with the patient in the ed. Daughter Mena by bedside ([phone]). Upset that patient was on 1:1 CO for SI. She states that patient has been in rehab for the last 3 weeks, and several days ago was sent to [facility] after staff stated that he had made an SI statement. [facility] did  Patient is an 86 year old Male with a PMHx of pulmonary fibrosis on chronic prednisone and home O2 4L NC, T2DM, HTN, AFib (on Eliquis), GERD, dysphagia (on ground diet and thin liquid diet at NH), protein-calorie malnutrition, and MDD (Q15 minute safety checks at NH as per records), chronic constipation, recent oral thrush, who presented to the ED with abdominal pain. Psychiatry has been consulted for assessment of mood and safety. Medicine team has patient on 1:1 CO in the ED.   As per records-- In the ED, CT scan was performed which showed distended gallbladder with minimal gallbladder wall hyperemia. Patchy areas of decreased enhancement in both kidneys which raises concern for pyelonephritis. Large fecal load in the rectum with mild perirectal fatty infiltration which raises question of stercoral colitis. Prostatomegaly. He had oneill catheter inserted in ED. He was given a dose of 2gm IV CTX and 1L NS as well as lactulose and IV tylenol. His vital signs were stable. His fingerstick was found to be 38 and he was given D50 with good response.     Met with the patient in the ed. Daughter Mena by bedside ([phone]). Upset that patient was on 1:1 CO for SI. She states that patient has been in rehab for the last 3 weeks, and several days ago was sent to [facility] after staff stated that he had made an SI statement. [facility] assessed him and cleared him to go back to rehab. Daughter states that patient does not have a history of mental health issues, has been battling distressing medical issues, pain and she suspects that statements if any was made in that context. She also wonders if the questions were asked in Kyr[place]z because patient only speaks Sudanese.     Interviewed the patient using a . Patient was visibly in distress from abdominal pain at that time. He adamantly denies any si when asked. Denies making a statement in the past either. Admits to sadness when asked, and alluded to pain and inability to 'pass # 2'.     Care coordinated with medicine acp Kenroy who evaluated the patient later this afternoon using a  and patient reiterated that he had SI thoughts. Discussed with acp to continue 1:1 CO till re-eval tomorrow.

## 2022-06-21 NOTE — PATIENT PROFILE ADULT - NSPROGENPREVTRANSF_GEN_A_NUR
Full range of motion of upper and lower extremities, no joint tenderness/swelling.
no history of blood product transfusion

## 2022-06-21 NOTE — H&P ADULT - NSHPSOCIALHISTORY_GEN_ALL_CORE
Resident at [facility] since 6/7/22.  , lives [place] wife in [place].  Denies alcohol, tobacco, or illicit drug use.  Received COVID vaccine (patient unsure name of vaccine brand or date of booster). Resident at [facility] since 6/7/22.  .  Denies alcohol, tobacco, or illicit drug use.  Received COVID vaccine (patient unsure name of vaccine brand or date of booster).

## 2022-06-21 NOTE — H&P ADULT - PROBLEM SELECTOR PLAN 8
- Patient with history of protein-calorie malnutrition  - Albumin 2.6  - Speech & swallow evaluation - Patient with history of protein-calorie malnutrition  - Albumin 2.6  - Speech & swallow evaluation as above  - consider nutrition evaluation

## 2022-06-21 NOTE — H&P ADULT - MUSCULOSKELETAL
no joint swelling/no calf tenderness/no chest wall tenderness details… normal/ROM intact/no joint swelling/no calf tenderness/normal gait/strength 5/5 bilateral upper extremities/strength 5/5 bilateral lower extremities

## 2022-06-22 NOTE — PROGRESS NOTE ADULT - PROBLEM SELECTOR PLAN 6
- Patient with history of AFib, on Eliquis 2.5 mg PO BID and Diltiazem  mg PO daily  - HR on exam is regular   - No EKG in the chart, will obtain admission EKG  - Continue Eliquis and Diltiazem at home doses [ x ] Chronic atrial fibrillation refers to afib that has been present for > 3 months but type of atrial fibrillation is unknown.  [  ] Persistent atrial fibrillation does not terminate within 7 days, or requires repeat pharmacological or electrical conversion  [  ] Longstanding persistent atrial fibrillation is persistent afib lasting more than 12 months  [  ] Permanent atrial fibrillation is persistent or longstanding persistent atrial fibrillation where cardioversion is not indicated, or cannot or will not be performed  [  ] Paroxysmal atrial fibrillation terminates spontaneously or with intervention within 7 days of onset. Episodes may recur with variable frequency.- Patient with history of AFib, on Eliquis 2.5 mg PO BID and Diltiazem  mg PO daily  - HR on exam is regular   - EKG noted- NSR at 82 min   - Continue Eliquis and Diltiazem at home doses

## 2022-06-22 NOTE — PROGRESS NOTE ADULT - PROBLEM SELECTOR PLAN 1
- Patient presenting with intermittent abdominal pain x 1 month that acutely worsening over the past 3 days as per daughter; worst when patient is straining to have BM   - CT A/P with IV contrast: Distended gallbladder with minimal gallbladder wall hyperemia. Patchy areas of decreased enhancement in both kidneys which raises concern for pyelonephritis. Large fecal load in the rectum with mild perirectal fatty infiltration which raises question of stercoral colitis.   - Abdomen soft, non-tender, non-distended on exam, there is no peritoneal signs and no RUQ tenderness   - Patient afebrile, tachycardic, no leukocytosis  - Alk phos 136, AST & ALT WNL  - low suspicion for cholecystitis based on clinical exam, can hold off on RUQ sono for now  - symptoms likely related to stercoral colitis found on CT scan given LLQ pain   - Patient received Lactulose in the ED, continue bowel regimen; will increase miralax to BID, cont senna, and add lactulose TID   - Patients daughter reports noticing a small amount of blood mixed in the patient's brown stool, likely from straining. No significant bleeding. H&H stable but no prior to compare to. Will check occult stool. Will continue Eliquis at this time and monitor. - Patient presenting with intermittent abdominal pain x 1 month that acutely worsening over the past 3 days as per daughter; worst when patient is straining to have BM   - CT A/P with IV contrast: Distended gallbladder with minimal gallbladder wall hyperemia. Patchy areas of decreased enhancement in both kidneys which raises concern for pyelonephritis. Large fecal load in the rectum with mild perirectal fatty infiltration which raises question of stercoral colitis.   - Abdomen soft, non-tender, non-distended on exam, there is no peritoneal signs and no RUQ tenderness   - Patient afebrile, tachycardic, no leukocytosis  - Alk phos 136, AST & ALT WNL  - low suspicion for cholecystitis based on clinical exam, can hold off on RUQ sono for now  - symptoms likely related to stercoral colitis found on CT scan given LLQ pain   - Patient received Lactulose in the ED, continue bowel regimen; with miralax  BID, senna, and  lactulose TID , will give rectal suppository   - Patients daughter reports noticing a small amount of blood mixed in the patient's brown stool, likely from straining. No significant bleeding. H&H stable but no prior to compare to. Will check occult stool. Will continue Eliquis at this time and monitor.

## 2022-06-22 NOTE — PROGRESS NOTE ADULT - PROBLEM SELECTOR PLAN 5
- Patient with history of T2DM, on Lantus 16 units at bedtime and Humalog sliding scale  - Patient noted to be hypoglycemic to 38 in the ED, given D50 with improvement up to 311  - patient seen and examined at that time, found to be complaining of feeling a little shaky and sweaty, but was awake and alert.   - HOLD all insulin for now in the setting of hypoglycemia  - Continue monitoring fingersticks as per protocol   - Check HgbA1c (added on)  - Diabetic diet  - Endocrine consulted for help with management given degree of hypoglycemia - Patient with history of T2DM, on Lantus 16 units at bedtime and Humalog sliding scale  - Patient noted to be hypoglycemic to 38 in the ED, given D50 with improvement up to 311   -HbA1c 6,2   - Endo f/u appreciated, hypoglycemia likely due to poor po intake along with  insulin use at the rehab (  - check [facility] premeals and bedtime   - start low dose admelog scale premeals/before bed as recommended by Endo   - Diabetic diet  -  Hypoglycemia protocol   - encouragement and assistance with feeding

## 2022-06-22 NOTE — PROGRESS NOTE ADULT - PROBLEM SELECTOR PLAN 2
- UA: moderate leuk esterase, 13 WBCs, few bacteria  - CT A/P: Patchy areas of decreased enhancement in both kidneys which raises concern for pyelonephritis.  - Patient afebrile, no leukocytosis  - Patient received and tolerated 2 g Ceftriaxone in the ED  - Varma catheter placed in the ED  - Urine culture sent, F/U results  - For now continue Ceftriaxone 1gm daily  - Low suspicion for acute pyelonephritis as source of symtoms, given patient recently completed 7d course of Zosyn for presumed PNA per daughter yesterday - UA: moderate leuk esterase, 13 WBCs, few bacteria  - CT A/P: Patchy areas of decreased enhancement in both kidneys which raises concern for pyelonephritis.  - Patient afebrile, no leukocytosis  - Patient received and tolerated 2 g Ceftriaxone in the ED  - Varma catheter placed in the ED  - Urine culture sent, F/U results  - For now continue Ceftriaxone 1gm daily  - Low suspicion for acute pyelonephritis as source of symptoms, given patient recently completed 7d course of Zosyn for presumed PNA per daughter yesterday, will deescalate  ABX if cx negative and patient improving

## 2022-06-22 NOTE — CHART NOTE - NSCHARTNOTEFT_GEN_A_CORE
Brief Endo Note  Patient is an 86 year old Male with a PMHx of pulmonary fibrosis on chronic prednisone and home O2 4L NC, T2DM, HTN, AFib (on Eliquis), GERD, dysphagia (on ground diet and thin liquid diet at NH), protein-calorie malnutrition, and MDD (Q15 minute safety checks at NH for SI), chronic constipation, recent oral thrush, who presented to the ED with abdominal pain found to have hypoglycemia at admission    Interval hx:  glucoses trending up today  244 prelunch  Ordered for diet    Plan:  #DM c/b hypoglycemia   pt was recieving insulin at the rehab per the son and has no hx of DM   a1c 6.2  would recommend that PO intake be optimized and abdominal pain (constiptation as per the CT scan and per family) and throat pain (??thrush) be addressed as these are contributing factors to low po intake  hypoglycemia likely due to poor po intake coupled with what appears to be insulin use at the rehab (per the son pt was receiving insulin)  - check [facility] premeals and bedtime   - start low dose admelog scale premeals/before bed (order placed)    Linnea Li MD  Attending Physician   Department of Endocrinology, Diabetes and Metabolism   Pager: [phone]    If before 9AM or after 5PM, or on weekends/holidays, please call the Endocrine answering service for assistance ([phone]).  For nonurgent matters, please email [email or web address] for assistance.

## 2022-06-22 NOTE — PROGRESS NOTE ADULT - PROBLEM SELECTOR PLAN 9
- Patient with stage I pressure ulcer to L buttock present on arrival  - Wound team consult  - wound care with silvadene cream continue as ordered in NH

## 2022-06-22 NOTE — BH CONSULTATION LIAISON PROGRESS NOTE - NSBHADMITCOORDWITH_PSY_A_CORE
How Severe Is Your Skin Lesion?: mild Have Your Skin Lesions Been Treated?: not been treated Is This A New Presentation, Or A Follow-Up?: Skin Lesions medical staff/family/Caregiver

## 2022-06-22 NOTE — PROGRESS NOTE ADULT - PROBLEM SELECTOR PLAN 7
- Patient with history of dysphagia, on ground texture and thin liquid diet at the NH  - Patient recently treated for oral thrush at NH with nystatin swish and swallow, now on prophylactic Fluconazole  - Puree and thin liquid diet here  - Continue Fluconazole daily ppx   - Speech & swallow evaluation ordered  - Aspiration precautions

## 2022-06-22 NOTE — PROGRESS NOTE ADULT - PROBLEM SELECTOR PLAN 10
- DVT ppx: Patient on Eliquis 2.5 mg PO BID at baseline for chronic AFib so no ppx needed   - Case and plan discussed with Dr. Spears - DVT ppx: Patient on Eliquis 2.5 mg PO BID at baseline for chronic AFib so no ppx needed - DVT ppx: Patient on Eliquis 2.5 mg PO BID at baseline for chronic AFib so no ppx needed  PT eval  -plan of care d/w pt and daughter at bedside  case d/w ACp

## 2022-06-22 NOTE — PROGRESS NOTE ADULT - ASSESSMENT
Patient is an 86 year old Male with a PMHx of pulmonary fibrosis on chronic prednisone and home O2 4L NC, T2DM, HTN, AFib (on Eliquis), GERD, dysphagia (on ground diet and thin liquid diet at NH), protein-calorie malnutrition, and MDD (Q15 minute safety checks at NH for SI), chronic constipation, recent oral thrush, who presented to the ED with abdominal pain.

## 2022-06-22 NOTE — PROGRESS NOTE ADULT - PROBLEM SELECTOR PLAN 3
- Patient with history of MDD, on Sertraline 25 mg PO daily, also on Q15 minute safety checks at NH for SI  - Interviewed with  # [id number]: patient states he has been "thinking of hanging myself" due to be "overwhelmed with daily life"   - will place on 1:1 observation for safety  - psych consulted, will follow up recs   - Continue Sertraline 25mg daily - Patient with history of MDD, on Sertraline 25 mg PO daily, also on Q15 minute safety checks at NH for SI  -pt denies SI at this time   -psych eval appreciated   -  no need for 1:1 Observation as per psych   - Continue Sertraline 25mg daily  -Needs further psych safety assessment prior to discharge as per Psychiatry

## 2022-06-22 NOTE — PROGRESS NOTE ADULT - PROBLEM SELECTOR PLAN 8
- Patient with history of protein-calorie malnutrition  - Albumin 2.6  - Speech & swallow evaluation as above  - consider nutrition evaluation - Patient with history of protein-calorie malnutrition  - Albumin 2.6  - Speech & swallow evaluation as above  - nutrition evaluation

## 2022-06-22 NOTE — PROGRESS NOTE ADULT - SUBJECTIVE AND OBJECTIVE BOX
Patient is a 86y old  Male who presents with a chief complaint of abdominal pain (2022 16:44)      SUBJECTIVE / OVERNIGHT EVENTS:    MEDICATIONS  (STANDING):  acetylcysteine 10%  Inhalation 2 milliLiter(s) Inhalation every 6 hours  apixaban 2.5 milliGRAM(s) Oral every 12 hours  aspirin  chewable 81 milliGRAM(s) Oral daily  cefTRIAXone   IVPB 1000 milliGRAM(s) IV Intermittent every 24 hours  dextrose 5%. 1000 milliLiter(s) (50 mL/Hr) IV Continuous <Continuous>  dextrose 5%. 1000 milliLiter(s) (100 mL/Hr) IV Continuous <Continuous>  dextrose 50% Injectable 25 Gram(s) IV Push once  dextrose 50% Injectable 12.5 Gram(s) IV Push once  dextrose 50% Injectable 25 Gram(s) IV Push once  diltiazem    milliGRAM(s) Oral daily  famotidine    Tablet 20 milliGRAM(s) Oral daily  fluconAZOLE   Tablet 100 milliGRAM(s) Oral daily  glucagon  Injectable 1 milliGRAM(s) IntraMuscular once  ipratropium    for Nebulization 500 MICROGram(s) Nebulizer every 6 hours  lactulose Syrup 10 Gram(s) Oral three times a day  polyethylene glycol 3350 17 Gram(s) Oral two times a day  predniSONE   Tablet 30 milliGRAM(s) Oral daily  senna 2 Tablet(s) Oral at bedtime  sertraline 25 milliGRAM(s) Oral daily  silver sulfADIAZINE 1% Cream 1 Application(s) Topical daily  sodium chloride 0.9%. 1000 milliLiter(s) (60 mL/Hr) IV Continuous <Continuous>    MEDICATIONS  (PRN):  acetaminophen     Tablet .. 650 milliGRAM(s) Oral every 6 hours PRN Severe Pain (7 - 10)  aluminum hydroxide/magnesium hydroxide/simethicone Suspension 30 milliLiter(s) Oral every 6 hours PRN Dyspepsia  dextrose Oral Gel 15 Gram(s) Oral once PRN Blood Glucose LESS THAN 70 milliGRAM(s)/deciliter      Vital Signs Last 24 Hrs  T(C): 36.4 (2022 05:42), Max: 36.7 (2022 16:30)  T(F): 97.6 (2022 05:42), Max: 98.1 (2022 16:30)  HR: 66 (2022 05:42) (66 - 93)  BP: 132/63 (2022 05:42) (122/59 - 157/79)  BP(mean): --  RR: 17 (2022 05:42) (17 - 18)  SpO2: 100% (2022 05:42) (96% - 100%)  CAPILLARY BLOOD GLUCOSE      POCT Blood Glucose.: 127 mg/dL (2022 08:22)  POCT Blood Glucose.: 256 mg/dL (2022 22:29)  POCT Blood Glucose.: 105 mg/dL (2022 16:17)  POCT Blood Glucose.: 103 mg/dL (2022 15:21)  POCT Blood Glucose.: 132 mg/dL (2022 12:00)    I&O's Summary    2022 07:01  -  2022 07:00  --------------------------------------------------------  IN: 0 mL / OUT: 900 mL / NET: -900 mL        PHYSICAL EXAM:  GENERAL: NAD, well-developed  HEAD:  Atraumatic, Normocephalic  EYES: EOMI, PERRLA, conjunctiva and sclera clear  NECK: Supple, No JVD  CHEST/LUNG: Clear to auscultation bilaterally; No wheeze  HEART: Regular rate and rhythm; No murmurs, rubs, or gallops  ABDOMEN: Soft, Nontender, Nondistended; Bowel sounds present  EXTREMITIES:  2+ Peripheral Pulses, No clubbing, cyanosis, or edema  PSYCH: AAOx3  NEUROLOGY: non-focal  SKIN: No rashes or lesions    LABS:                        9.9    6.48  )-----------( 282      ( 2022 06:00 )             31.3     06-22    140  |  105  |  17  ----------------------------<  120<H>  3.9   |  29  |  0.52    Ca    8.6      2022 06:00  Phos  2.7     06-21  Mg     2.10     06-21    TPro  5.7<L>  /  Alb  2.4<L>  /  TBili  0.3  /  DBili  x   /  AST  17  /  ALT  31  /  AlkPhos  145<H>  06-22          Urinalysis Basic - ( 2022 01:35 )    Color: Yellow / Appearance: Slightly Turbid / S.016 / pH: x  Gluc: x / Ketone: Negative  / Bili: Negative / Urobili: 3 mg/dL   Blood: x / Protein: Trace / Nitrite: Negative   Leuk Esterase: Moderate / RBC: 3 /HPF / WBC 13 /HPF   Sq Epi: x / Non Sq Epi: 1 /HPF / Bacteria: Few        RADIOLOGY & ADDITIONAL TESTS:    Imaging Personally Reviewed:    Consultant(s) Notes Reviewed:      Care Discussed with Consultants/Other Providers:   Patient is a 86y old  Male who presents with a chief complaint of abdominal pain (2022 16:44)      SUBJECTIVE / OVERNIGHT EVENTS:  patient seen and examined by bedside, pt c/o lower   abdominal pain, spasmodic, , spasms as more far apart  today and less intense as per daughter  denies headache, dizziness, SOB, CP, Palpitations , N/V/D,   pt c/o constipation     MEDICATIONS  (STANDING):  acetylcysteine 10%  Inhalation 2 milliLiter(s) Inhalation every 6 hours  apixaban 2.5 milliGRAM(s) Oral every 12 hours  aspirin  chewable 81 milliGRAM(s) Oral daily  cefTRIAXone   IVPB 1000 milliGRAM(s) IV Intermittent every 24 hours  dextrose 5%. 1000 milliLiter(s) (50 mL/Hr) IV Continuous <Continuous>  dextrose 5%. 1000 milliLiter(s) (100 mL/Hr) IV Continuous <Continuous>  dextrose 50% Injectable 25 Gram(s) IV Push once  dextrose 50% Injectable 12.5 Gram(s) IV Push once  dextrose 50% Injectable 25 Gram(s) IV Push once  diltiazem    milliGRAM(s) Oral daily  famotidine    Tablet 20 milliGRAM(s) Oral daily  fluconAZOLE   Tablet 100 milliGRAM(s) Oral daily  glucagon  Injectable 1 milliGRAM(s) IntraMuscular once  ipratropium    for Nebulization 500 MICROGram(s) Nebulizer every 6 hours  lactulose Syrup 10 Gram(s) Oral three times a day  polyethylene glycol 3350 17 Gram(s) Oral two times a day  predniSONE   Tablet 30 milliGRAM(s) Oral daily  senna 2 Tablet(s) Oral at bedtime  sertraline 25 milliGRAM(s) Oral daily  silver sulfADIAZINE 1% Cream 1 Application(s) Topical daily  sodium chloride 0.9%. 1000 milliLiter(s) (60 mL/Hr) IV Continuous <Continuous>    MEDICATIONS  (PRN):  acetaminophen     Tablet .. 650 milliGRAM(s) Oral every 6 hours PRN Severe Pain (7 - 10)  aluminum hydroxide/magnesium hydroxide/simethicone Suspension 30 milliLiter(s) Oral every 6 hours PRN Dyspepsia  dextrose Oral Gel 15 Gram(s) Oral once PRN Blood Glucose LESS THAN 70 milliGRAM(s)/deciliter      Vital Signs Last 24 Hrs  T(C): 36.4 (2022 05:42), Max: 36.7 (2022 16:30)  T(F): 97.6 (2022 05:42), Max: 98.1 (2022 16:30)  HR: 66 (2022 05:42) (66 - 93)  BP: 132/63 (2022 05:42) (122/59 - 157/79)  BP(mean): --  RR: 17 (2022 05:42) (17 - 18)  SpO2: 100% (2022 05:42) (96% - 100%)  CAPILLARY BLOOD GLUCOSE      POCT Blood Glucose.: 127 mg/dL (2022 08:22)  POCT Blood Glucose.: 256 mg/dL (2022 22:29)  POCT Blood Glucose.: 105 mg/dL (2022 16:17)  POCT Blood Glucose.: 103 mg/dL (2022 15:21)  POCT Blood Glucose.: 132 mg/dL (2022 12:00)    I&O's Summary    2022 07:01  -  2022 07:00  --------------------------------------------------------  IN: 0 mL / OUT: 900 mL / NET: -900 mL        PHYSICAL EXAM:  GENERAL: NAD, frail, with O2 via NC   HEAD:  Atraumatic, Normocephalic  EYES: EOMI, PERRLA, conjunctiva and sclera clear  NECK: Supple, No JVD  CHEST/LUNG:  fine crackles b/l ; No wheeze  HEART: Regular rate and rhythm;   ABDOMEN: Soft, Nontender, Nondistended; Bowel sounds present  : Varma cath in place with clear urine   EXTREMITIES:  2+ Peripheral Pulses, No clubbing, cyanosis, or edema  PSYCH: AAOx3  NEUROLOGY: non-focal  SKIN: stage I pressure ulcer to L buttock  LABS:                        9.9    6.48  )-----------( 282      ( 2022 06:00 )             31.3     06-22    140  |  105  |  17  ----------------------------<  120<H>  3.9   |  29  |  0.52    Ca    8.6      2022 06:00  Phos  2.7     -  Mg     2.10         TPro  5.7<L>  /  Alb  2.4<L>  /  TBili  0.3  /  DBili  x   /  AST  17  /  ALT  31  /  AlkPhos  145<H>            Urinalysis Basic - ( 2022 01:35 )    Color: Yellow / Appearance: Slightly Turbid / S.016 / pH: x  Gluc: x / Ketone: Negative  / Bili: Negative / Urobili: 3 mg/dL   Blood: x / Protein: Trace / Nitrite: Negative   Leuk Esterase: Moderate / RBC: 3 /HPF / WBC 13 /HPF   Sq Epi: x / Non Sq Epi: 1 /HPF / Bacteria: Few        RADIOLOGY & ADDITIONAL TESTS:    Imaging Personally Reviewed:    Consultant(s) Notes Reviewed:  psychiatry, Endo     Care Discussed with Consultants/Other Providers:

## 2022-06-23 NOTE — PROGRESS NOTE ADULT - PROBLEM SELECTOR PLAN 10
- DVT ppx: Patient on Eliquis 2.5 mg PO BID at baseline for chronic AFib so no ppx needed  PT eval  -plan of care d/w pt and daughter at bedside  case d/w ACp

## 2022-06-23 NOTE — PROGRESS NOTE ADULT - PROBLEM SELECTOR PLAN 7
- Patient with history of dysphagia, on ground texture and thin liquid diet at the NH  - Patient recently treated for oral thrush at NH with nystatin swish and swallow, now on prophylactic Fluconazole  - Puree and thin liquid diet here  - Continue Fluconazole daily ppx   - Speech & swallow evaluation ordered  - Aspiration precautions - Patient with history of dysphagia, on ground texture and thin liquid diet at the NH  - Patient recently treated for oral thrush at NH with nystatin swish and swallow, now on prophylactic Fluconazole  - Puree and thin liquid diet here  - Continue Fluconazole daily ppx   - Speech & swallow evaluation appreciated, Minced and moist with mildly thick liquids  - Aspiration precautions

## 2022-06-23 NOTE — PROGRESS NOTE ADULT - SUBJECTIVE AND OBJECTIVE BOX
Chief Complaint: Steroid induced hyperglycemia    History: daughter at bedside, reports he is not feeling well + diarrhea/bleeding  tolerating some po intake  no hypoglycemia    MEDICATIONS  (STANDING):  acetylcysteine 20%  Inhalation 1 milliLiter(s) Inhalation every 6 hours  apixaban 2.5 milliGRAM(s) Oral every 12 hours  aspirin  chewable 81 milliGRAM(s) Oral daily  bisacodyl 5 milliGRAM(s) Oral at bedtime  bisacodyl Suppository 10 milliGRAM(s) Rectal daily  cefTRIAXone   IVPB 1000 milliGRAM(s) IV Intermittent every 24 hours  dextrose 5%. 1000 milliLiter(s) (50 mL/Hr) IV Continuous <Continuous>  dextrose 5%. 1000 milliLiter(s) (100 mL/Hr) IV Continuous <Continuous>  dextrose 50% Injectable 25 Gram(s) IV Push once  dextrose 50% Injectable 12.5 Gram(s) IV Push once  dextrose 50% Injectable 25 Gram(s) IV Push once  diltiazem    milliGRAM(s) Oral daily  famotidine    Tablet 20 milliGRAM(s) Oral daily  fluconAZOLE   Tablet 100 milliGRAM(s) Oral daily  glucagon  Injectable 1 milliGRAM(s) IntraMuscular once  insulin lispro (ADMELOG) corrective regimen sliding scale   SubCutaneous three times a day before meals  insulin lispro (ADMELOG) corrective regimen sliding scale   SubCutaneous at bedtime  ipratropium    for Nebulization 500 MICROGram(s) Nebulizer every 6 hours  polyethylene glycol 3350 17 Gram(s) Oral two times a day  potassium phosphate / sodium phosphate Powder (PHOS-NaK) 1 Packet(s) Oral three times a day with meals  predniSONE   Tablet 30 milliGRAM(s) Oral daily  sertraline 25 milliGRAM(s) Oral daily  silver sulfADIAZINE 1% Cream 1 Application(s) Topical daily  simethicone 80 milliGRAM(s) Chew four times a day  sodium chloride 0.9%. 1000 milliLiter(s) (60 mL/Hr) IV Continuous <Continuous>    MEDICATIONS  (PRN):  acetaminophen     Tablet .. 650 milliGRAM(s) Oral every 6 hours PRN Severe Pain (7 - 10)  aluminum hydroxide/magnesium hydroxide/simethicone Suspension 30 milliLiter(s) Oral every 6 hours PRN Dyspepsia  dextrose Oral Gel 15 Gram(s) Oral once PRN Blood Glucose LESS THAN 70 milliGRAM(s)/deciliter      Allergies    No Known Allergies    Intolerances      Review of Systems:    UNABLE TO OBTAIN    PHYSICAL EXAM:  VITALS: T(C): 36.7 (06-23-22 @ 15:02)  T(F): 98 (06-23-22 @ 15:02), Max: 98.1 (06-22-22 @ 22:11)  HR: 87 (06-23-22 @ 15:02) (66 - 92)  BP: 122/51 (06-23-22 @ 15:02) (114/75 - 142/80)  RR:  (17 - 18)  SpO2:  (95% - 100%)  Wt(kg): --  GENERAL: cahcectic appearing, NAD  EYES: No proptosis, no lid lag, anicteric  HEENT:  Atraumatic, Normocephalic, moist mucous membranes  RESPIRATORY: nasal cannula  PSYCH: Alert and awake    CAPILLARY BLOOD GLUCOSE      POCT Blood Glucose.: 223 mg/dL (23 Jun 2022 12:20)  POCT Blood Glucose.: 181 mg/dL (23 Jun 2022 08:26)  POCT Blood Glucose.: 206 mg/dL (22 Jun 2022 22:04)      06-23    139  |  102  |  14  ----------------------------<  146<H>  3.7   |  29  |  0.50    eGFR: 101    Ca    8.8      06-23  Mg     1.90     06-23  Phos  1.3     06-23    TPro  6.0  /  Alb  2.8<L>  /  TBili  0.3  /  DBili  x   /  AST  15  /  ALT  31  /  AlkPhos  145<H>  06-23      A1C with Estimated Average Glucose Result: 6.2 % (06-22-22 @ 06:00)  A1C with Estimated Average Glucose Result: 6.5 % (06-21-22 @ 14:16)      Thyroid Function Tests:  06-22 @ 06:00 TSH 1.67 FreeT4 -- T3 -- Anti TPO -- Anti Thyroglobulin Ab -- TSI --

## 2022-06-23 NOTE — SWALLOW BEDSIDE ASSESSMENT ADULT - ADDITIONAL RECOMMENDATIONS
1) Monitor for PO tolerance and intake. 2) This service to follow up as schedule permits for dysphagia management. 3) Please reconsult this service as needed.

## 2022-06-23 NOTE — PROGRESS NOTE ADULT - SUBJECTIVE AND OBJECTIVE BOX
Patient is a 86y old  Male who presents with a chief complaint of abdominal pain (22 Jun 2022 09:56)      SUBJECTIVE / OVERNIGHT EVENTS:    MEDICATIONS  (STANDING):  acetylcysteine 20%  Inhalation 1 milliLiter(s) Inhalation every 6 hours  apixaban 2.5 milliGRAM(s) Oral every 12 hours  aspirin  chewable 81 milliGRAM(s) Oral daily  cefTRIAXone   IVPB 1000 milliGRAM(s) IV Intermittent every 24 hours  dextrose 5%. 1000 milliLiter(s) (50 mL/Hr) IV Continuous <Continuous>  dextrose 5%. 1000 milliLiter(s) (100 mL/Hr) IV Continuous <Continuous>  dextrose 50% Injectable 25 Gram(s) IV Push once  dextrose 50% Injectable 12.5 Gram(s) IV Push once  dextrose 50% Injectable 25 Gram(s) IV Push once  diltiazem    milliGRAM(s) Oral daily  famotidine    Tablet 20 milliGRAM(s) Oral daily  fluconAZOLE   Tablet 100 milliGRAM(s) Oral daily  glucagon  Injectable 1 milliGRAM(s) IntraMuscular once  insulin lispro (ADMELOG) corrective regimen sliding scale   SubCutaneous three times a day before meals  insulin lispro (ADMELOG) corrective regimen sliding scale   SubCutaneous at bedtime  ipratropium    for Nebulization 500 MICROGram(s) Nebulizer every 6 hours  lactulose Syrup 10 Gram(s) Oral three times a day  polyethylene glycol 3350 17 Gram(s) Oral two times a day  potassium phosphate / sodium phosphate Powder (PHOS-NaK) 1 Packet(s) Oral three times a day with meals  predniSONE   Tablet 30 milliGRAM(s) Oral daily  senna 2 Tablet(s) Oral at bedtime  sertraline 25 milliGRAM(s) Oral daily  silver sulfADIAZINE 1% Cream 1 Application(s) Topical daily  sodium chloride 0.9%. 1000 milliLiter(s) (60 mL/Hr) IV Continuous <Continuous>    MEDICATIONS  (PRN):  acetaminophen     Tablet .. 650 milliGRAM(s) Oral every 6 hours PRN Severe Pain (7 - 10)  aluminum hydroxide/magnesium hydroxide/simethicone Suspension 30 milliLiter(s) Oral every 6 hours PRN Dyspepsia  dextrose Oral Gel 15 Gram(s) Oral once PRN Blood Glucose LESS THAN 70 milliGRAM(s)/deciliter      Vital Signs Last 24 Hrs  T(C): 36.6 (23 Jun 2022 05:23), Max: 36.7 (22 Jun 2022 16:00)  T(F): 97.8 (23 Jun 2022 05:23), Max: 98.1 (22 Jun 2022 16:00)  HR: 80 (23 Jun 2022 05:23) (66 - 80)  BP: 142/80 (23 Jun 2022 05:23) (122/60 - 142/80)  BP(mean): --  RR: 17 (23 Jun 2022 05:23) (16 - 18)  SpO2: 98% (23 Jun 2022 05:23) (97% - 100%)  CAPILLARY BLOOD GLUCOSE      POCT Blood Glucose.: 181 mg/dL (23 Jun 2022 08:26)  POCT Blood Glucose.: 206 mg/dL (22 Jun 2022 22:04)  POCT Blood Glucose.: 220 mg/dL (22 Jun 2022 17:07)  POCT Blood Glucose.: 244 mg/dL (22 Jun 2022 12:32)    I&O's Summary    22 Jun 2022 07:01  -  23 Jun 2022 07:00  --------------------------------------------------------  IN: 0 mL / OUT: 1600 mL / NET: -1600 mL        PHYSICAL EXAM:  GENERAL: NAD, well-developed  HEAD:  Atraumatic, Normocephalic  EYES: EOMI, PERRLA, conjunctiva and sclera clear  NECK: Supple, No JVD  CHEST/LUNG: Clear to auscultation bilaterally; No wheeze  HEART: Regular rate and rhythm; No murmurs, rubs, or gallops  ABDOMEN: Soft, Nontender, Nondistended; Bowel sounds present  EXTREMITIES:  2+ Peripheral Pulses, No clubbing, cyanosis, or edema  PSYCH: AAOx3  NEUROLOGY: non-focal  SKIN: No rashes or lesions    LABS:                        10.8   9.36  )-----------( 287      ( 23 Jun 2022 05:50 )             33.1     06-23    139  |  102  |  14  ----------------------------<  146<H>  3.7   |  29  |  0.50    Ca    8.8      23 Jun 2022 05:50  Phos  1.3     06-23  Mg     1.90     06-23    TPro  6.0  /  Alb  2.8<L>  /  TBili  0.3  /  DBili  x   /  AST  15  /  ALT  31  /  AlkPhos  145<H>  06-23              RADIOLOGY & ADDITIONAL TESTS:    Imaging Personally Reviewed:    Consultant(s) Notes Reviewed:      Care Discussed with Consultants/Other Providers:   Patient is a 86y old  Male who presents with a chief complaint of abdominal pain (22 Jun 2022 09:56)      SUBJECTIVE / OVERNIGHT EVENTS: patient seen and examined by bedside, pt says abdominal pain has improved, pt had two BMs , denies nausea no acute distress noted vomiting   As per daughter pt having bloody  discharge from his rectum     MEDICATIONS  (STANDING):  acetylcysteine 20%  Inhalation 1 milliLiter(s) Inhalation every 6 hours  apixaban 2.5 milliGRAM(s) Oral every 12 hours  aspirin  chewable 81 milliGRAM(s) Oral daily  cefTRIAXone   IVPB 1000 milliGRAM(s) IV Intermittent every 24 hours  dextrose 5%. 1000 milliLiter(s) (50 mL/Hr) IV Continuous <Continuous>  dextrose 5%. 1000 milliLiter(s) (100 mL/Hr) IV Continuous <Continuous>  dextrose 50% Injectable 25 Gram(s) IV Push once  dextrose 50% Injectable 12.5 Gram(s) IV Push once  dextrose 50% Injectable 25 Gram(s) IV Push once  diltiazem    milliGRAM(s) Oral daily  famotidine    Tablet 20 milliGRAM(s) Oral daily  fluconAZOLE   Tablet 100 milliGRAM(s) Oral daily  glucagon  Injectable 1 milliGRAM(s) IntraMuscular once  insulin lispro (ADMELOG) corrective regimen sliding scale   SubCutaneous three times a day before meals  insulin lispro (ADMELOG) corrective regimen sliding scale   SubCutaneous at bedtime  ipratropium    for Nebulization 500 MICROGram(s) Nebulizer every 6 hours  lactulose Syrup 10 Gram(s) Oral three times a day  polyethylene glycol 3350 17 Gram(s) Oral two times a day  potassium phosphate / sodium phosphate Powder (PHOS-NaK) 1 Packet(s) Oral three times a day with meals  predniSONE   Tablet 30 milliGRAM(s) Oral daily  senna 2 Tablet(s) Oral at bedtime  sertraline 25 milliGRAM(s) Oral daily  silver sulfADIAZINE 1% Cream 1 Application(s) Topical daily  sodium chloride 0.9%. 1000 milliLiter(s) (60 mL/Hr) IV Continuous <Continuous>    MEDICATIONS  (PRN):  acetaminophen     Tablet .. 650 milliGRAM(s) Oral every 6 hours PRN Severe Pain (7 - 10)  aluminum hydroxide/magnesium hydroxide/simethicone Suspension 30 milliLiter(s) Oral every 6 hours PRN Dyspepsia  dextrose Oral Gel 15 Gram(s) Oral once PRN Blood Glucose LESS THAN 70 milliGRAM(s)/deciliter      Vital Signs Last 24 Hrs  T(C): 36.6 (23 Jun 2022 05:23), Max: 36.7 (22 Jun 2022 16:00)  T(F): 97.8 (23 Jun 2022 05:23), Max: 98.1 (22 Jun 2022 16:00)  HR: 80 (23 Jun 2022 05:23) (66 - 80)  BP: 142/80 (23 Jun 2022 05:23) (122/60 - 142/80)  BP(mean): --  RR: 17 (23 Jun 2022 05:23) (16 - 18)  SpO2: 98% (23 Jun 2022 05:23) (97% - 100%)  CAPILLARY BLOOD GLUCOSE      POCT Blood Glucose.: 181 mg/dL (23 Jun 2022 08:26)  POCT Blood Glucose.: 206 mg/dL (22 Jun 2022 22:04)  POCT Blood Glucose.: 220 mg/dL (22 Jun 2022 17:07)  POCT Blood Glucose.: 244 mg/dL (22 Jun 2022 12:32)    I&O's Summary    22 Jun 2022 07:01  -  23 Jun 2022 07:00  --------------------------------------------------------  IN: 0 mL / OUT: 1600 mL / NET: -1600 mL      PHYSICAL EXAM:  GENERAL: NAD, frail, with O2 via NC   HEAD:  Atraumatic, Normocephalic  EYES: EOMI, PERRLA, conjunctiva and sclera clear  CHEST/LUNG:  fine crackles b/l ; No wheeze  HEART: Regular rate and rhythm;   ABDOMEN: Soft, Nontender, Nondistended; Bowel sounds present, bloody discharge on wiping  with tissue , coming from rectum   : Varma cath in place with clear urine   EXTREMITIES:  2+ Peripheral Pulses, No clubbing, cyanosis, or edema  PSYCH: AAOx3  NEUROLOGY: non-focal  SKIN: mild redness in sacrum and buttock     LABS:                        10.8   9.36  )-----------( 287      ( 23 Jun 2022 05:50 )             33.1     06-23    139  |  102  |  14  ----------------------------<  146<H>  3.7   |  29  |  0.50    Ca    8.8      23 Jun 2022 05:50  Phos  1.3     06-23  Mg     1.90     06-23    TPro  6.0  /  Alb  2.8<L>  /  TBili  0.3  /  DBili  x   /  AST  15  /  ALT  31  /  AlkPhos  145<H>  06-23              RADIOLOGY & ADDITIONAL TESTS:    Imaging Personally Reviewed:    Consultant(s) Notes Reviewed:      Care Discussed with Consultants/Other Providers:

## 2022-06-23 NOTE — PROGRESS NOTE ADULT - PROBLEM SELECTOR PLAN 6
[ x ] Chronic atrial fibrillation refers to afib that has been present for > 3 months but type of atrial fibrillation is unknown.  [  ] Persistent atrial fibrillation does not terminate within 7 days, or requires repeat pharmacological or electrical conversion  [  ] Longstanding persistent atrial fibrillation is persistent afib lasting more than 12 months  [  ] Permanent atrial fibrillation is persistent or longstanding persistent atrial fibrillation where cardioversion is not indicated, or cannot or will not be performed  [  ] Paroxysmal atrial fibrillation terminates spontaneously or with intervention within 7 days of onset. Episodes may recur with variable frequency.- Patient with history of AFib, on Eliquis 2.5 mg PO BID and Diltiazem  mg PO daily  - HR on exam is regular   - EKG noted- NSR at 82 min   - Continue Eliquis and Diltiazem at home doses

## 2022-06-23 NOTE — PROGRESS NOTE ADULT - PROBLEM SELECTOR PLAN 9
- Patient with stage I pressure ulcer to L buttock present on arrival  - Wound team consult  - wound care with silvadene cream continue as ordered in NH - Patient with stage I pressure ulcer to L buttock present on arrival  - wound care with silvadene cream continue as ordered in NH  -Skin care as per protocol

## 2022-06-23 NOTE — PROGRESS NOTE ADULT - PROBLEM SELECTOR PLAN 3
- Patient with history of MDD, on Sertraline 25 mg PO daily, also on Q15 minute safety checks at NH for SI  -pt denies SI at this time   -psych eval appreciated   -  no need for 1:1 Observation as per psych   - Continue Sertraline 25mg daily  -Needs further psych safety assessment prior to discharge as per Psychiatry

## 2022-06-23 NOTE — PROGRESS NOTE ADULT - ASSESSMENT
Patient is an 86 year old Male with a PMHx of pulmonary fibrosis on chronic prednisone and home O2 4L NC, T2DM, HTN, AFib (on Eliquis), GERD, dysphagia (on ground diet and thin liquid diet at NH), protein-calorie malnutrition, and MDD (Q15 minute safety checks at NH for SI), chronic constipation, recent oral thrush, who presented to the ED with abdominal pain.  found to have hypoglycemia at admission    1. 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  Start Admelog 1/1/1 units premeal  Will trend  Depending on dc plan for steroid will consider repaglinide 0.5mg premeal TID  Patient's daughter prefers to avoid insulin for dc.    2. long term use of steroids   please note, pt is on long term steroids -  currently on prednisone 30mg daily (same as home dose?)  clarify anticipated steroid plan for dc  Do not abruptly stop steroid risk for secondary/tertiary adrenal insufficiency    3. long steroid use can lead to osteoporosis   check vitamin D level  outpt DXA     Can follow up with PCP or if needed, [facility] endocrinology [phone]    Ruben Srivastava MD  Division of Endocrinology  Pager: [phone]    If after 6PM or before 9AM, or on weekends/holidays, please call endocrine answering service for assistance ([phone]).  For nonurgent matters email [email or web address] for assistance.

## 2022-06-23 NOTE — PROGRESS NOTE ADULT - PROBLEM SELECTOR PLAN 2
- UA: moderate leuk esterase, 13 WBCs, few bacteria  - CT A/P: Patchy areas of decreased enhancement in both kidneys which raises concern for pyelonephritis.  - Patient afebrile, no leukocytosis  - Patient received and tolerated 2 g Ceftriaxone in the ED  - Varma catheter placed in the ED  - Urine culture sent, F/U results  - For now continue Ceftriaxone 1gm daily  - Low suspicion for acute pyelonephritis as source of symptoms, given patient recently completed 7d course of Zosyn for presumed PNA per daughter yesterday, will deescalate  ABX if cx negative and patient improving - UA: moderate leuk esterase, 13 WBCs, few bacteria  - CT A/P: Patchy areas of decreased enhancement in both kidneys which raises concern for pyelonephritis.  - Patient afebrile, no leukocytosis  - Patient received and tolerated 2 g Ceftriaxone in the ED  - Varma catheter placed in the ED, will do TOV tonight   - Urine culture sent, F/U results  - For now continue Ceftriaxone 1gm daily  - Low suspicion for acute pyelonephritis as source of symptoms, given patient recently completed 7d course of Zosyn for presumed PNA per daughter yesterday, will deescalate  ABX if cx negative and patient improving  - if pt with successful TOV, will DC ABx

## 2022-06-23 NOTE — PROGRESS NOTE ADULT - PROBLEM SELECTOR PLAN 8
- Patient with history of protein-calorie malnutrition  - Albumin 2.6  - Speech & swallow evaluation as above  - nutrition evaluation - Patient with history of protein-calorie malnutrition  - Albumin 2.6  - Speech & swallow evaluation  appreciated, recommend Minced and moist with mildly thick liquids   - nutrition evaluation

## 2022-06-23 NOTE — SWALLOW BEDSIDE ASSESSMENT ADULT - ASR SWALLOW RECOMMEND DIAG
Objective testing NOT warranted given patient with overt s/sx of penetration/aspiration for thin liquids. WBC is WFL.

## 2022-06-23 NOTE — PROGRESS NOTE ADULT - PROBLEM SELECTOR PLAN 1
- Patient presenting with intermittent abdominal pain x 1 month that acutely worsening over the past 3 days as per daughter; worst when patient is straining to have BM   - CT A/P with IV contrast: Distended gallbladder with minimal gallbladder wall hyperemia. Patchy areas of decreased enhancement in both kidneys which raises concern for pyelonephritis. Large fecal load in the rectum with mild perirectal fatty infiltration which raises question of stercoral colitis.   - Abdomen soft, non-tender, non-distended on exam, there is no peritoneal signs and no RUQ tenderness   - Patient afebrile, tachycardic, no leukocytosis  - Alk phos 136, AST & ALT WNL  - low suspicion for cholecystitis based on clinical exam, can hold off on RUQ sono for now  - symptoms likely related to stercoral colitis found on CT scan given LLQ pain   - Patient received Lactulose in the ED, continue bowel regimen; with miralax  BID, senna, and  lactulose TID , will give rectal suppository   - Patients daughter reports noticing a small amount of blood mixed in the patient's brown stool, likely from straining. No significant bleeding. H&H stable but no prior to compare to. Will check occult stool. Will continue Eliquis at this time and monitor. - Patient presenting with intermittent abdominal pain x 1 month that acutely worsening over the past 3 days as per daughter; worst when patient is straining to have BM   - CT A/P with IV contrast: Distended gallbladder with minimal gallbladder wall hyperemia. Patchy areas of decreased enhancement in both kidneys which raises concern for pyelonephritis. Large fecal load in the rectum with mild perirectal fatty infiltration which raises question of stercoral colitis.   - Abdomen soft, non-tender, non-distended on exam, there is no peritoneal signs and no RUQ tenderness   - Patient afebrile, tachycardic, no leukocytosis  - Alk phos 136, AST & ALT WNL  - low suspicion for cholecystitis based on clinical exam, can hold off on RUQ sono for now  - symptoms likely related to stercoral colitis found on CT scan given LLQ pain   - Patient received Lactulose in the ED, continue bowel regimen; with miralax  BID, senna, and  lactulose TID , will give rectal suppository   - Patients daughter reports noticing a small amount of blood mixed in the patient's brown stool, likely from straining. No significant bleeding. H&H stable but no prior to compare to.   Will continue Eliquis at this time and monitor.  - will request GI eval to see if pt can benefit from flex sig , to diagnose etiology of bloody discharge

## 2022-06-23 NOTE — PROGRESS NOTE ADULT - PROBLEM SELECTOR PLAN 5
- Patient with history of T2DM, on Lantus 16 units at bedtime and Humalog sliding scale  - Patient noted to be hypoglycemic to 38 in the ED, given D50 with improvement up to 311   -HbA1c 6,2   - Endo f/u appreciated, hypoglycemia likely due to poor po intake along with  insulin use at the rehab (  - check FSBG premeals and bedtime   - start low dose admelog scale premeals/before bed as recommended by Endo   - Diabetic diet  -  Hypoglycemia protocol   - encouragement and assistance with feeding

## 2022-06-23 NOTE — SWALLOW BEDSIDE ASSESSMENT ADULT - COMMENTS
As per Hospitalist note 6/23 "86 year old Male with a PMHx of pulmonary fibrosis on chronic prednisone and home O2 4L NC, T2DM, HTN, AFib (on Eliquis), GERD, dysphagia (on ground diet and thin liquid diet at NH), protein-calorie malnutrition, and MDD (Q15 minute safety checks at NH for SI), chronic constipation, recent oral thrush, who presented to the ED with abdominal pain."    WBC is WFL. CXR 6/21 "Diffuse bilateral patchy and streaky airspace opacities consistent with history of interstitial lung disease (pulmonary fibrosis). No pleural effusion."    Patient seen at bedside, awake/alert, during clinical swallow evaluation this AM. Patient able to follow directions and answer questions. Patient denied difficulties swallowing and/or odynophagia. Patient is receiving supplemental O2 via nasal cannula. Patient was cooperative and accepted PO trials. Following PO trials, patient reported mild abdominal discomfort. RN made aware.

## 2022-06-23 NOTE — SWALLOW BEDSIDE ASSESSMENT ADULT - SWALLOW EVAL: DIAGNOSIS
1) Functional oral stage of swallow for minced and moist, puree, moderately thick fluids, mildly thick fluids, and thin fluids marked by adequate acceptance, bolus manipulation, chewing for minced/moist, and coordination. Anterior to posterior oral transit/transfer noted. Adequate oral clearance noted. 3) Functional pharyngeal stage of swallow for minced and moist, puree, moderately thick fluids, and mildly thick fluids marked by initiation of pharyngeal swallow trigger and hyolaryngeal excursion noted upon digital palpation. No overt signs/symptoms of penetration/aspiration noted. 4) Moderate to Severe pharyngeal dysphagia for thin liquids marked by a suspected delayed pharyngeal swallow trigger with hyolaryngeal elevation noted upon digital palpation. Patient with throat clearing following oral intake suggestive of airway penetration/aspiration.

## 2022-06-24 NOTE — PROGRESS NOTE ADULT - SUBJECTIVE AND OBJECTIVE BOX
Patient is a 86y old  Male who presents with a chief complaint of abdominal pain (23 Jun 2022 17:24)      SUBJECTIVE / OVERNIGHT EVENTS:    MEDICATIONS  (STANDING):  acetylcysteine 20%  Inhalation 1 milliLiter(s) Inhalation every 6 hours  apixaban 2.5 milliGRAM(s) Oral every 12 hours  aspirin  chewable 81 milliGRAM(s) Oral daily  bisacodyl 5 milliGRAM(s) Oral at bedtime  bisacodyl Suppository 10 milliGRAM(s) Rectal daily  cefTRIAXone   IVPB 1000 milliGRAM(s) IV Intermittent every 24 hours  dextrose 5%. 1000 milliLiter(s) (50 mL/Hr) IV Continuous <Continuous>  dextrose 5%. 1000 milliLiter(s) (100 mL/Hr) IV Continuous <Continuous>  dextrose 50% Injectable 25 Gram(s) IV Push once  dextrose 50% Injectable 12.5 Gram(s) IV Push once  dextrose 50% Injectable 25 Gram(s) IV Push once  diltiazem    milliGRAM(s) Oral daily  fluconAZOLE   Tablet 100 milliGRAM(s) Oral daily  glucagon  Injectable 1 milliGRAM(s) IntraMuscular once  hydrocortisone hemorrhoidal Suppository 1 Suppository(s) Rectal two times a day  insulin lispro (ADMELOG) corrective regimen sliding scale   SubCutaneous three times a day before meals  insulin lispro (ADMELOG) corrective regimen sliding scale   SubCutaneous at bedtime  insulin lispro Injectable (ADMELOG) 1 Unit(s) SubCutaneous three times a day before meals  ipratropium    for Nebulization 500 MICROGram(s) Nebulizer every 6 hours  pantoprazole  Injectable 40 milliGRAM(s) IV Push daily  polyethylene glycol 3350 17 Gram(s) Oral two times a day  predniSONE   Tablet 30 milliGRAM(s) Oral daily  sertraline 25 milliGRAM(s) Oral daily  silver sulfADIAZINE 1% Cream 1 Application(s) Topical daily  simethicone 80 milliGRAM(s) Chew four times a day  sodium chloride 0.9%. 1000 milliLiter(s) (60 mL/Hr) IV Continuous <Continuous>    MEDICATIONS  (PRN):  acetaminophen     Tablet .. 650 milliGRAM(s) Oral every 6 hours PRN Severe Pain (7 - 10)  aluminum hydroxide/magnesium hydroxide/simethicone Suspension 30 milliLiter(s) Oral every 6 hours PRN Dyspepsia  dextrose Oral Gel 15 Gram(s) Oral once PRN Blood Glucose LESS THAN 70 milliGRAM(s)/deciliter      Vital Signs Last 24 Hrs  T(C): 37.1 (24 Jun 2022 05:40), Max: 37.1 (24 Jun 2022 01:59)  T(F): 98.8 (24 Jun 2022 05:40), Max: 98.8 (24 Jun 2022 05:40)  HR: 88 (24 Jun 2022 05:40) (76 - 92)  BP: 126/66 (24 Jun 2022 05:40) (113/64 - 126/66)  BP(mean): --  RR: 18 (24 Jun 2022 05:40) (17 - 18)  SpO2: 100% (24 Jun 2022 05:40) (95% - 100%)  CAPILLARY BLOOD GLUCOSE      POCT Blood Glucose.: 143 mg/dL (24 Jun 2022 08:38)  POCT Blood Glucose.: 128 mg/dL (23 Jun 2022 22:24)  POCT Blood Glucose.: 170 mg/dL (23 Jun 2022 17:28)  POCT Blood Glucose.: 223 mg/dL (23 Jun 2022 12:20)    I&O's Summary    23 Jun 2022 07:01  -  24 Jun 2022 07:00  --------------------------------------------------------  IN: 0 mL / OUT: 950 mL / NET: -950 mL        PHYSICAL EXAM:  GENERAL: NAD, well-developed  HEAD:  Atraumatic, Normocephalic  EYES: EOMI, PERRLA, conjunctiva and sclera clear  NECK: Supple, No JVD  CHEST/LUNG: Clear to auscultation bilaterally; No wheeze  HEART: Regular rate and rhythm; No murmurs, rubs, or gallops  ABDOMEN: Soft, Nontender, Nondistended; Bowel sounds present  EXTREMITIES:  2+ Peripheral Pulses, No clubbing, cyanosis, or edema  PSYCH: AAOx3  NEUROLOGY: non-focal  SKIN: No rashes or lesions    LABS:                        11.4   12.52 )-----------( 276      ( 24 Jun 2022 06:05 )             35.7     06-24    135  |  99  |  17  ----------------------------<  147<H>  4.1   |  27  |  0.58    Ca    8.9      24 Jun 2022 06:05  Phos  2.3     06-24  Mg     2.00     06-24    TPro  6.0  /  Alb  2.8<L>  /  TBili  0.3  /  DBili  x   /  AST  15  /  ALT  31  /  AlkPhos  145<H>  06-23              RADIOLOGY & ADDITIONAL TESTS:    Imaging Personally Reviewed:    Consultant(s) Notes Reviewed:      Care Discussed with Consultants/Other Providers:   Patient is a 86y old  Male who presents with a chief complaint of abdominal pain (23 Jun 2022 17:24)      SUBJECTIVE / OVERNIGHT EVENTS: patient seen and examined by bedside, pt was manually disimpacted by GI , large amount of stools removed as per daughter , still reports mild abdominal pain, pt afebrile, TOV in progress     MEDICATIONS  (STANDING):  acetylcysteine 20%  Inhalation 1 milliLiter(s) Inhalation every 6 hours  apixaban 2.5 milliGRAM(s) Oral every 12 hours  aspirin  chewable 81 milliGRAM(s) Oral daily  bisacodyl 5 milliGRAM(s) Oral at bedtime  bisacodyl Suppository 10 milliGRAM(s) Rectal daily  cefTRIAXone   IVPB 1000 milliGRAM(s) IV Intermittent every 24 hours  dextrose 5%. 1000 milliLiter(s) (50 mL/Hr) IV Continuous <Continuous>  dextrose 5%. 1000 milliLiter(s) (100 mL/Hr) IV Continuous <Continuous>  dextrose 50% Injectable 25 Gram(s) IV Push once  dextrose 50% Injectable 12.5 Gram(s) IV Push once  dextrose 50% Injectable 25 Gram(s) IV Push once  diltiazem    milliGRAM(s) Oral daily  fluconAZOLE   Tablet 100 milliGRAM(s) Oral daily  glucagon  Injectable 1 milliGRAM(s) IntraMuscular once  hydrocortisone hemorrhoidal Suppository 1 Suppository(s) Rectal two times a day  insulin lispro (ADMELOG) corrective regimen sliding scale   SubCutaneous three times a day before meals  insulin lispro (ADMELOG) corrective regimen sliding scale   SubCutaneous at bedtime  insulin lispro Injectable (ADMELOG) 1 Unit(s) SubCutaneous three times a day before meals  ipratropium    for Nebulization 500 MICROGram(s) Nebulizer every 6 hours  pantoprazole  Injectable 40 milliGRAM(s) IV Push daily  polyethylene glycol 3350 17 Gram(s) Oral two times a day  predniSONE   Tablet 30 milliGRAM(s) Oral daily  sertraline 25 milliGRAM(s) Oral daily  silver sulfADIAZINE 1% Cream 1 Application(s) Topical daily  simethicone 80 milliGRAM(s) Chew four times a day  sodium chloride 0.9%. 1000 milliLiter(s) (60 mL/Hr) IV Continuous <Continuous>    MEDICATIONS  (PRN):  acetaminophen     Tablet .. 650 milliGRAM(s) Oral every 6 hours PRN Severe Pain (7 - 10)  aluminum hydroxide/magnesium hydroxide/simethicone Suspension 30 milliLiter(s) Oral every 6 hours PRN Dyspepsia  dextrose Oral Gel 15 Gram(s) Oral once PRN Blood Glucose LESS THAN 70 milliGRAM(s)/deciliter      Vital Signs Last 24 Hrs  T(C): 37.1 (24 Jun 2022 05:40), Max: 37.1 (24 Jun 2022 01:59)  T(F): 98.8 (24 Jun 2022 05:40), Max: 98.8 (24 Jun 2022 05:40)  HR: 88 (24 Jun 2022 05:40) (76 - 92)  BP: 126/66 (24 Jun 2022 05:40) (113/64 - 126/66)  BP(mean): --  RR: 18 (24 Jun 2022 05:40) (17 - 18)  SpO2: 100% (24 Jun 2022 05:40) (95% - 100%)  CAPILLARY BLOOD GLUCOSE      POCT Blood Glucose.: 143 mg/dL (24 Jun 2022 08:38)  POCT Blood Glucose.: 128 mg/dL (23 Jun 2022 22:24)  POCT Blood Glucose.: 170 mg/dL (23 Jun 2022 17:28)  POCT Blood Glucose.: 223 mg/dL (23 Jun 2022 12:20)    I&O's Summary    23 Jun 2022 07:01  -  24 Jun 2022 07:00  --------------------------------------------------------  IN: 0 mL / OUT: 950 mL / NET: -950 mL      PHYSICAL EXAM:  GENERAL: NAD, frail, with O2 via NC   HEAD:  Atraumatic, Normocephalic  EYES: EOMI, PERRLA, conjunctiva and sclera clear  CHEST/LUNG:  fine crackles b/l ; No wheeze  HEART: Regular rate and rhythm;   ABDOMEN: Soft, Nontender, Nondistended; Bowel sounds present,   : Varma cath removed   EXTREMITIES:  2+ Peripheral Pulses, No clubbing, cyanosis, or edema  PSYCH: AAOx3  NEUROLOGY: non-focal  SKIN: mild redness in sacrum and buttock           LABS:                        11.4   12.52 )-----------( 276      ( 24 Jun 2022 06:05 )             35.7     06-24    135  |  99  |  17  ----------------------------<  147<H>  4.1   |  27  |  0.58    Ca    8.9      24 Jun 2022 06:05  Phos  2.3     06-24  Mg     2.00     06-24    TPro  6.0  /  Alb  2.8<L>  /  TBili  0.3  /  DBili  x   /  AST  15  /  ALT  31  /  AlkPhos  145<H>  06-23              RADIOLOGY & ADDITIONAL TESTS:    Imaging Personally Reviewed:    Consultant(s) Notes Reviewed:  GI , psychiatry     Care Discussed with Consultants/Other Providers:

## 2022-06-24 NOTE — BH CONSULTATION LIAISON PROGRESS NOTE - NSBHFUPINTERVALCCFT_PSY_A_CORE
On 1:1 co till I completed safety assessment   UTI+  Calm, compliant  No behavioral issues  Care coordinated with medicine acp Monet--- below plan discussed in detail.   chart reviewed, labs reviewed
Has been calm, no behavioral issues. Had BM last night as per medicine note.   No longer on 1:1CO  Care coordinated with medicine team-- aware of plan below  Reviewed chart, and labs

## 2022-06-24 NOTE — BH CONSULTATION LIAISON PROGRESS NOTE - NSBHATTESTBILLINGAW_PSY_A_CORE
[id number] Inpatient care - moderate complexity - 25 minutes
[id number] Inpatient care - moderate complexity - 25 minutes

## 2022-06-24 NOTE — PROGRESS NOTE ADULT - PROBLEM SELECTOR PLAN 10
- DVT ppx: Patient on Eliquis 2.5 mg PO BID at baseline for chronic AFib so no ppx needed  PT eval  -plan of care d/w pt and daughter at bedside  case d/w ACp - DVT ppx: Patient on Eliquis 2.5 mg PO BID at baseline for chronic AFib so no ppx needed  PT eval  -plan of care d/w pt and daughter at bedside  GOC again discussed with daughter, wants to d/w other siblings   case d/w ACp

## 2022-06-24 NOTE — BH CONSULTATION LIAISON PROGRESS NOTE - CURRENT MEDICATION
MEDICATIONS  (STANDING):  acetylcysteine 20%  Inhalation 1 milliLiter(s) Inhalation every 6 hours  apixaban 2.5 milliGRAM(s) Oral every 12 hours  aspirin  chewable 81 milliGRAM(s) Oral daily  bisacodyl 5 milliGRAM(s) Oral at bedtime  bisacodyl Suppository 10 milliGRAM(s) Rectal daily  cefTRIAXone   IVPB 1000 milliGRAM(s) IV Intermittent every 24 hours  dextrose 5%. 1000 milliLiter(s) (50 mL/Hr) IV Continuous <Continuous>  dextrose 5%. 1000 milliLiter(s) (100 mL/Hr) IV Continuous <Continuous>  dextrose 50% Injectable 25 Gram(s) IV Push once  dextrose 50% Injectable 12.5 Gram(s) IV Push once  dextrose 50% Injectable 25 Gram(s) IV Push once  diltiazem    milliGRAM(s) Oral daily  fluconAZOLE   Tablet 100 milliGRAM(s) Oral daily  glucagon  Injectable 1 milliGRAM(s) IntraMuscular once  hydrocortisone hemorrhoidal Suppository 1 Suppository(s) Rectal two times a day  insulin lispro (ADMELOG) corrective regimen sliding scale   SubCutaneous three times a day before meals  insulin lispro (ADMELOG) corrective regimen sliding scale   SubCutaneous at bedtime  insulin lispro Injectable (ADMELOG) 1 Unit(s) SubCutaneous three times a day before meals  ipratropium    for Nebulization 500 MICROGram(s) Nebulizer every 6 hours  pantoprazole  Injectable 40 milliGRAM(s) IV Push daily  polyethylene glycol 3350 17 Gram(s) Oral two times a day  predniSONE   Tablet 30 milliGRAM(s) Oral daily  sertraline 25 milliGRAM(s) Oral daily  silver sulfADIAZINE 1% Cream 1 Application(s) Topical daily  simethicone 80 milliGRAM(s) Chew four times a day  sodium chloride 0.9%. 1000 milliLiter(s) (60 mL/Hr) IV Continuous <Continuous>    MEDICATIONS  (PRN):  acetaminophen     Tablet .. 650 milliGRAM(s) Oral every 6 hours PRN Severe Pain (7 - 10)  aluminum hydroxide/magnesium hydroxide/simethicone Suspension 30 milliLiter(s) Oral every 6 hours PRN Dyspepsia  dextrose Oral Gel 15 Gram(s) Oral once PRN Blood Glucose LESS THAN 70 milliGRAM(s)/deciliter  
MEDICATIONS  (STANDING):  acetylcysteine 10%  Inhalation 2 milliLiter(s) Inhalation every 6 hours  apixaban 2.5 milliGRAM(s) Oral every 12 hours  aspirin  chewable 81 milliGRAM(s) Oral daily  cefTRIAXone   IVPB 1000 milliGRAM(s) IV Intermittent every 24 hours  dextrose 5%. 1000 milliLiter(s) (50 mL/Hr) IV Continuous <Continuous>  dextrose 5%. 1000 milliLiter(s) (100 mL/Hr) IV Continuous <Continuous>  dextrose 50% Injectable 25 Gram(s) IV Push once  dextrose 50% Injectable 12.5 Gram(s) IV Push once  dextrose 50% Injectable 25 Gram(s) IV Push once  diltiazem    milliGRAM(s) Oral daily  famotidine    Tablet 20 milliGRAM(s) Oral daily  fluconAZOLE   Tablet 100 milliGRAM(s) Oral daily  glucagon  Injectable 1 milliGRAM(s) IntraMuscular once  insulin lispro (ADMELOG) corrective regimen sliding scale   SubCutaneous three times a day before meals  insulin lispro (ADMELOG) corrective regimen sliding scale   SubCutaneous at bedtime  ipratropium    for Nebulization 500 MICROGram(s) Nebulizer every 6 hours  lactulose Syrup 10 Gram(s) Oral three times a day  polyethylene glycol 3350 17 Gram(s) Oral two times a day  predniSONE   Tablet 30 milliGRAM(s) Oral daily  senna 2 Tablet(s) Oral at bedtime  sertraline 25 milliGRAM(s) Oral daily  silver sulfADIAZINE 1% Cream 1 Application(s) Topical daily  sodium chloride 0.9%. 1000 milliLiter(s) (60 mL/Hr) IV Continuous <Continuous>    MEDICATIONS  (PRN):  acetaminophen     Tablet .. 650 milliGRAM(s) Oral every 6 hours PRN Severe Pain (7 - 10)  aluminum hydroxide/magnesium hydroxide/simethicone Suspension 30 milliLiter(s) Oral every 6 hours PRN Dyspepsia  dextrose Oral Gel 15 Gram(s) Oral once PRN Blood Glucose LESS THAN 70 milliGRAM(s)/deciliter

## 2022-06-24 NOTE — PROGRESS NOTE ADULT - PROBLEM SELECTOR PLAN 8
- Patient with history of protein-calorie malnutrition  - Albumin 2.6  - Speech & swallow evaluation  appreciated, recommend Minced and moist with mildly thick liquids   - nutrition evaluation

## 2022-06-24 NOTE — PROGRESS NOTE ADULT - PROBLEM SELECTOR PLAN 7
- Patient with history of dysphagia, on ground texture and thin liquid diet at the NH  - Patient recently treated for oral thrush at NH with nystatin swish and swallow, now on prophylactic Fluconazole  - Puree and thin liquid diet here  - Continue Fluconazole daily ppx   - Speech & swallow evaluation appreciated, Minced and moist with mildly thick liquids  - Aspiration precautions

## 2022-06-24 NOTE — BH CONSULTATION LIAISON PROGRESS NOTE - NSBHADMITCOUNSELOTHER_PSY_A_CORE FT
more than 50% of the time was spent in chart review, coordinating with medicine, and coordinating with daughter. Please see above for details. 
more than 50% of time was spent in chart review, review labs, coordinating with medicine, coordinating with daughter, and patient. Please see above for details.

## 2022-06-24 NOTE — BH CONSULTATION LIAISON PROGRESS NOTE - NSBHCHARTREVIEWLAB_PSY_A_CORE FT
CBC Full  -  ( 24 Jun 2022 06:05 )  WBC Count : 12.52 K/uL  RBC Count : 3.78 M/uL  Hemoglobin : 11.4 g/dL  Hematocrit : 35.7 %  Platelet Count - Automated : 276 K/uL  Mean Cell Volume : 94.4 fL  Mean Cell Hemoglobin : 30.2 pg  Mean Cell Hemoglobin Concentration : 31.9 gm/dL  Auto Neutrophil # : x  Auto Lymphocyte # : x  Auto Monocyte # : x  Auto Eosinophil # : x  Auto Basophil # : x  Auto Neutrophil % : x  Auto Lymphocyte % : x  Auto Monocyte % : x  Auto Eosinophil % : x  Auto Basophil % : x  06-24    135  |  99  |  17  ----------------------------<  147<H>  4.1   |  27  |  0.58    Ca    8.9      24 Jun 2022 06:05  Phos  2.3     06-24  Mg     2.00     06-24    TPro  6.0  /  Alb  2.8<L>  /  TBili  0.3  /  DBili  x   /  AST  15  /  ALT  31  /  AlkPhos  145<H>  06-23  
CBC Full  -  ( 22 Jun 2022 06:00 )  WBC Count : 6.48 K/uL  RBC Count : 3.31 M/uL  Hemoglobin : 9.9 g/dL  Hematocrit : 31.3 %  Platelet Count - Automated : 282 K/uL  Mean Cell Volume : 94.6 fL  Mean Cell Hemoglobin : 29.9 pg  Mean Cell Hemoglobin Concentration : 31.6 gm/dL  Auto Neutrophil # : 4.70 K/uL  Auto Lymphocyte # : 0.91 K/uL  Auto Monocyte # : 0.72 K/uL  Auto Eosinophil # : 0.04 K/uL  Auto Basophil # : 0.01 K/uL  Auto Neutrophil % : 72.6 %  Auto Lymphocyte % : 14.0 %  Auto Monocyte % : 11.1 %  Auto Eosinophil % : 0.6 %  Auto Basophil % : 0.2 %  06-22    140  |  105  |  17  ----------------------------<  120<H>  3.9   |  29  |  0.52    Ca    8.6      22 Jun 2022 06:00  Phos  2.7     06-21  Mg     2.10     06-21    TPro  5.7<L>  /  Alb  2.4<L>  /  TBili  0.3  /  DBili  x   /  AST  17  /  ALT  31  /  AlkPhos  145<H>  06-22

## 2022-06-24 NOTE — PROGRESS NOTE ADULT - SUBJECTIVE AND OBJECTIVE BOX
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 Pt was sleeping at time of visit. Daughter at bedside Beti [phone]. Pt with fair appetite. As per daughter and Rn pt has no signs of nausea and vomiting/hypoglycemia    MEDICATIONS  (STANDING):  acetylcysteine 20%  Inhalation 1 milliLiter(s) Inhalation every 6 hours  apixaban 2.5 milliGRAM(s) Oral every 12 hours  aspirin  chewable 81 milliGRAM(s) Oral daily  bisacodyl 5 milliGRAM(s) Oral at bedtime  bisacodyl Suppository 10 milliGRAM(s) Rectal daily  cefTRIAXone   IVPB 1000 milliGRAM(s) IV Intermittent every 24 hours  dextrose 5%. 1000 milliLiter(s) (50 mL/Hr) IV Continuous <Continuous>  dextrose 5%. 1000 milliLiter(s) (100 mL/Hr) IV Continuous <Continuous>  dextrose 50% Injectable 25 Gram(s) IV Push once  dextrose 50% Injectable 12.5 Gram(s) IV Push once  dextrose 50% Injectable 25 Gram(s) IV Push once  diltiazem    milliGRAM(s) Oral daily  fluconAZOLE   Tablet 100 milliGRAM(s) Oral daily  glucagon  Injectable 1 milliGRAM(s) IntraMuscular once  hydrocortisone hemorrhoidal Suppository 1 Suppository(s) Rectal two times a day  insulin lispro (ADMELOG) corrective regimen sliding scale   SubCutaneous three times a day before meals  insulin lispro (ADMELOG) corrective regimen sliding scale   SubCutaneous at bedtime  insulin lispro Injectable (ADMELOG) 1 Unit(s) SubCutaneous three times a day before meals  ipratropium    for Nebulization 500 MICROGram(s) Nebulizer every 6 hours  pantoprazole  Injectable 40 milliGRAM(s) IV Push daily  polyethylene glycol 3350 17 Gram(s) Oral two times a day  predniSONE   Tablet 30 milliGRAM(s) Oral daily  sertraline 25 milliGRAM(s) Oral daily  silver sulfADIAZINE 1% Cream 1 Application(s) Topical daily  simethicone 80 milliGRAM(s) Chew four times a day  sodium chloride 0.9%. 1000 milliLiter(s) (60 mL/Hr) IV Continuous <Continuous>    MEDICATIONS  (PRN):  acetaminophen     Tablet .. 650 milliGRAM(s) Oral every 6 hours PRN Severe Pain (7 - 10)  aluminum hydroxide/magnesium hydroxide/simethicone Suspension 30 milliLiter(s) Oral every 6 hours PRN Dyspepsia  dextrose Oral Gel 15 Gram(s) Oral once PRN Blood Glucose LESS THAN 70 milliGRAM(s)/deciliter      Allergies: No Known Allergies    Review of Systems:  UNABLE TO OBTAIN    PHYSICAL EXAM:  VITALS: T(C): 36.7 (06-24-22 @ 11:00)  T(F): 98.1 (06-24-22 @ 11:00), Max: 98.8 (06-24-22 @ 05:40)  HR: 77 (06-24-22 @ 11:00) (76 - 88)  BP: 127/75 (06-24-22 @ 11:00) (113/64 - 127/75)  RR:  (17 - 18)  SpO2:  (97% - 100%)  Wt(kg): --  GENERAL: NAD  RESPIRATORY: On Oxygen; no labored breathing   GI: Soft, nontender, non distended      CAPILLARY BLOOD GLUCOSE  POCT Blood Glucose.: 316 mg/dL (24 Jun 2022 12:12)  POCT Blood Glucose.: 143 mg/dL (24 Jun 2022 08:38)  POCT Blood Glucose.: 128 mg/dL (23 Jun 2022 22:24)  POCT Blood Glucose.: 170 mg/dL (23 Jun 2022 17:28)    A1C with Estimated Average Glucose in AM (06.22.22 @ 06:00)    A1C with Estimated Average Glucose Result: 6.2      06-24    135  |  99  |  17  ----------------------------<  147<H>  4.1   |  27  |  0.58    eGFR: 96    Ca    8.9      06-24  Mg     2.00     06-24  Phos  2.3     06-24    TPro  6.0  /  Alb  2.8<L>  /  TBili  0.3  /  DBili  x   /  AST  15  /  ALT  31  /  AlkPhos  145<H>  06-23      Thyroid Function Tests:  06-22 @ 06:00 TSH 1.67 FreeT4 -- T3 -- Anti TPO -- Anti Thyroglobulin Ab -- TSI --      Diet, Minced and Moist:   Consistent Carbohydrate Evening Snack (CSTCHOSN)  Mildly Thick Liquids (MILDTHICKLIQS)  Supplement Feeding Modality:  Oral  Glucerna Shake Cans or Servings Per Day:  1       Frequency:  Three Times a day (06-24-22 @ 14:00) [Active]

## 2022-06-24 NOTE — BH CONSULTATION LIAISON PROGRESS NOTE - NSBHADMITCOUNSEL_PSY_A_CORE
risks and benefits of treatment options/instructions for management, treatment and follow up/risk factor reduction/client/family/caregiver education/other...
risks and benefits of treatment options/instructions for management, treatment and follow up/risk factor reduction/client/family/caregiver education/other...

## 2022-06-24 NOTE — PROGRESS NOTE ADULT - PROBLEM SELECTOR PLAN 2
- UA: moderate leuk esterase, 13 WBCs, few bacteria  - CT A/P: Patchy areas of decreased enhancement in both kidneys which raises concern for pyelonephritis.  - Patient afebrile, no leukocytosis  - Patient received and tolerated 2 g Ceftriaxone in the ED  - Varma catheter placed in the ED, will do TOV tonight   - Urine culture sent, F/U results  - For now continue Ceftriaxone 1gm daily  - Low suspicion for acute pyelonephritis as source of symptoms, given patient recently completed 7d course of Zosyn for presumed PNA per daughter yesterday, will deescalate  ABX if cx negative and patient improving  - if pt with successful TOV, will DC ABx - UA: moderate leuk esterase, 13 WBCs, few bacteria  - CT A/P: Patchy areas of decreased enhancement in both kidneys which raises concern for pyelonephritis.  - Patient afebrile, no leukocytosis  - Patient received and tolerated 2 g Ceftriaxone in the ED  - Varma catheter placed in the ED, TOV in Progress   - Urine culture sent, F/U results  - For now continue Ceftriaxone 1gm daily  - Low suspicion for acute pyelonephritis as source of symptoms, given patient recently completed 7d course of Zosyn for presumed PNA per daughter yesterday, will deescalate  ABX if cx negative and patient improving  - if pt with successful TOV, will DC ABx

## 2022-06-24 NOTE — PROGRESS NOTE ADULT - PROBLEM SELECTOR PLAN 3
- Patient with history of MDD, on Sertraline 25 mg PO daily, also on Q15 minute safety checks at NH for SI  -pt denies SI at this time   -psych eval appreciated   -  no need for 1:1 Observation as per psych   - Continue Sertraline 25mg daily  -Needs further psych safety assessment prior to discharge as per Psychiatry - Patient with history of MDD, on Sertraline 25 mg PO daily, also on Q15 minute safety checks at NH for SI  -pt denies SI at this time   -psych eval appreciated   -  no need for 1:1 Observation as per psych   - Continue Sertraline 25mg daily  -No contraindication to discharge as per Pscyh

## 2022-06-24 NOTE — PROGRESS NOTE ADULT - PROBLEM SELECTOR PLAN 1
- Patient presenting with intermittent abdominal pain x 1 month that acutely worsening over the past 3 days as per daughter; worst when patient is straining to have BM   - CT A/P with IV contrast: Distended gallbladder with minimal gallbladder wall hyperemia. Patchy areas of decreased enhancement in both kidneys which raises concern for pyelonephritis. Large fecal load in the rectum with mild perirectal fatty infiltration which raises question of stercoral colitis.   - Abdomen soft, non-tender, non-distended on exam, there is no peritoneal signs and no RUQ tenderness   - Patient afebrile, tachycardic, no leukocytosis  - Alk phos 136, AST & ALT WNL  - low suspicion for cholecystitis based on clinical exam, can hold off on RUQ sono for now  - symptoms likely related to stercoral colitis found on CT scan given LLQ pain   - Patient received Lactulose in the ED, continue bowel regimen; with miralax  BID, senna, and  lactulose TID , will give rectal suppository   - Patients daughter reports noticing a small amount of blood mixed in the patient's brown stool, likely from straining. No significant bleeding. H&H stable but no prior to compare to.   Will continue Eliquis at this time and monitor.  - will request GI eval to see if pt can benefit from flex sig , to diagnose etiology of bloody discharge - Patient presenting with intermittent abdominal pain x 1 month that acutely worsening over the past 3 days as per daughter; worst when patient is straining to have BM   - CT A/P with IV contrast: Distended gallbladder with minimal gallbladder wall hyperemia. Patchy areas of decreased enhancement in both kidneys which raises concern for pyelonephritis. Large fecal load in the rectum with mild perirectal fatty infiltration which raises question of stercoral colitis.   - Abdomen soft, non-tender, non-distended on exam, there is no peritoneal signs and no RUQ tenderness   - Patient afebrile, now with mild leukocytosis, likely reactive to stercoral colitis   - Alk phos 136, AST & ALT WNL  - low suspicion for cholecystitis based on clinical exam, can hold off on RUQ sono for now  - symptoms likely related to stercoral colitis found on CT scan given LLQ pain   - pt s/p Manual disimpaction by GI team  , GI recommend -miralax TID, senna 17.2 QHS, BID SMOG enemas as needed  - Patients daughter reports noticing a small amount of blood mixed in the patient's brown stool, likely from straining. No significant bleeding. H&H stable   Will continue Eliquis at this time and monitor.

## 2022-06-24 NOTE — CONSULT NOTE ADULT - SUBJECTIVE AND OBJECTIVE BOX
Chief Complaint:  Patient is a 86y old  Male who presents with a chief complaint of Abdominal pain     (24 Jun 2022 12:13)      HPI:    Otherwise, patient denies fevers, chills, weight loss, dysphagia, odynophagia, early satiety, poor oral intake, abdominal pain, nausea, vomiting, diarrhea, melena, hematemesis, hematochezia, change in stool caliber, or family history of GI-related cancers.    Allergies:  No Known Allergies      Home Medications:    Hospital Medications:  acetaminophen     Tablet .. 650 milliGRAM(s) Oral every 6 hours PRN  acetylcysteine 20%  Inhalation 1 milliLiter(s) Inhalation every 6 hours  aluminum hydroxide/magnesium hydroxide/simethicone Suspension 30 milliLiter(s) Oral every 6 hours PRN  apixaban 2.5 milliGRAM(s) Oral every 12 hours  aspirin  chewable 81 milliGRAM(s) Oral daily  bisacodyl 5 milliGRAM(s) Oral at bedtime  bisacodyl Suppository 10 milliGRAM(s) Rectal daily  cefTRIAXone   IVPB 1000 milliGRAM(s) IV Intermittent every 24 hours  dextrose 5%. 1000 milliLiter(s) IV Continuous <Continuous>  dextrose 5%. 1000 milliLiter(s) IV Continuous <Continuous>  dextrose 50% Injectable 25 Gram(s) IV Push once  dextrose 50% Injectable 12.5 Gram(s) IV Push once  dextrose 50% Injectable 25 Gram(s) IV Push once  dextrose Oral Gel 15 Gram(s) Oral once PRN  diltiazem    milliGRAM(s) Oral daily  fluconAZOLE   Tablet 100 milliGRAM(s) Oral daily  glucagon  Injectable 1 milliGRAM(s) IntraMuscular once  hydrocortisone hemorrhoidal Suppository 1 Suppository(s) Rectal two times a day  insulin lispro (ADMELOG) corrective regimen sliding scale   SubCutaneous three times a day before meals  insulin lispro (ADMELOG) corrective regimen sliding scale   SubCutaneous at bedtime  insulin lispro Injectable (ADMELOG) 1 Unit(s) SubCutaneous three times a day before meals  ipratropium    for Nebulization 500 MICROGram(s) Nebulizer every 6 hours  pantoprazole  Injectable 40 milliGRAM(s) IV Push daily  polyethylene glycol 3350 17 Gram(s) Oral two times a day  predniSONE   Tablet 30 milliGRAM(s) Oral daily  sertraline 25 milliGRAM(s) Oral daily  silver sulfADIAZINE 1% Cream 1 Application(s) Topical daily  simethicone 80 milliGRAM(s) Chew four times a day  sodium chloride 0.9%. 1000 milliLiter(s) IV Continuous <Continuous>      PMHX/PSHX:  DM (diabetes mellitus)    H/O pulmonary fibrosis    Pulmonary fibrosis    Pulmonary fibrosis    HTN (hypertension)    Chronic atrial fibrillation    Dysphagia    GERD (gastroesophageal reflux disease)    Protein calorie malnutrition    MDD (major depressive disorder)    TB (tuberculosis)    Constipation    S/P hernia repair    H/O cataract extraction    History of prostate surgery        Family history:  No pertinent family history in first degree relatives     Denies any family history of GI-related disease or cancers.    Social History:   ETOH: per daughter +history of heavy ETOH use  Tobacco: denies  Illicit drug use: denies    ROS: 14 point ROS negative unless otherwise stated in HPI      Vital Signs:  Vital Signs Last 24 Hrs  T(C): 36.7 (24 Jun 2022 11:00), Max: 37.1 (24 Jun 2022 01:59)  T(F): 98.1 (24 Jun 2022 11:00), Max: 98.8 (24 Jun 2022 05:40)  HR: 77 (24 Jun 2022 11:00) (76 - 88)  BP: 127/75 (24 Jun 2022 11:00) (113/64 - 127/75)  BP(mean): --  RR: 18 (24 Jun 2022 11:00) (17 - 18)  SpO2: 100% (24 Jun 2022 11:00) (97% - 100%)  Daily     Daily     PHYSICAL EXAM:     GENERAL:  Appears stated age, thin, chronically ill appearing, no distress  HEENT:  NC/AT,  conjunctivae clear and pink; +MM dry  CHEST:  Full & symmetric excursion, no increased effort, +NC; +fine crackles  HEART:  Regular rhythm, S1, S2, no murmur/rub/S3/S4  ABDOMEN:  Soft, +diffusely tender, non-distended, +bowel sounds  RECTAL:  +blood around brown stool; +moderate amount of stool in rectal vault, manually disimpacted at bedside  EXTREMITIES:  no cyanosis, clubbing or edema  SKIN:  No rash/erythema/ecchymoses/petechiae/wounds/abscess/warm/dry  NEURO:  Alert, oriented      LABS:                        11.4   12.52 )-----------( 276      ( 24 Jun 2022 06:05 )             35.7     06-24    135  |  99  |  17  ----------------------------<  147<H>  4.1   |  27  |  0.58    Ca    8.9      24 Jun 2022 06:05  Phos  2.3     06-24  Mg     2.00     06-24    TPro  6.0  /  Alb  2.8<L>  /  TBili  0.3  /  DBili  x   /  AST  15  /  ALT  31  /  AlkPhos  145<H>  06-23    LIVER FUNCTIONS - ( 23 Jun 2022 05:50 )  Alb: 2.8 g/dL / Pro: 6.0 g/dL / ALK PHOS: 145 U/L / ALT: 31 U/L / AST: 15 U/L / GGT: x                   Imaging:       ACC: [id number] EXAM:  CT ABDOMEN AND PELVIS IC                          PROCEDURE DATE:  06/21/2022          INTERPRETATION:  CLINICAL INFORMATION: Acute abdominal pain    COMPARISON: None.    CONTRAST/COMPLICATIONS:  IV Contrast: Omnipaque 350  60 cc administered  Oral Contrast: NONE  Complications: None reported at time of study completion    PROCEDURE:  CT of the Abdomen and Pelvis was performed.  Sagittal and coronal reformats were performed.    FINDINGS:  LOWER CHEST: Patchy groundglass opacities at the lung bases. Mild   bronchiectasis. Calcified granuloma in the right lower lobe. Small right   pleural effusion.    LIVER: Subcentimeter hypoattenuating hepatic focus too small to   characterize.  BILE DUCTS: Normal caliber.  GALLBLADDER:Distended gallbladder with minimal gallbladder wall   hyperemia. No cholelithiasis or pericholecystic inflammatory changes.  SPLEEN: Within normal limits.  PANCREAS: Within normal limits.  ADRENALS: Within normal limits.  KIDNEYS/URETERS: Patchy areas of decreased enhancement in both kidneys..   Nonobstructing left renal stones measuring up to 3 mm. No hydronephrosis.    BLADDER: Within normal limits.  REPRODUCTIVE ORGANS: Prostate gland is enlarged with median hypertrophy.    BOWEL: No bowel obstruction. Normal appendix. Large fecal load in the   rectum with mild perirectal fatty infiltration.  PERITONEUM: No ascites, pneumoperitoneum, or loculated collection. No   mesenteric lymphadenopathy.  VESSELS: Atherosclerotic calcifications of the aortoiliac tree. Normal   caliber abdominal aorta.  RETROPERITONEUM/LYMPH NODES: No lymphadenopathy.  ABDOMINAL WALL: Within normal limits.  BONES: Degenerative changes of the spine.    IMPRESSION:    Distended gallbladder with minimal gallbladder wallhyperemia. Correlate   with LFTs. Sonographic evaluation may be considered, as clinically   indicated.    Patchy areas of decreased enhancement in both kidneys which raises   concern for pyelonephritis. Correlate with urinalysis.    Large fecal load in the rectum with mild perirectal fatty infiltration   which raises question of stercoral colitis.    Prostatomegaly.    Ill-defined patchy groundglass opacities at the lung bases. Small right   pleural effusion.             Chief Complaint:  Patient is a 86y old  Male who presents with a chief complaint of Abdominal pain     (24 Jun 2022 12:13)      HPI: 86 year old Male with a PMHx of pulmonary fibrosis on chronic prednisone and home O2 4L NC, T2DM, HTN, AFib (on Eliquis- currently taking), GERD, dysphagia (on ground diet and thin liquid diet at NH), protein-calorie malnutrition, and MDD (q15m safety checks at NH for SI), chronic constipation, recent tx for oral thrush, who presented to the ED with abdominal pain. History obtained from pt, chart review, and pt's daughter at bedside. Patient endorses intermittent, diffuse abd pain x 1 month. He feels the urge to move his bowels but gets only minimal relief, and occasionally tries to manually disimpact himself. He denies any associated nausea, vomiting, diarrhea, constipation, melena, hematemesis, flank pain, dysuria, hematuria, or increased urinary frequency. As per his daughter, he has not been eating much due to decreased appetite since dx with thrush. She also reported he had 1 brown BM last night that had a small amount of blood mixed in. States daughter has been giving him Tylenol for the pain, which provides some temporary relief. In the ED, CT scan was performed which showed distended gallbladder with minimal gallbladder wall hyperemia, patchy areas of decreased enhancement in both kidneys which raises concern for pyelonephritis, large fecal load in the rectum with mild perirectal fatty infiltration which raises question of stercoral colitis. GI consulted for c/f stercoral colitis.    Allergies:  No Known Allergies      Home Medications:    Hospital Medications:  acetaminophen     Tablet .. 650 milliGRAM(s) Oral every 6 hours PRN  acetylcysteine 20%  Inhalation 1 milliLiter(s) Inhalation every 6 hours  aluminum hydroxide/magnesium hydroxide/simethicone Suspension 30 milliLiter(s) Oral every 6 hours PRN  apixaban 2.5 milliGRAM(s) Oral every 12 hours  aspirin  chewable 81 milliGRAM(s) Oral daily  bisacodyl 5 milliGRAM(s) Oral at bedtime  bisacodyl Suppository 10 milliGRAM(s) Rectal daily  cefTRIAXone   IVPB 1000 milliGRAM(s) IV Intermittent every 24 hours  dextrose 5%. 1000 milliLiter(s) IV Continuous <Continuous>  dextrose 5%. 1000 milliLiter(s) IV Continuous <Continuous>  dextrose 50% Injectable 25 Gram(s) IV Push once  dextrose 50% Injectable 12.5 Gram(s) IV Push once  dextrose 50% Injectable 25 Gram(s) IV Push once  dextrose Oral Gel 15 Gram(s) Oral once PRN  diltiazem    milliGRAM(s) Oral daily  fluconAZOLE   Tablet 100 milliGRAM(s) Oral daily  glucagon  Injectable 1 milliGRAM(s) IntraMuscular once  hydrocortisone hemorrhoidal Suppository 1 Suppository(s) Rectal two times a day  insulin lispro (ADMELOG) corrective regimen sliding scale   SubCutaneous three times a day before meals  insulin lispro (ADMELOG) corrective regimen sliding scale   SubCutaneous at bedtime  insulin lispro Injectable (ADMELOG) 1 Unit(s) SubCutaneous three times a day before meals  ipratropium    for Nebulization 500 MICROGram(s) Nebulizer every 6 hours  pantoprazole  Injectable 40 milliGRAM(s) IV Push daily  polyethylene glycol 3350 17 Gram(s) Oral two times a day  predniSONE   Tablet 30 milliGRAM(s) Oral daily  sertraline 25 milliGRAM(s) Oral daily  silver sulfADIAZINE 1% Cream 1 Application(s) Topical daily  simethicone 80 milliGRAM(s) Chew four times a day  sodium chloride 0.9%. 1000 milliLiter(s) IV Continuous <Continuous>      PMHX/PSHX:  DM (diabetes mellitus)    H/O pulmonary fibrosis    Pulmonary fibrosis    Pulmonary fibrosis    HTN (hypertension)    Chronic atrial fibrillation    Dysphagia    GERD (gastroesophageal reflux disease)    Protein calorie malnutrition    MDD (major depressive disorder)    TB (tuberculosis)    Constipation    S/P hernia repair    H/O cataract extraction    History of prostate surgery        Family history:  No pertinent family history in first degree relatives     Denies any family history of GI-related disease or cancers.    Social History:   ETOH: per daughter +history of heavy ETOH use  Tobacco: denies  Illicit drug use: denies    ROS: 14 point ROS negative unless otherwise stated in HPI      Vital Signs:  Vital Signs Last 24 Hrs  T(C): 36.7 (24 Jun 2022 11:00), Max: 37.1 (24 Jun 2022 01:59)  T(F): 98.1 (24 Jun 2022 11:00), Max: 98.8 (24 Jun 2022 05:40)  HR: 77 (24 Jun 2022 11:00) (76 - 88)  BP: 127/75 (24 Jun 2022 11:00) (113/64 - 127/75)  BP(mean): --  RR: 18 (24 Jun 2022 11:00) (17 - 18)  SpO2: 100% (24 Jun 2022 11:00) (97% - 100%)  Daily     Daily     PHYSICAL EXAM:     GENERAL:  Appears stated age, thin, chronically ill appearing, no distress  HEENT:  NC/AT,  conjunctivae clear and pink; +MM dry  CHEST:  Full & symmetric excursion, no increased effort, +NC; +fine crackles  HEART:  Regular rhythm, S1, S2, no murmur/rub/S3/S4  ABDOMEN:  Soft, +diffusely tender, non-distended, +bowel sounds  RECTAL:  +blood around brown stool; +moderate amount of stool in rectal vault, manually disimpacted at bedside  EXTREMITIES:  no cyanosis, clubbing or edema  SKIN:  No rash/erythema/ecchymoses/petechiae/wounds/abscess/warm/dry  NEURO:  Alert, oriented      LABS:                        11.4   12.52 )-----------( 276      ( 24 Jun 2022 06:05 )             35.7     06-24    135  |  99  |  17  ----------------------------<  147<H>  4.1   |  27  |  0.58    Ca    8.9      24 Jun 2022 06:05  Phos  2.3     06-24  Mg     2.00     06-24    TPro  6.0  /  Alb  2.8<L>  /  TBili  0.3  /  DBili  x   /  AST  15  /  ALT  31  /  AlkPhos  145<H>  06-23    LIVER FUNCTIONS - ( 23 Jun 2022 05:50 )  Alb: 2.8 g/dL / Pro: 6.0 g/dL / ALK PHOS: 145 U/L / ALT: 31 U/L / AST: 15 U/L / GGT: x                   Imaging:       ACC: [id number] EXAM:  CT ABDOMEN AND PELVIS IC                          PROCEDURE DATE:  06/21/2022          INTERPRETATION:  CLINICAL INFORMATION: Acute abdominal pain    COMPARISON: None.    CONTRAST/COMPLICATIONS:  IV Contrast: Omnipaque 350  60 cc administered  Oral Contrast: NONE  Complications: None reported at time of study completion    PROCEDURE:  CT of the Abdomen and Pelvis was performed.  Sagittal and coronal reformats were performed.    FINDINGS:  LOWER CHEST: Patchy groundglass opacities at the lung bases. Mild   bronchiectasis. Calcified granuloma in the right lower lobe. Small right   pleural effusion.    LIVER: Subcentimeter hypoattenuating hepatic focus too small to   characterize.  BILE DUCTS: Normal caliber.  GALLBLADDER:Distended gallbladder with minimal gallbladder wall   hyperemia. No cholelithiasis or pericholecystic inflammatory changes.  SPLEEN: Within normal limits.  PANCREAS: Within normal limits.  ADRENALS: Within normal limits.  KIDNEYS/URETERS: Patchy areas of decreased enhancement in both kidneys..   Nonobstructing left renal stones measuring up to 3 mm. No hydronephrosis.    BLADDER: Within normal limits.  REPRODUCTIVE ORGANS: Prostate gland is enlarged with median hypertrophy.    BOWEL: No bowel obstruction. Normal appendix. Large fecal load in the   rectum with mild perirectal fatty infiltration.  PERITONEUM: No ascites, pneumoperitoneum, or loculated collection. No   mesenteric lymphadenopathy.  VESSELS: Atherosclerotic calcifications of the aortoiliac tree. Normal   caliber abdominal aorta.  RETROPERITONEUM/LYMPH NODES: No lymphadenopathy.  ABDOMINAL WALL: Within normal limits.  BONES: Degenerative changes of the spine.    IMPRESSION:    Distended gallbladder with minimal gallbladder wallhyperemia. Correlate   with LFTs. Sonographic evaluation may be considered, as clinically   indicated.    Patchy areas of decreased enhancement in both kidneys which raises   concern for pyelonephritis. Correlate with urinalysis.    Large fecal load in the rectum with mild perirectal fatty infiltration   which raises question of stercoral colitis.    Prostatomegaly.    Ill-defined patchy groundglass opacities at the lung bases. Small right   pleural effusion.           HPI: 86 year old Male with a PMHx of pulmonary fibrosis on chronic prednisone and home O2 4L NC, T2DM, HTN, AFib (on Eliquis- currently taking), GERD, dysphagia (on ground diet and thin liquid diet at NH), protein-calorie malnutrition, and MDD (q15m safety checks at NH for SI), chronic constipation, recent tx for oral thrush, who presented to the ED with abdominal pain. History obtained from pt, chart review, and pt's daughter at bedside. Patient endorses intermittent, diffuse abd pain x 1 month. He feels the urge to move his bowels but gets only minimal relief, and occasionally tries to manually disimpact himself. He denies any associated nausea, vomiting, diarrhea, constipation, melena, hematemesis, flank pain, dysuria, hematuria, or increased urinary frequency. As per his daughter, he has not been eating much due to decreased appetite since dx with thrush. She also reported he had 1 brown BM last night that had a small amount of blood mixed in. States daughter has been giving him Tylenol for the pain, which provides some temporary relief. In the ED, CT scan was performed which showed distended gallbladder with minimal gallbladder wall hyperemia, patchy areas of decreased enhancement in both kidneys which raises concern for pyelonephritis, large fecal load in the rectum with mild perirectal fatty infiltration which raises question of stercoral colitis. GI consulted for c/f stercoral colitis.    Allergies:  No Known Allergies      Home Medications:    Hospital Medications:  acetaminophen     Tablet .. 650 milliGRAM(s) Oral every 6 hours PRN  acetylcysteine 20%  Inhalation 1 milliLiter(s) Inhalation every 6 hours  aluminum hydroxide/magnesium hydroxide/simethicone Suspension 30 milliLiter(s) Oral every 6 hours PRN  apixaban 2.5 milliGRAM(s) Oral every 12 hours  aspirin  chewable 81 milliGRAM(s) Oral daily  bisacodyl 5 milliGRAM(s) Oral at bedtime  bisacodyl Suppository 10 milliGRAM(s) Rectal daily  cefTRIAXone   IVPB 1000 milliGRAM(s) IV Intermittent every 24 hours  dextrose 5%. 1000 milliLiter(s) IV Continuous <Continuous>  dextrose 5%. 1000 milliLiter(s) IV Continuous <Continuous>  dextrose 50% Injectable 25 Gram(s) IV Push once  dextrose 50% Injectable 12.5 Gram(s) IV Push once  dextrose 50% Injectable 25 Gram(s) IV Push once  dextrose Oral Gel 15 Gram(s) Oral once PRN  diltiazem    milliGRAM(s) Oral daily  fluconAZOLE   Tablet 100 milliGRAM(s) Oral daily  glucagon  Injectable 1 milliGRAM(s) IntraMuscular once  hydrocortisone hemorrhoidal Suppository 1 Suppository(s) Rectal two times a day  insulin lispro (ADMELOG) corrective regimen sliding scale   SubCutaneous three times a day before meals  insulin lispro (ADMELOG) corrective regimen sliding scale   SubCutaneous at bedtime  insulin lispro Injectable (ADMELOG) 1 Unit(s) SubCutaneous three times a day before meals  ipratropium    for Nebulization 500 MICROGram(s) Nebulizer every 6 hours  pantoprazole  Injectable 40 milliGRAM(s) IV Push daily  polyethylene glycol 3350 17 Gram(s) Oral two times a day  predniSONE   Tablet 30 milliGRAM(s) Oral daily  sertraline 25 milliGRAM(s) Oral daily  silver sulfADIAZINE 1% Cream 1 Application(s) Topical daily  simethicone 80 milliGRAM(s) Chew four times a day  sodium chloride 0.9%. 1000 milliLiter(s) IV Continuous <Continuous>      PMHX/PSHX:  DM (diabetes mellitus)    H/O pulmonary fibrosis    Pulmonary fibrosis    Pulmonary fibrosis    HTN (hypertension)    Chronic atrial fibrillation    Dysphagia    GERD (gastroesophageal reflux disease)    Protein calorie malnutrition    MDD (major depressive disorder)    TB (tuberculosis)    Constipation    S/P hernia repair    H/O cataract extraction    History of prostate surgery        Family history:  No pertinent family history in first degree relatives     Denies any family history of GI-related disease or cancers.    Social History:   ETOH: per daughter +history of heavy ETOH use  Tobacco: denies  Illicit drug use: denies    ROS: 14 point ROS negative unless otherwise stated in HPI      Vital Signs:  Vital Signs Last 24 Hrs  T(C): 36.7 (24 Jun 2022 11:00), Max: 37.1 (24 Jun 2022 01:59)  T(F): 98.1 (24 Jun 2022 11:00), Max: 98.8 (24 Jun 2022 05:40)  HR: 77 (24 Jun 2022 11:00) (76 - 88)  BP: 127/75 (24 Jun 2022 11:00) (113/64 - 127/75)  BP(mean): --  RR: 18 (24 Jun 2022 11:00) (17 - 18)  SpO2: 100% (24 Jun 2022 11:00) (97% - 100%)  Daily     Daily     PHYSICAL EXAM:     GENERAL:  Appears stated age, thin, chronically ill appearing, no distress  HEENT:  NC/AT,  conjunctivae clear and pink; +MM dry  CHEST:  Full & symmetric excursion, no increased effort, +NC; +fine crackles  HEART:  Regular rhythm, S1, S2, no murmur/rub/S3/S4  ABDOMEN:  Soft, +diffusely tender, non-distended, +bowel sounds  RECTAL:  +blood around brown stool; +moderate amount of stool in rectal vault, manually disimpacted at bedside  EXTREMITIES:  no cyanosis, clubbing or edema  SKIN: Normal temperature, turgor and texture; no rash  PSYCH: Appropriate affect, alert and oriented to person, place and time  NEURO: No tremor, asterixis       LABS:                        11.4   12.52 )-----------( 276      ( 24 Jun 2022 06:05 )             35.7     06-24    135  |  99  |  17  ----------------------------<  147<H>  4.1   |  27  |  0.58    Ca    8.9      24 Jun 2022 06:05  Phos  2.3     06-24  Mg     2.00     06-24    TPro  6.0  /  Alb  2.8<L>  /  TBili  0.3  /  DBili  x   /  AST  15  /  ALT  31  /  AlkPhos  145<H>  06-23    LIVER FUNCTIONS - ( 23 Jun 2022 05:50 )  Alb: 2.8 g/dL / Pro: 6.0 g/dL / ALK PHOS: 145 U/L / ALT: 31 U/L / AST: 15 U/L / GGT: x                   Imaging:       ACC: [id number] EXAM:  CT ABDOMEN AND PELVIS IC                          PROCEDURE DATE:  06/21/2022          INTERPRETATION:  CLINICAL INFORMATION: Acute abdominal pain    COMPARISON: None.    CONTRAST/COMPLICATIONS:  IV Contrast: Omnipaque 350  60 cc administered  Oral Contrast: NONE  Complications: None reported at time of study completion    PROCEDURE:  CT of the Abdomen and Pelvis was performed.  Sagittal and coronal reformats were performed.    FINDINGS:  LOWER CHEST: Patchy groundglass opacities at the lung bases. Mild   bronchiectasis. Calcified granuloma in the right lower lobe. Small right   pleural effusion.    LIVER: Subcentimeter hypoattenuating hepatic focus too small to   characterize.  BILE DUCTS: Normal caliber.  GALLBLADDER:Distended gallbladder with minimal gallbladder wall   hyperemia. No cholelithiasis or pericholecystic inflammatory changes.  SPLEEN: Within normal limits.  PANCREAS: Within normal limits.  ADRENALS: Within normal limits.  KIDNEYS/URETERS: Patchy areas of decreased enhancement in both kidneys..   Nonobstructing left renal stones measuring up to 3 mm. No hydronephrosis.    BLADDER: Within normal limits.  REPRODUCTIVE ORGANS: Prostate gland is enlarged with median hypertrophy.    BOWEL: No bowel obstruction. Normal appendix. Large fecal load in the   rectum with mild perirectal fatty infiltration.  PERITONEUM: No ascites, pneumoperitoneum, or loculated collection. No   mesenteric lymphadenopathy.  VESSELS: Atherosclerotic calcifications of the aortoiliac tree. Normal   caliber abdominal aorta.  RETROPERITONEUM/LYMPH NODES: No lymphadenopathy.  ABDOMINAL WALL: Within normal limits.  BONES: Degenerative changes of the spine.    IMPRESSION:    Distended gallbladder with minimal gallbladder wall hyperemia. Correlate   with LFTs. Sonographic evaluation may be considered, as clinically   indicated.    Patchy areas of decreased enhancement in both kidneys which raises   concern for pyelonephritis. Correlate with urinalysis.    Large fecal load in the rectum with mild perirectal fatty infiltration   which raises question of stercoral colitis.    Prostatomegaly.    Ill-defined patchy groundglass opacities at the lung bases. Small right   pleural effusion.

## 2022-06-24 NOTE — DIETITIAN INITIAL EVALUATION ADULT - OTHER INFO
86 year old male with a PMH of pulmonary fibrosis on chronic prednisone, T2DM, HTN, GERD, dysphagia (on ground diet and thin liquid diet at NH), protein-calorie malnutrition, and MDD, chronic constipation, recent oral thrush, who presented to the ED with abdominal pain, s/p swallow evaluation with recommendation for minced and moist/mildly thick liquids per chart.    Patient reports poor appetite in house, noted with untouched breakfast tray per observation. No GI distress reported at this time. Has no food allergies. Patient unable to provide UBW at this time. No recent weights per HIE. ABW is 112.4 lbs. (6/21) per chart. No edema or pressure injuries noted per RN flow sheet. Patient noted with low phosphorus and is being replete per chart.    Diet education deferred at this time given patient being lethargic, nutrition remains available as needed.

## 2022-06-24 NOTE — DIETITIAN INITIAL EVALUATION ADULT - ORAL INTAKE PTA/DIET HISTORY
Patient seen for assessment. Information obtained with help from  #[id number]. Patient lethargic during assessment. Reports poor appetite x 1 month and states it's r/t his lung condition. Patient noted with abdominal pain x 1 month and decreased PO intake PTA per H&P. Patient not able to provide diet PTA at this time. Noted with A1c 6.2% (6/22) per chart indicating good glycemic control.

## 2022-06-24 NOTE — DIETITIAN INITIAL EVALUATION ADULT - PERTINENT MEDS FT
MEDICATIONS  (STANDING):  acetylcysteine 20%  Inhalation 1 milliLiter(s) Inhalation every 6 hours  apixaban 2.5 milliGRAM(s) Oral every 12 hours  aspirin  chewable 81 milliGRAM(s) Oral daily  bisacodyl 5 milliGRAM(s) Oral at bedtime  bisacodyl Suppository 10 milliGRAM(s) Rectal daily  cefTRIAXone   IVPB 1000 milliGRAM(s) IV Intermittent every 24 hours  dextrose 5%. 1000 milliLiter(s) (50 mL/Hr) IV Continuous <Continuous>  dextrose 5%. 1000 milliLiter(s) (100 mL/Hr) IV Continuous <Continuous>  dextrose 50% Injectable 25 Gram(s) IV Push once  dextrose 50% Injectable 12.5 Gram(s) IV Push once  dextrose 50% Injectable 25 Gram(s) IV Push once  diltiazem    milliGRAM(s) Oral daily  fluconAZOLE   Tablet 100 milliGRAM(s) Oral daily  glucagon  Injectable 1 milliGRAM(s) IntraMuscular once  hydrocortisone hemorrhoidal Suppository 1 Suppository(s) Rectal two times a day  insulin lispro (ADMELOG) corrective regimen sliding scale   SubCutaneous three times a day before meals  insulin lispro (ADMELOG) corrective regimen sliding scale   SubCutaneous at bedtime  insulin lispro Injectable (ADMELOG) 1 Unit(s) SubCutaneous three times a day before meals  ipratropium    for Nebulization 500 MICROGram(s) Nebulizer every 6 hours  pantoprazole  Injectable 40 milliGRAM(s) IV Push daily  polyethylene glycol 3350 17 Gram(s) Oral two times a day  predniSONE   Tablet 30 milliGRAM(s) Oral daily  sertraline 25 milliGRAM(s) Oral daily  silver sulfADIAZINE 1% Cream 1 Application(s) Topical daily  simethicone 80 milliGRAM(s) Chew four times a day  sodium chloride 0.9%. 1000 milliLiter(s) (60 mL/Hr) IV Continuous <Continuous>    MEDICATIONS  (PRN):  acetaminophen     Tablet .. 650 milliGRAM(s) Oral every 6 hours PRN Severe Pain (7 - 10)  aluminum hydroxide/magnesium hydroxide/simethicone Suspension 30 milliLiter(s) Oral every 6 hours PRN Dyspepsia  dextrose Oral Gel 15 Gram(s) Oral once PRN Blood Glucose LESS THAN 70 milliGRAM(s)/deciliter

## 2022-06-24 NOTE — BH CONSULTATION LIAISON PROGRESS NOTE - NSBHFUPINTERVALHXFT_PSY_A_CORE
cc: I am feeling fine.  Daughter at bedside. Daughter ok with 1:1 status because it gives patient more care and attention but upset that it led to his phone being taken away, which makes him more disconnected from family. Discussed that 1:1 status and its rationale. Daughter reiterates that patient does not have a h/o mental health issues, no history of si or sa and she feels his physical distress from pain and language barrier has been contributory.     Interviewed the patient in private using a  # [id number]. Patient does struggle with orientation, takes cues from the board on the wall. He is much more comfortable today, denies any abdominal pain. He feels his family is very supportive, and has been visiting him at the rehab frequently. He knows that he has been a rehab for the last 2 months, and has no issues with it. He does not recall making any statement of SI either at rehab or in the ED yesterday. He denies any mood symptoms when asked today, he is future oriented, and hopes that one day he can 'cook for my wife and children'- he used to work as a  at [employer] [place] [employer]. He denies any ah or vh or paranoia.   Coordinated below plan with daughter Mena at length, and also with medicine acp. All questions answered
cc: I want to go to rehab  Met with the patient. Interviewed using  # [id number]. Patient struggles with orientation today. Is rather tired appearing, requires frequent tactile stimulation to engage in conversation. He denies any mood symptoms, no si or hi, or ah or vh or paranoia. He states that he feels 'ok' abdominal pain sterling.     Daughter Claudia at bedside. She states that patient does not have any mental health issues, is a 'happy man' and as a family they want him to get better so that he can come home. They have no concerns for his safety. All her questions and concerns were addressed.

## 2022-06-24 NOTE — BH CONSULTATION LIAISON PROGRESS NOTE - NSICDXBHPRIMARYDX_PSY_ALL_CORE
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

## 2022-06-24 NOTE — PROGRESS NOTE ADULT - ASSESSMENT
Patient is an 86 year old Male with a PMHx of pulmonary fibrosis on chronic prednisone and home O2 4L NC, T2DM, HTN, AFib (on Eliquis), GERD, dysphagia (on ground diet and thin liquid diet at NH), protein-calorie malnutrition, and MDD (Q15 minute safety checks at NH for SI), chronic constipation, recent oral thrush, who presented to the ED with abdominal pain.  found to have hypoglycemia at admission    1. 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  HbA1c 6.2%  hypoglycemia on admission related to receiving Lantus 16 units qhs at rehab    While inpatient:  BG target 100-180 mg/dl  Glucose above goal; pts appetite fair; will continue to trend glucose on current regimen  Continue Admelog 1/1/1 units premeal (please hold if npo/not eating)  Continue Admelog low correction scale TID AC and seperate order for Admelog low correction scale at bedtime  FS before meals and at bedtime   Consistent Carbohydrate diet   Hypoglycemia Protocol    Discharge Plan:  If continuing prednisone 30mg daily without taper please start repaglinide 0.5mg premeal TID (please hold if skips meals)  Patient's daughter prefers to avoid insulin for dc.  Ensure patient has working glucometer, test strips, lancets, alcohol pads, and BD jose luis pen needles  Please also prescribe glucose tabs, Baqsimi nasal spray or glucagon emergency kit for hypoglycemia risk   Please follow up with opthalmology and podiatry as an outpt  Can follow up with PCP or if needed, [facility] endocrinology [phone]    2. long term use of steroids   please note, pt is on long term steroids -  currently on prednisone 30mg daily as per daughter this was started 1 month ago; d/w primary team planning on continuing without taper at this time   Do not abruptly stop steroid risk for secondary/tertiary adrenal insufficiency      3. long steroid use can lead to osteoporosis   check vitamin D level: 29.9 decreased  Consider starting vitamin d 1000 iu daily   outpt DXA     Annmarie Ortega  Nurse Practitioner  Division of Endocrinology & Diabetes  In house pager #[phone]    If before 9AM or after 6PM, or on weekends/holidays, please call endocrine answering service for assistance ([phone]).For nonurgent matters email [email or web address] for assistance.    Patient is an 86 year old Male with a PMHx of pulmonary fibrosis on chronic prednisone and home O2 4L NC, T2DM, HTN, AFib (on Eliquis), GERD, dysphagia (on ground diet and thin liquid diet at NH), protein-calorie malnutrition, and MDD (Q15 minute safety checks at NH for SI), chronic constipation, recent oral thrush, who presented to the ED with abdominal pain.  found to have hypoglycemia at admission    1. 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  HbA1c 6.2%  hypoglycemia on admission related to receiving Lantus 16 units qhs at rehab    While inpatient:  BG target 100-180 mg/dl  Glucose above goal; pts appetite fair; will continue to trend glucose on current regimen  Continue Admelog 1/1/1 units premeal (please hold if npo/not eating)  Continue Admelog low correction scale TID AC and seperate order for Admelog low correction scale at bedtime  FS before meals and at bedtime   Consistent Carbohydrate diet   Hypoglycemia Protocol  Please consider switching dextrose solution in antibiotics to sodium chloride    Discharge Plan:  If continuing prednisone 30mg daily without taper please start repaglinide 0.5mg premeal TID (please hold if skips meals)  Patient's daughter prefers to avoid insulin for dc.  Ensure patient has working glucometer, test strips, lancets, alcohol pads, and BD jose luis pen needles  Please also prescribe glucose tabs, Baqsimi nasal spray or glucagon emergency kit for hypoglycemia risk   Please follow up with opthalmology and podiatry as an outpt  Can follow up with PCP or if needed, [facility] endocrinology [phone]    2. long term use of steroids   please note, pt is on long term steroids -  currently on prednisone 30mg daily as per daughter this was started 1 month ago; d/w primary team planning on continuing without taper at this time   Do not abruptly stop steroid risk for secondary/tertiary adrenal insufficiency      3. long steroid use can lead to osteoporosis   check vitamin D level: 29.9 decreased  Consider starting vitamin d 1000 iu daily   outpt DXA     D/w Sanaz PITT and RN     Annmarie Ortega  Nurse Practitioner  Division of Endocrinology & Diabetes  In house pager #[phone]    If before 9AM or after 6PM, or on weekends/holidays, please call endocrine answering service for assistance ([phone]).For nonurgent matters email [email or web address] for assistance.    Patient is an 86 year old Male with a PMHx of pulmonary fibrosis on chronic prednisone and home O2 4L NC, T2DM, HTN, AFib (on Eliquis), GERD, dysphagia (on ground diet and thin liquid diet at NH), protein-calorie malnutrition, and MDD (Q15 minute safety checks at NH for SI), chronic constipation, recent oral thrush, who presented to the ED with abdominal pain.  found to have hypoglycemia at admission    1. 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  HbA1c 6.2%  hypoglycemia on admission related to receiving Lantus 16 units qhs at rehab    While inpatient:  BG target 100-180 mg/dl  Glucose above goal; pts appetite variable; daughter at bedside feeding pt   Increase Admelog 2 units premeal (please hold if npo/not eating)  Continue Admelog low correction scale TID AC and seperate order for Admelog low correction scale at bedtime  FS before meals and at bedtime   Consistent Carbohydrate diet   Hypoglycemia Protocol  Please consider switching dextrose solution in antibiotics to sodium chloride    Discharge Plan:  If continuing prednisone 30mg daily without taper please start repaglinide 0.5mg premeal TID (please hold if skips meals)  Patient's daughter prefers to avoid insulin for dc.  Ensure patient has working glucometer, test strips, lancets, alcohol pads, and BD jose luis pen needles  Please also prescribe glucose tabs, Baqsimi nasal spray or glucagon emergency kit for hypoglycemia risk   Please follow up with opthalmology and podiatry as an outpt  Can follow up with PCP or if needed, [facility] endocrinology [phone]    2. long term use of steroids   please note, pt is on long term steroids -  currently on prednisone 30mg daily as per daughter this was started 1 month ago; d/w primary team planning on continuing without taper at this time   Do not abruptly stop steroid risk for secondary/tertiary adrenal insufficiency      3. long steroid use can lead to osteoporosis   check vitamin D level: 29.9 decreased  Consider starting vitamin d 1000 iu daily   outpt DXA     D/w Sanaz PITT and RN     Annmarie Ortega  Nurse Practitioner  Division of Endocrinology & Diabetes  In house pager #[phone]    If before 9AM or after 6PM, or on weekends/holidays, please call endocrine answering service for assistance ([phone]).For nonurgent matters email [email or web address] for assistance.

## 2022-06-24 NOTE — PROGRESS NOTE ADULT - PROBLEM SELECTOR PLAN 6
[ x ] Chronic atrial fibrillation refers to afib that has been present for > 3 months but type of atrial fibrillation is unknown.  [  ] Persistent atrial fibrillation does not terminate within 7 days, or requires repeat pharmacological or electrical conversion  [  ] Longstanding persistent atrial fibrillation is persistent afib lasting more than 12 months  [  ] Permanent atrial fibrillation is persistent or longstanding persistent atrial fibrillation where cardioversion is not indicated, or cannot or will not be performed  [  ] Paroxysmal atrial fibrillation terminates spontaneously or with intervention within 7 days of onset. Episodes may recur with variable frequency.- Patient with history of AFib, on Eliquis 2.5 mg PO BID and Diltiazem  mg PO daily  - HR on exam is regular   - EKG noted- NSR at 82 min   - Continue Eliquis and Diltiazem at home doses [ x ] Chronic atrial fibrillation refers to afib that has been present for > 3 months but type of atrial fibrillation is unknown.  [  ] Persistent atrial fibrillation does not terminate within 7 days, or requires repeat pharmacological or electrical conversion  [  ] Longstanding persistent atrial fibrillation is persistent afib lasting more than 12 months  [  ] Permanent atrial fibrillation is persistent or longstanding persistent atrial fibrillation where cardioversion is not indicated, or cannot or will not be performed  [  ] Paroxysmal atrial fibrillation terminates spontaneously or with intervention within 7 days of onset. Episodes may recur with variable frequency.- Patient with history of AFib, on Eliquis 2.5 mg PO BID and Diltiazem  mg PO daily  - HR on exam is regular   - EKG noted- NSR at 82 min   - Continue Eliquis and Diltiazem at home doses,  no GI contraindication for continuing AC given stable/rising Hgb

## 2022-06-24 NOTE — BH CONSULTATION LIAISON PROGRESS NOTE - NSBHCHARTREVIEWVS_PSY_A_CORE FT
Vital Signs Last 24 Hrs  T(C): 36.2 (22 Jun 2022 11:03), Max: 36.7 (21 Jun 2022 16:30)  T(F): 97.2 (22 Jun 2022 11:03), Max: 98.1 (21 Jun 2022 16:30)  HR: 69 (22 Jun 2022 11:13) (66 - 93)  BP: 127/60 (22 Jun 2022 11:03) (122/59 - 152/80)  BP(mean): --  RR: 16 (22 Jun 2022 11:03) (16 - 18)  SpO2: 98% (22 Jun 2022 11:13) (96% - 100%)
Vital Signs Last 24 Hrs  T(C): 36.7 (24 Jun 2022 11:00), Max: 37.1 (24 Jun 2022 01:59)  T(F): 98.1 (24 Jun 2022 11:00), Max: 98.8 (24 Jun 2022 05:40)  HR: 77 (24 Jun 2022 11:00) (76 - 88)  BP: 127/75 (24 Jun 2022 11:00) (113/64 - 127/75)  BP(mean): --  RR: 18 (24 Jun 2022 11:00) (17 - 18)  SpO2: 100% (24 Jun 2022 11:00) (97% - 100%)

## 2022-06-24 NOTE — BH CONSULTATION LIAISON PROGRESS NOTE - NSBHASSESSMENTFT_PSY_ALL_CORE
Patient is an 86 year old Male with a PMHx of pulmonary fibrosis on chronic prednisone and home O2 4L NC, T2DM, HTN, AFib (on Eliquis), GERD, dysphagia (on ground diet and thin liquid diet at NH), protein-calorie malnutrition, and MDD (Q15 minute safety checks at NH as per records), chronic constipation, recent oral thrush, who presented to the ED with abdominal pain. Psychiatry has been consulted for assessment of mood and safety. Medicine team has patient on 1:1 CO in the ED.     During my evaluation this afternoon, patient denied any si or hi, and endorsed sadness in the context of his declining health, dependence on others, distressing symptoms such as pain, constipation. Later this afternoon though, he reiterated SI when medicine ACP reassessed him.     6/22-- denies any mood symptoms, denies any si or hi, or psychosis. Suspecting that SI statement made in ED was in context to excruciating abdominal pain that he had presented with.     Recommendations  - No indication for a psychiatric 1:1 CO at this time. Monitor behaviors though. Patient will benefit from a  south admission  - Ensure that pain, and constipation is addressed.  - Continue UTI treatment  - Ensure qtc<500. Continue Zoloft  - Coordinate care with daughter  
Patient is an 86 year old Male with a PMHx of pulmonary fibrosis on chronic prednisone and home O2 4L NC, T2DM, HTN, AFib (on Eliquis), GERD, dysphagia (on ground diet and thin liquid diet at NH), protein-calorie malnutrition, and MDD (Q15 minute safety checks at NH as per records), chronic constipation, recent oral thrush, who presented to the ED with abdominal pain. Psychiatry has been consulted for assessment of mood and safety. Medicine team has patient on 1:1 CO in the ED.     During my evaluation this afternoon, patient denied any si or hi, and endorsed sadness in the context of his declining health, dependence on others, distressing symptoms such as pain, constipation. Later this afternoon though, he reiterated SI when medicine ACP reassessed him.     6/22-- denies any mood symptoms, denies any si or hi, or psychosis. Suspecting that SI statement made in ED was in context to excruciating abdominal pain that he had presented with.   6/24--more lethargic, denies any mood symptoms, denies any si or hi, no psychosis.    Recommendations  - No indication for a psychiatric 1:1 CO at this time. Monitor behaviors though. Patient will benefit from a  south admission  - Ensure that pain, and constipation is addressed.  - Continue UTI treatment  - Ensure qtc<500. Continue Zoloft  - Coordinate care with daughter

## 2022-06-24 NOTE — PROGRESS NOTE ADULT - PROBLEM SELECTOR PLAN 9
- Patient with stage I pressure ulcer to L buttock present on arrival  - wound care with silvadene cream continue as ordered in NH  -Skin care as per protocol

## 2022-06-24 NOTE — CONSULT NOTE ADULT - ASSESSMENT
86 year old Male with a PMHx of pulmonary fibrosis on chronic prednisone and home O2 4L NC, T2DM, HTN, AFib (on Eliquis- currently taking), GERD, dysphagia (on ground diet and thin liquid diet at NH), protein-calorie malnutrition, and MDD (q15m safety checks at NH for SI), chronic constipation, recent tx for oral thrush, who presented to the ED with abdominal pain. Found to findings c/w sterocoral colitis on imaging. GI consulted for c/f stercoral colitis.    Impression:  #chronic abdominal pain x 1 month  #stercoral colitis 2/2 chronic constipation  Patient endorses intermittent, diffuse abd pain x 1 month. He feels the urge to move his bowels but gets only minimal relief, and occasionally tries to manually disimpact himself. Has been having brown BM with a small amount of blood mixed around the stool. In the ED, CT scan was performed which showed distended gallbladder with minimal gallbladder wall hyperemia, patchy areas of decreased enhancement in both kidneys which raises concern for pyelonephritis, large fecal load in the rectum with mild perirectal fatty infiltration which raises question of stercoral colitis. Hgb stable at 11 this admission (unknown BL). Rectal with moderate brown stool in rectal vault with mild blood around stool, s/p manually disimpacted 6/24 at bedside. Was up to date with screening/surveillance colonoscopies until age 77. No family h/o GI cancers. CRC is possible but low suspicion at this time.    #pulmonary fibrosis on chronic prednisone and home O2 4L NC  #AFib (on Eliquis- currently taking)  #GERD  #FTT, protein-calorie malnutrition- as per his daughter, he has not been eating much due to decreased appetite since dx with thrush 1 month ago  #SI/MDD    Recommendations:  -miralax TID, senna 17.2 QHS, BID SMOG enemas  -no GI contraindication for continuing AC  -will hold off on endoscopic evaluation at this time  -consider nutrition consult  -consider PPI 40 mg daily while on chronic prednisone  -minimize opiates and anticholinergic medications  -encourage ambulation if pt is not a fall risk      **THIS NOTE IS NOT FINALIZED UNTIL SIGNED BY THE ATTENDING**    Daphnie Deshpande MD  GI Fellow, PGY-4  Available via [employer] Teams    NON-URGENT CONSULTS:  Please email [email or web address]  AT NIGHT AND ON WEEKENDS:  Contact on-call GI fellow via answering service ([phone]) from 5pm-8am and on weekends/holidays  MONDAY-FRIDAY 8AM-5PM:  Pager# [phone] ([facility]) or [phone] ([facility])  GI Phone# [phone] ([facility])   86 year old Male with a PMHx of pulmonary fibrosis on chronic prednisone and home O2 4L NC, T2DM, HTN, AFib (on Eliquis- currently taking), GERD, dysphagia (on ground diet and thin liquid diet at NH), protein-calorie malnutrition, and MDD (q15m safety checks at NH for SI), chronic constipation, recent tx for oral thrush, who presented to the ED with abdominal pain. Found to findings c/w sterocoral colitis on imaging. GI consulted for c/f stercoral colitis.    Impression:  #chronic abdominal pain x 1 month  #stercoral colitis 2/2 chronic constipation  Hgb stable at 11 this admission (unknown BL). Rectal with moderate brown stool in rectal vault with mild blood around stool, s/p manually disimpacted 6/24 at bedside. Was up to date with screening/surveillance colonoscopies until age 77. No family h/o GI cancers. CRC is possible but low suspicion at this time.    #Pulmonary fibrosis on chronic prednisone and home O2 4L NC  #AFib (on Eliquis- currently taking)  #GERD  #FTT, protein-calorie malnutrition- as per his daughter, he has not been eating much due to decreased appetite since dx with thrush 1 month ago  #SI/MDD    Recommendations:  -miralax TID, senna 17.2 QHS, BID SMOG enemas as needed  -no GI contraindication for continuing AC given stable/rising Hgb  -will hold off on endoscopic evaluation at this time  -consider nutrition consult  -consider PPI 40 mg daily while on chronic prednisone  -minimize opiates and anticholinergic medications  -encourage ambulation if pt is not a fall risk      **THIS NOTE IS NOT FINALIZED UNTIL SIGNED BY THE ATTENDING**    Daphnie Deshpande MD  GI Fellow, PGY-4  Available via [employer] Teams    NON-URGENT CONSULTS:  Please email [email or web address]  AT NIGHT AND ON WEEKENDS:  Contact on-call GI fellow via answering service ([phone]) from 5pm-8am and on weekends/holidays  MONDAY-FRIDAY 8AM-5PM:  Pager# [phone] ([facility]) or [phone] ([facility])  GI Phone# [phone] ([facility])

## 2022-06-24 NOTE — PHYSICAL THERAPY INITIAL EVALUATION ADULT - PERTINENT HX OF CURRENT PROBLEM, REHAB EVAL
86 year old Male with a PMHx of pulmonary fibrosis on chronic prednisone and home O2 4L NC, T2DM, HTN, AFib (on Eliquis), GERD, dysphagia (on ground diet and thin liquid diet at NH), protein-calorie malnutrition, and MDD (Q15 minute safety checks at NH for SI), chronic constipation, recent oral thrush, who presented to the ED with c/o abdominal pain.

## 2022-06-24 NOTE — DIETITIAN INITIAL EVALUATION ADULT - ADD RECOMMEND
Adjust liquid consistency to mildly thick per SLP recommendation. Obtain weekly weight and document PO intake to monitor trend.

## 2022-06-24 NOTE — BH CONSULTATION LIAISON PROGRESS NOTE - NSICDXBHSECONDARYDX_PSY_ALL_CORE
Abdominal pain   R10.9  Encephalopathy acute   G93.40  Encephalopathy acute   G93.40  
Abdominal pain   R10.9  Encephalopathy acute   G93.40

## 2022-06-24 NOTE — CONSULT NOTE ADULT - ATTENDING COMMENTS
#Rectal/outlet bleeding  #CT with large fecal load in rectum and possible changes c/w stercoral colitis    --S/p rectal disimpaction  --Aggressive bowel regimen given ongoing constipation  --No urgent indication for endoscopic evaluation; though suspect bleeding/CT findings 2/2 stercoral colitis, can address possible endoscopic evaluation with unsedated (or minimally sedated) flex sig to evaluate for alternate etiologies if recurrent bleeding, etc  --A/C per primary team #Rectal/outlet bleeding  #CT with large fecal load in rectum and possible changes c/w stercoral colitis    --S/p rectal disimpaction with significant improvement in symptoms  --Aggressive bowel regimen given ongoing constipation  --No urgent indication for endoscopic evaluation; though suspect bleeding/CT findings 2/2 stercoral colitis, can address possible endoscopic evaluation with unsedated (or minimally sedated) flex sig to evaluate for alternate etiologies if recurrent bleeding, etc  --A/C per primary team    Discussed with patient and sister at bedside, both in agreement with above plan.

## 2022-06-24 NOTE — DIETITIAN NUTRITION RISK NOTIFICATION - TREATMENT: THE FOLLOWING DIET HAS BEEN RECOMMENDED
Diet, Minced and Moist:   Consistent Carbohydrate {Evening Snack} (CSTCHOSN) (06-23-22 @ 11:44) [Active]

## 2022-06-24 NOTE — PHYSICAL THERAPY INITIAL EVALUATION ADULT - LIVES WITH, PROFILE
admitted from rehab facility where pt was ambulated ~ 10 steps with assist and rolling walker ;  prior to rehab pt lives at home with spouse (spouse has h/o dementia and has an aide)

## 2022-06-25 NOTE — PROGRESS NOTE ADULT - PROBLEM SELECTOR PLAN 6
[ x ] Chronic atrial fibrillation refers to afib that has been present for > 3 months but type of atrial fibrillation is unknown.  [  ] Persistent atrial fibrillation does not terminate within 7 days, or requires repeat pharmacological or electrical conversion  [  ] Longstanding persistent atrial fibrillation is persistent afib lasting more than 12 months  [  ] Permanent atrial fibrillation is persistent or longstanding persistent atrial fibrillation where cardioversion is not indicated, or cannot or will not be performed  [  ] Paroxysmal atrial fibrillation terminates spontaneously or with intervention within 7 days of onset. Episodes may recur with variable frequency.- Patient with history of AFib, on Eliquis 2.5 mg PO BID and Diltiazem  mg PO daily  - HR on exam is regular   - EKG noted- NSR at 82 min   - Continue Eliquis and Diltiazem at home doses,  no GI contraindication for continuing AC given stable/rising Hgb as per daughter, pt has no Afib, refusing Eliquis. As per daughter , pt was recently in [facility] and he was sick , and some irregular heart rhythm and was started on eliquis  - d/w daughter , pt may have paroxysmal Afib , which also can cause CVA , will repeat EKG, transfer to Telemetry to evaluate for paroxsymal Afib,  cardiology eval also requested   - HR on exam is regular   - EKG noted- NSR at 82 min   - daughter refusing  Eliquis until further w/u   -c/w  Diltiazem at home doses,

## 2022-06-25 NOTE — PROGRESS NOTE ADULT - PROBLEM SELECTOR PLAN 2
- UA: moderate leuk esterase, 13 WBCs, few bacteria  - CT A/P: Patchy areas of decreased enhancement in both kidneys which raises concern for pyelonephritis.  - Patient afebrile, no leukocytosis  - Patient received and tolerated 2 g Ceftriaxone in the ED  - Varma catheter placed in the ED, TOV in Progress   - Urine culture sent, F/U results  - For now continue Ceftriaxone 1gm daily  - Low suspicion for acute pyelonephritis as source of symptoms, given patient recently completed 7d course of Zosyn for presumed PNA per daughter yesterday, will deescalate  ABX if cx negative and patient improving  - if pt with successful TOV, will DC ABx - UA: moderate leuk esterase, 13 WBCs, few bacteria  - CT A/P: Patchy areas of decreased enhancement in both kidneys which raises concern for pyelonephritis.  - Patient afebrile, no leukocytosis  - Patient received and tolerated 2 g Ceftriaxone in the ED  - Varma catheter placed in the ED, TOV in Progress   - Urine culture sent, F/U results  - Low suspicion for acute pyelonephritis as source of symptoms, given patient recently completed 7d course of Zosyn for presumed PNA per daughter yesterday, will deescalate  ABX if cx negative and patient improving  - pt s/p Successful TOV, leucocytosis resolved, will dc Abx

## 2022-06-25 NOTE — PROGRESS NOTE ADULT - PROBLEM SELECTOR PLAN 1
- Patient presenting with intermittent abdominal pain x 1 month that acutely worsening over the past 3 days as per daughter; worst when patient is straining to have BM   - CT A/P with IV contrast: Distended gallbladder with minimal gallbladder wall hyperemia. Patchy areas of decreased enhancement in both kidneys which raises concern for pyelonephritis. Large fecal load in the rectum with mild perirectal fatty infiltration which raises question of stercoral colitis.   - Abdomen soft, non-tender, non-distended on exam, there is no peritoneal signs and no RUQ tenderness   - Patient afebrile, now with mild leukocytosis, likely reactive to stercoral colitis   - Alk phos 136, AST & ALT WNL  - low suspicion for cholecystitis based on clinical exam, can hold off on RUQ sono for now  - symptoms likely related to stercoral colitis found on CT scan given LLQ pain   - pt s/p Manual disimpaction by GI team  , GI recommend -miralax TID, senna 17.2 QHS, BID SMOG enemas as needed  - Patients daughter reports noticing a small amount of blood mixed in the patient's brown stool, likely from straining. No significant bleeding. H&H stable   Will continue Eliquis at this time and monitor. - Patient presenting with intermittent abdominal pain x 1 month that acutely worsening over the past 3 days as per daughter; worst when patient is straining to have BM   - CT A/P with IV contrast: Distended gallbladder with minimal gallbladder wall hyperemia. Patchy areas of decreased enhancement in both kidneys which raises concern for pyelonephritis. Large fecal load in the rectum with mild perirectal fatty infiltration which raises question of stercoral colitis.   - Abdomen soft, non-tender, non-distended on exam, there is no peritoneal signs and no RUQ tenderness   - Patient afebrile, now with mild leukocytosis, likely reactive to stercoral colitis   - Alk phos 136, AST & ALT WNL  - low suspicion for cholecystitis based on clinical exam, can hold off on RUQ sono for now  - symptoms likely related to stercoral colitis found on CT scan given LLQ pain   - pt s/p Manual disimpaction by GI team  , GI recommend -miralax TID, senna 17.2 QHS, BID SMOG enemas as needed  - pt moving bowels, abdominal pain resolved , tolerating diet

## 2022-06-25 NOTE — PROGRESS NOTE ADULT - SUBJECTIVE AND OBJECTIVE BOX
Patient is a 86y old  Male who presents with a chief complaint of abdominal pain (24 Jun 2022 15:46)      SUBJECTIVE / OVERNIGHT EVENTS:    MEDICATIONS  (STANDING):  acetylcysteine 20%  Inhalation 1 milliLiter(s) Inhalation every 6 hours  aspirin  chewable 81 milliGRAM(s) Oral daily  bisacodyl 5 milliGRAM(s) Oral at bedtime  bisacodyl Suppository 10 milliGRAM(s) Rectal daily  cholecalciferol 1000 Unit(s) Oral daily  dextrose 5%. 1000 milliLiter(s) (50 mL/Hr) IV Continuous <Continuous>  dextrose 5%. 1000 milliLiter(s) (100 mL/Hr) IV Continuous <Continuous>  dextrose 50% Injectable 25 Gram(s) IV Push once  dextrose 50% Injectable 12.5 Gram(s) IV Push once  dextrose 50% Injectable 25 Gram(s) IV Push once  diltiazem    milliGRAM(s) Oral daily  fluconAZOLE   Tablet 100 milliGRAM(s) Oral daily  glucagon  Injectable 1 milliGRAM(s) IntraMuscular once  heparin   Injectable 5000 Unit(s) SubCutaneous every 12 hours  hydrocortisone hemorrhoidal Suppository 1 Suppository(s) Rectal two times a day  insulin lispro (ADMELOG) corrective regimen sliding scale   SubCutaneous three times a day before meals  insulin lispro (ADMELOG) corrective regimen sliding scale   SubCutaneous at bedtime  insulin lispro Injectable (ADMELOG) 2 Unit(s) SubCutaneous three times a day before meals  ipratropium    for Nebulization 500 MICROGram(s) Nebulizer every 6 hours  pantoprazole  Injectable 40 milliGRAM(s) IV Push daily  polyethylene glycol 3350 17 Gram(s) Oral <User Schedule>  predniSONE   Tablet 30 milliGRAM(s) Oral daily  senna 2 Tablet(s) Oral at bedtime  sertraline 25 milliGRAM(s) Oral daily  silver sulfADIAZINE 1% Cream 1 Application(s) Topical daily  simethicone 80 milliGRAM(s) Chew four times a day  sodium chloride 0.9%. 1000 milliLiter(s) (60 mL/Hr) IV Continuous <Continuous>    MEDICATIONS  (PRN):  acetaminophen     Tablet .. 650 milliGRAM(s) Oral every 6 hours PRN Severe Pain (7 - 10)  aluminum hydroxide/magnesium hydroxide/simethicone Suspension 30 milliLiter(s) Oral every 6 hours PRN Dyspepsia  dextrose Oral Gel 15 Gram(s) Oral once PRN Blood Glucose LESS THAN 70 milliGRAM(s)/deciliter      Vital Signs Last 24 Hrs  T(C): 36.4 (25 Jun 2022 05:30), Max: 36.7 (24 Jun 2022 11:00)  T(F): 97.5 (25 Jun 2022 05:30), Max: 98.1 (24 Jun 2022 11:00)  HR: 90 (25 Jun 2022 05:30) (77 - 91)  BP: 148/87 (25 Jun 2022 05:30) (127/75 - 148/87)  BP(mean): --  RR: 18 (25 Jun 2022 05:30) (18 - 18)  SpO2: 100% (25 Jun 2022 05:30) (96% - 100%)  CAPILLARY BLOOD GLUCOSE      POCT Blood Glucose.: 111 mg/dL (24 Jun 2022 22:09)  POCT Blood Glucose.: 233 mg/dL (24 Jun 2022 17:26)  POCT Blood Glucose.: 316 mg/dL (24 Jun 2022 12:12)  POCT Blood Glucose.: 143 mg/dL (24 Jun 2022 08:38)    I&O's Summary    24 Jun 2022 07:01  -  25 Jun 2022 07:00  --------------------------------------------------------  IN: 0 mL / OUT: 500 mL / NET: -500 mL        PHYSICAL EXAM:  GENERAL: NAD, well-developed  HEAD:  Atraumatic, Normocephalic  EYES: EOMI, PERRLA, conjunctiva and sclera clear  NECK: Supple, No JVD  CHEST/LUNG: Clear to auscultation bilaterally; No wheeze  HEART: Regular rate and rhythm; No murmurs, rubs, or gallops  ABDOMEN: Soft, Nontender, Nondistended; Bowel sounds present  EXTREMITIES:  2+ Peripheral Pulses, No clubbing, cyanosis, or edema  PSYCH: AAOx3  NEUROLOGY: non-focal  SKIN: No rashes or lesions    LABS:                        11.0   x     )-----------( 228      ( 25 Jun 2022 06:15 )             34.1     06-25    135  |  98  |  15  ----------------------------<  147<H>  3.7   |  30  |  0.52    Ca    8.6      25 Jun 2022 06:15  Phos  2.7     06-25  Mg     1.90     06-25    TPro  5.7<L>  /  Alb  2.7<L>  /  TBili  0.3  /  DBili  x   /  AST  19  /  ALT  29  /  AlkPhos  137<H>  06-25              RADIOLOGY & ADDITIONAL TESTS:    Imaging Personally Reviewed:    Consultant(s) Notes Reviewed:      Care Discussed with Consultants/Other Providers:   Patient is a 86y old  Male who presents with a chief complaint of abdominal pain (24 Jun 2022 15:46)      SUBJECTIVE / OVERNIGHT EVENTS: patient seen and examined by bedside, abdominal pain resolved, pt moving bowels, stools blood tinged, denies headache, dizziness, SOB, CP, Palpitations , N/V/D,   pt tolerating diet       MEDICATIONS  (STANDING):  acetylcysteine 20%  Inhalation 1 milliLiter(s) Inhalation every 6 hours  aspirin  chewable 81 milliGRAM(s) Oral daily  bisacodyl 5 milliGRAM(s) Oral at bedtime  bisacodyl Suppository 10 milliGRAM(s) Rectal daily  cholecalciferol 1000 Unit(s) Oral daily  dextrose 5%. 1000 milliLiter(s) (50 mL/Hr) IV Continuous <Continuous>  dextrose 5%. 1000 milliLiter(s) (100 mL/Hr) IV Continuous <Continuous>  dextrose 50% Injectable 25 Gram(s) IV Push once  dextrose 50% Injectable 12.5 Gram(s) IV Push once  dextrose 50% Injectable 25 Gram(s) IV Push once  diltiazem    milliGRAM(s) Oral daily  fluconAZOLE   Tablet 100 milliGRAM(s) Oral daily  glucagon  Injectable 1 milliGRAM(s) IntraMuscular once  heparin   Injectable 5000 Unit(s) SubCutaneous every 12 hours  hydrocortisone hemorrhoidal Suppository 1 Suppository(s) Rectal two times a day  insulin lispro (ADMELOG) corrective regimen sliding scale   SubCutaneous three times a day before meals  insulin lispro (ADMELOG) corrective regimen sliding scale   SubCutaneous at bedtime  insulin lispro Injectable (ADMELOG) 2 Unit(s) SubCutaneous three times a day before meals  ipratropium    for Nebulization 500 MICROGram(s) Nebulizer every 6 hours  pantoprazole  Injectable 40 milliGRAM(s) IV Push daily  polyethylene glycol 3350 17 Gram(s) Oral <User Schedule>  predniSONE   Tablet 30 milliGRAM(s) Oral daily  senna 2 Tablet(s) Oral at bedtime  sertraline 25 milliGRAM(s) Oral daily  silver sulfADIAZINE 1% Cream 1 Application(s) Topical daily  simethicone 80 milliGRAM(s) Chew four times a day  sodium chloride 0.9%. 1000 milliLiter(s) (60 mL/Hr) IV Continuous <Continuous>    MEDICATIONS  (PRN):  acetaminophen     Tablet .. 650 milliGRAM(s) Oral every 6 hours PRN Severe Pain (7 - 10)  aluminum hydroxide/magnesium hydroxide/simethicone Suspension 30 milliLiter(s) Oral every 6 hours PRN Dyspepsia  dextrose Oral Gel 15 Gram(s) Oral once PRN Blood Glucose LESS THAN 70 milliGRAM(s)/deciliter      Vital Signs Last 24 Hrs  T(C): 36.4 (25 Jun 2022 05:30), Max: 36.7 (24 Jun 2022 11:00)  T(F): 97.5 (25 Jun 2022 05:30), Max: 98.1 (24 Jun 2022 11:00)  HR: 90 (25 Jun 2022 05:30) (77 - 91)  BP: 148/87 (25 Jun 2022 05:30) (127/75 - 148/87)  BP(mean): --  RR: 18 (25 Jun 2022 05:30) (18 - 18)  SpO2: 100% (25 Jun 2022 05:30) (96% - 100%)  CAPILLARY BLOOD GLUCOSE      POCT Blood Glucose.: 111 mg/dL (24 Jun 2022 22:09)  POCT Blood Glucose.: 233 mg/dL (24 Jun 2022 17:26)  POCT Blood Glucose.: 316 mg/dL (24 Jun 2022 12:12)  POCT Blood Glucose.: 143 mg/dL (24 Jun 2022 08:38)    I&O's Summary    24 Jun 2022 07:01  -  25 Jun 2022 07:00  --------------------------------------------------------  IN: 0 mL / OUT: 500 mL / NET: -500 mL      PHYSICAL EXAM:  GENERAL: NAD, frail, with O2 via NC   HEAD:  Atraumatic, Normocephalic  EYES: EOMI, PERRLA, conjunctiva and sclera clear  CHEST/LUNG:  fine crackles b/l ; No wheeze  HEART: Regular rate and rhythm;   ABDOMEN: Soft, Nontender, Nondistended; Bowel sounds present,   : Varma cath removed   EXTREMITIES:  2+ Peripheral Pulses, No clubbing, cyanosis, or edema  PSYCH: AAOx3  NEUROLOGY: non-focal  SKIN: mild redness in sacrum and buttock         LABS:                             11.0   9.44  )-----------( 228      ( 25 Jun 2022 06:15 )             34.1     06-25    135  |  98  |  15  ----------------------------<  147<H>  3.7   |  30  |  0.52    Ca    8.6      25 Jun 2022 06:15  Phos  2.7     06-25  Mg     1.90     06-25    TPro  5.7<L>  /  Alb  2.7<L>  /  TBili  0.3  /  DBili  x   /  AST  19  /  ALT  29  /  AlkPhos  137<H>  06-25              RADIOLOGY & ADDITIONAL TESTS:    Imaging Personally Reviewed:    Consultant(s) Notes Reviewed:      Care Discussed with Consultants/Other Providers:

## 2022-06-25 NOTE — PROGRESS NOTE ADULT - PROBLEM SELECTOR PLAN 10
- DVT ppx: Patient on Eliquis 2.5 mg PO BID at baseline for chronic AFib so no ppx needed  PT eval  -plan of care d/w pt and daughter at bedside  GOC again discussed with daughter, wants to d/w other siblings   case d/w ACp - DVT ppx: Patient on Eliquis 2.5 mg PO BID at baseline for chronic AFib so no ppx needed  PT eval  -plan of care d/w pt and daughter at bedside  GOC again discussed with daughter, wants to d/w other siblings , MOLST form given to daughter to  d/w patient and other family members   case d/w ACp

## 2022-06-25 NOTE — PROGRESS NOTE ADULT - PROBLEM SELECTOR PLAN 3
- Patient with history of MDD, on Sertraline 25 mg PO daily, also on Q15 minute safety checks at NH for SI  -pt denies SI at this time   -psych eval appreciated   -  no need for 1:1 Observation as per psych   - Continue Sertraline 25mg daily  -No contraindication to discharge as per Pscyh

## 2022-06-26 NOTE — CONSULT NOTE ADULT - SUBJECTIVE AND OBJECTIVE BOX
Abhijit Freed MD  Interventional Cardiology / Advance Heart Failure and Cardiac Transplant Specialist  [place] Office : [phone] [place] [address] [place] [address]  Tel:   [place] Office : [phone] [facility] [place] [address]  Tel: [phone]    HISTORY OF PRESENTING ILLNESS:  86 year old Male with a PMHx of pulmonary fibrosis on chronic prednisone and home O2 4L NC, T2DM, HTN, AFib (on Eliquis), GERD, dysphagia (on ground diet and thin liquid diet at NH), protein-calorie malnutrition, and MDD (Q15 minute safety checks at NH for SI), chronic constipation, recent oral thrush, who presented to the ED with abdominal pain. Cardiology consulted for evaluation of Afib. Patient recently discharged from [facility] on eliquis new for ?afib. As per nursing home records in chart patient was on eliquis. Daughter states patient was not on eliquis prior to hospitalization in [facility]. Patient noted with some blood in stool and daughter would like patient to be off eliquis until further evaluation. EKG and tele shows NSR.       MEDICATIONS:  aspirin  chewable 81 milliGRAM(s) Oral daily  diltiazem    milliGRAM(s) Oral daily  heparin   Injectable 5000 Unit(s) SubCutaneous every 12 hours    fluconAZOLE   Tablet 100 milliGRAM(s) Oral daily    acetylcysteine 20%  Inhalation 1 milliLiter(s) Inhalation every 6 hours  ipratropium    for Nebulization 500 MICROGram(s) Nebulizer every 6 hours    acetaminophen     Tablet .. 650 milliGRAM(s) Oral every 6 hours PRN  sertraline 25 milliGRAM(s) Oral daily    aluminum hydroxide/magnesium hydroxide/simethicone Suspension 30 milliLiter(s) Oral every 6 hours PRN  bisacodyl 5 milliGRAM(s) Oral at bedtime  bisacodyl Suppository 10 milliGRAM(s) Rectal daily  pantoprazole  Injectable 40 milliGRAM(s) IV Push daily  polyethylene glycol 3350 17 Gram(s) Oral <User Schedule>  senna 2 Tablet(s) Oral at bedtime  simethicone 80 milliGRAM(s) Chew four times a day    dextrose 50% Injectable 25 Gram(s) IV Push once  dextrose 50% Injectable 12.5 Gram(s) IV Push once  dextrose 50% Injectable 25 Gram(s) IV Push once  dextrose Oral Gel 15 Gram(s) Oral once PRN  glucagon  Injectable 1 milliGRAM(s) IntraMuscular once  insulin lispro (ADMELOG) corrective regimen sliding scale   SubCutaneous three times a day before meals  insulin lispro (ADMELOG) corrective regimen sliding scale   SubCutaneous at bedtime  insulin lispro Injectable (ADMELOG) 2 Unit(s) SubCutaneous three times a day before meals  predniSONE   Tablet 30 milliGRAM(s) Oral daily    cholecalciferol 1000 Unit(s) Oral daily  dextrose 5%. 1000 milliLiter(s) IV Continuous <Continuous>  dextrose 5%. 1000 milliLiter(s) IV Continuous <Continuous>  hydrocortisone hemorrhoidal Suppository 1 Suppository(s) Rectal two times a day  potassium phosphate / sodium phosphate Powder (PHOS-NaK) 1 Packet(s) Oral once  silver sulfADIAZINE 1% Cream 1 Application(s) Topical daily  sodium chloride 0.9%. 1000 milliLiter(s) IV Continuous <Continuous>      PAST MEDICAL/SURGICAL HISTORY  PAST MEDICAL & SURGICAL HISTORY:  DM (diabetes mellitus)      Pulmonary fibrosis  on home O2 and daily prednisone      HTN (hypertension)      Chronic atrial fibrillation      Dysphagia  recent cadida infection, s/p nystatin swish and swallow      GERD (gastroesophageal reflux disease)      Protein calorie malnutrition      MDD (major depressive disorder)      Constipation      S/P hernia repair      H/O cataract extraction      History of prostate surgery  prostate was &quot;scraped&quot; 2021          SOCIAL HISTORY: Substance Use (street drugs): ( x ) never used  (  ) other:    FAMILY HISTORY:  No pertinent family history in first degree relatives        REVIEW OF SYSTEMS:  CONSTITUTIONAL: No fever, weight loss, or fatigue  EYES: No eye pain, visual disturbances, or discharge  ENMT:  No difficulty hearing, tinnitus, vertigo; No sinus or throat pain  BREASTS: No pain, masses, or nipple discharge  GASTROINTESTINAL: No abdominal or epigastric pain. No nausea, vomiting, or hematemesis; No diarrhea or constipation. No melena or hematochezia.  GENITOURINARY: No dysuria, frequency, hematuria, or incontinence  NEUROLOGICAL: No headaches, memory loss, loss of strength, numbness, or tremors  ENDOCRINE: No heat or cold intolerance; No hair loss  MUSCULOSKELETAL: No joint pain or swelling; No muscle, back, or extremity pain  PSYCHIATRIC: No depression, anxiety, mood swings, or difficulty sleeping  HEME/LYMPH: No easy bruising, or bleeding gums  All others negative    PHYSICAL EXAM:  T(C): 36.4 (06-26-22 @ 05:40), Max: 36.7 (06-25-22 @ 16:59)  HR: 81 (06-26-22 @ 10:37) (77 - 82)  BP: 136/76 (06-26-22 @ 05:40) (110/79 - 136/76)  RR: 20 (06-26-22 @ 05:40) (18 - 20)  SpO2: 98% (06-26-22 @ 10:37) (95% - 100%)  Wt(kg): --  I&O's Summary    25 Jun 2022 07:01  -  26 Jun 2022 07:00  --------------------------------------------------------  IN: 0 mL / OUT: 200 mL / NET: -200 mL          GENERAL: NAD  EYES: EOMI, PERRLA, conjunctiva and sclera clear  ENMT: No tonsillar erythema, exudates, or enlargement  Cardiovascular: Normal S1 S2, No JVD, No murmurs, No edema  Respiratory: Lungs clear to auscultation	  Gastrointestinal:  Soft, Non-tender, + BS	  Extremities: No edema                                    11.6   10.23 )-----------( 230      ( 26 Jun 2022 05:06 )             35.3     06-26    136  |  95<L>  |  12  ----------------------------<  83  3.7   |  31  |  0.48<L>    Ca    8.7      26 Jun 2022 05:06  Phos  2.3     06-26  Mg     1.90     06-26    TPro  5.7<L>  /  Alb  2.7<L>  /  TBili  0.3  /  DBili  x   /  AST  19  /  ALT  29  /  AlkPhos  137<H>  06-25    proBNP:   Lipid Profile:   HgA1c:   TSH:     Consultant(s) Notes Reviewed:  [x ] YES  [ ] NO    Care Discussed with Consultants/Other Providers [ x] YES  [ ] NO    Imaging Personally Reviewed independently:  [x] YES  [ ] NO    All labs, radiologic studies, vitals, orders and medications list reviewed. Patient is seen and examined at bedside. Case discussed with medical team.

## 2022-06-26 NOTE — PROGRESS NOTE ADULT - PROBLEM SELECTOR PLAN 10
- DVT ppx: Patient on Eliquis 2.5 mg PO BID at baseline for chronic AFib so no ppx needed  PT eval  -plan of care d/w pt and daughter at bedside  GOC again discussed with daughter, wants to d/w other siblings , MOLST form given to daughter to  d/w patient and other family members   case d/w ACp - DVT ppx: Patient on Eliquis 2.5 mg PO BID at baseline for chronic AFib so no ppx needed  PT eval  GOC again discussed with daughter, wants to d/w other siblings , MOLST form given to daughter to  d/w patient and other family members on 6/25   case d/w pt and  ACp

## 2022-06-26 NOTE — PROGRESS NOTE ADULT - PROBLEM SELECTOR PLAN 2
- UA: moderate leuk esterase, 13 WBCs, few bacteria  - CT A/P: Patchy areas of decreased enhancement in both kidneys which raises concern for pyelonephritis.  - Patient afebrile, no leukocytosis  - Patient received and tolerated 2 g Ceftriaxone in the ED  - Varma catheter placed in the ED, TOV in Progress   - Urine culture sent, F/U results  - Low suspicion for acute pyelonephritis as source of symptoms, given patient recently completed 7d course of Zosyn for presumed PNA per daughter yesterday, will deescalate  ABX if cx negative and patient improving  - pt s/p Successful TOV, leucocytosis resolved, will dc Abx - UA: moderate leuk esterase, 13 WBCs, few bacteria  - CT A/P: Patchy areas of decreased enhancement in both kidneys which raises concern for pyelonephritis.  - Patient afebrile, no leukocytosis  - Patient received and tolerated 2 g Ceftriaxone in the ED  - Varma catheter placed in the ED, TOV in Progress   - Urine culture sent, F/U results  - Low suspicion for acute pyelonephritis as source of symptoms, given patient recently completed 7d course of Zosyn for presumed PNA per daughter yesterday, will deescalate  ABX if cx negative and patient improving  - pt s/p Successful TOV, leucocytosis resolved, dced  Abx

## 2022-06-26 NOTE — PROGRESS NOTE ADULT - SUBJECTIVE AND OBJECTIVE BOX
History: Pt seen at bedside. Pt was sleeping at time of visit. Daughter at bedside Beti [phone]. Pt with fair appetite. Ate breakfast and lunch today. As per daughter and Rn pt has no signs of nausea and vomiting/hypoglycemia.      MEDICATIONS  (STANDING):  acetylcysteine 20%  Inhalation 1 milliLiter(s) Inhalation every 6 hours  apixaban 2.5 milliGRAM(s) Oral every 12 hours  aspirin  chewable 81 milliGRAM(s) Oral daily  bisacodyl 5 milliGRAM(s) Oral at bedtime  bisacodyl Suppository 10 milliGRAM(s) Rectal daily  cefTRIAXone   IVPB 1000 milliGRAM(s) IV Intermittent every 24 hours  dextrose 5%. 1000 milliLiter(s) (50 mL/Hr) IV Continuous <Continuous>  dextrose 5%. 1000 milliLiter(s) (100 mL/Hr) IV Continuous <Continuous>  dextrose 50% Injectable 25 Gram(s) IV Push once  dextrose 50% Injectable 12.5 Gram(s) IV Push once  dextrose 50% Injectable 25 Gram(s) IV Push once  diltiazem    milliGRAM(s) Oral daily  fluconAZOLE   Tablet 100 milliGRAM(s) Oral daily  glucagon  Injectable 1 milliGRAM(s) IntraMuscular once  hydrocortisone hemorrhoidal Suppository 1 Suppository(s) Rectal two times a day  insulin lispro (ADMELOG) corrective regimen sliding scale   SubCutaneous three times a day before meals  insulin lispro (ADMELOG) corrective regimen sliding scale   SubCutaneous at bedtime  insulin lispro Injectable (ADMELOG) 1 Unit(s) SubCutaneous three times a day before meals  ipratropium    for Nebulization 500 MICROGram(s) Nebulizer every 6 hours  pantoprazole  Injectable 40 milliGRAM(s) IV Push daily  polyethylene glycol 3350 17 Gram(s) Oral two times a day  predniSONE   Tablet 30 milliGRAM(s) Oral daily  sertraline 25 milliGRAM(s) Oral daily  silver sulfADIAZINE 1% Cream 1 Application(s) Topical daily  simethicone 80 milliGRAM(s) Chew four times a day  sodium chloride 0.9%. 1000 milliLiter(s) (60 mL/Hr) IV Continuous <Continuous>    MEDICATIONS  (PRN):  acetaminophen     Tablet .. 650 milliGRAM(s) Oral every 6 hours PRN Severe Pain (7 - 10)  aluminum hydroxide/magnesium hydroxide/simethicone Suspension 30 milliLiter(s) Oral every 6 hours PRN Dyspepsia  dextrose Oral Gel 15 Gram(s) Oral once PRN Blood Glucose LESS THAN 70 milliGRAM(s)/deciliter      Allergies: No Known Allergies    Review of Systems:  UNABLE TO OBTAIN    PHYSICAL EXAM:  Vital Signs Last 24 Hrs  T(C): 36.6 (26 Jun 2022 12:08), Max: 36.7 (25 Jun 2022 16:59)  T(F): 97.9 (26 Jun 2022 12:08), Max: 98 (25 Jun 2022 16:59)  HR: 76 (26 Jun 2022 12:08) (76 - 82)  BP: 118/62 (26 Jun 2022 12:08) (110/79 - 136/76)  BP(mean): --  RR: 18 (26 Jun 2022 12:08) (18 - 20)  SpO2: 100% (26 Jun 2022 12:08) (95% - 100%)  GENERAL: NAD  RESPIRATORY: On Oxygen; no labored breathing   GI: Soft, nontender, non distended      CAPILLARY BLOOD GLUCOSE  POCT Blood Glucose.: 275 mg/dL (26 Jun 2022 13:27)  POCT Blood Glucose.: 321 mg/dL (26 Jun 2022 12:17)  POCT Blood Glucose.: 133 mg/dL (26 Jun 2022 08:31)  POCT Blood Glucose.: 288 mg/dL (25 Jun 2022 21:02)  POCT Blood Glucose.: 196 mg/dL (25 Jun 2022 17:45)  POCT Blood Glucose.: 316 mg/dL (24 Jun 2022 12:12)  POCT Blood Glucose.: 143 mg/dL (24 Jun 2022 08:38)  POCT Blood Glucose.: 128 mg/dL (23 Jun 2022 22:24)  POCT Blood Glucose.: 170 mg/dL (23 Jun 2022 17:28)    A1C with Estimated Average Glucose in AM (06.22.22 @ 06:00)    A1C with Estimated Average Glucose Result: 6.2      06-24    135  |  99  |  17  ----------------------------<  147<H>  4.1   |  27  |  0.58    eGFR: 96    Ca    8.9      06-24  Mg     2.00     06-24  Phos  2.3     06-24    TPro  6.0  /  Alb  2.8<L>  /  TBili  0.3  /  DBili  x   /  AST  15  /  ALT  31  /  AlkPhos  145<H>  06-23      Thyroid Function Tests:  06-22 @ 06:00 TSH 1.67 FreeT4 -- T3 -- Anti TPO -- Anti Thyroglobulin Ab -- TSI --      Diet, Minced and Moist:   Consistent Carbohydrate Evening Snack (CSTCHOSN)  Mildly Thick Liquids (MILDTHICKLIQS)  Supplement Feeding Modality:  Oral  Glucerna Shake Cans or Servings Per Day:  1       Frequency:  Three Times a day (06-24-22 @ 14:00) [Active]

## 2022-06-26 NOTE — PROGRESS NOTE ADULT - PROBLEM SELECTOR PLAN 1
- Patient presenting with intermittent abdominal pain x 1 month that acutely worsening over the past 3 days as per daughter; worst when patient is straining to have BM   - CT A/P with IV contrast: Distended gallbladder with minimal gallbladder wall hyperemia. Patchy areas of decreased enhancement in both kidneys which raises concern for pyelonephritis. Large fecal load in the rectum with mild perirectal fatty infiltration which raises question of stercoral colitis.   - Abdomen soft, non-tender, non-distended on exam, there is no peritoneal signs and no RUQ tenderness   - Patient afebrile, now with mild leukocytosis, likely reactive to stercoral colitis   - Alk phos 136, AST & ALT WNL  - low suspicion for cholecystitis based on clinical exam, can hold off on RUQ sono for now  - symptoms likely related to stercoral colitis found on CT scan given LLQ pain   - pt s/p Manual disimpaction by GI team  , GI recommend -miralax TID, senna 17.2 QHS, BID SMOG enemas as needed  - pt moving bowels, abdominal pain resolved , tolerating diet

## 2022-06-26 NOTE — PROGRESS NOTE ADULT - SUBJECTIVE AND OBJECTIVE BOX
Patient is a 86y old  Male who presents with a chief complaint of abdominal pain (25 Jun 2022 07:51)      SUBJECTIVE / OVERNIGHT EVENTS:    MEDICATIONS  (STANDING):  acetylcysteine 20%  Inhalation 1 milliLiter(s) Inhalation every 6 hours  aspirin  chewable 81 milliGRAM(s) Oral daily  bisacodyl 5 milliGRAM(s) Oral at bedtime  bisacodyl Suppository 10 milliGRAM(s) Rectal daily  cholecalciferol 1000 Unit(s) Oral daily  dextrose 5%. 1000 milliLiter(s) (50 mL/Hr) IV Continuous <Continuous>  dextrose 5%. 1000 milliLiter(s) (100 mL/Hr) IV Continuous <Continuous>  dextrose 50% Injectable 25 Gram(s) IV Push once  dextrose 50% Injectable 12.5 Gram(s) IV Push once  dextrose 50% Injectable 25 Gram(s) IV Push once  diltiazem    milliGRAM(s) Oral daily  fluconAZOLE   Tablet 100 milliGRAM(s) Oral daily  glucagon  Injectable 1 milliGRAM(s) IntraMuscular once  heparin   Injectable 5000 Unit(s) SubCutaneous every 12 hours  hydrocortisone hemorrhoidal Suppository 1 Suppository(s) Rectal two times a day  insulin lispro (ADMELOG) corrective regimen sliding scale   SubCutaneous three times a day before meals  insulin lispro (ADMELOG) corrective regimen sliding scale   SubCutaneous at bedtime  insulin lispro Injectable (ADMELOG) 2 Unit(s) SubCutaneous three times a day before meals  ipratropium    for Nebulization 500 MICROGram(s) Nebulizer every 6 hours  pantoprazole  Injectable 40 milliGRAM(s) IV Push daily  polyethylene glycol 3350 17 Gram(s) Oral <User Schedule>  predniSONE   Tablet 30 milliGRAM(s) Oral daily  senna 2 Tablet(s) Oral at bedtime  sertraline 25 milliGRAM(s) Oral daily  silver sulfADIAZINE 1% Cream 1 Application(s) Topical daily  simethicone 80 milliGRAM(s) Chew four times a day  sodium chloride 0.9%. 1000 milliLiter(s) (60 mL/Hr) IV Continuous <Continuous>    MEDICATIONS  (PRN):  acetaminophen     Tablet .. 650 milliGRAM(s) Oral every 6 hours PRN Severe Pain (7 - 10)  aluminum hydroxide/magnesium hydroxide/simethicone Suspension 30 milliLiter(s) Oral every 6 hours PRN Dyspepsia  dextrose Oral Gel 15 Gram(s) Oral once PRN Blood Glucose LESS THAN 70 milliGRAM(s)/deciliter      Vital Signs Last 24 Hrs  T(C): 36.4 (26 Jun 2022 05:40), Max: 36.7 (25 Jun 2022 16:59)  T(F): 97.6 (26 Jun 2022 05:40), Max: 98 (25 Jun 2022 16:59)  HR: 79 (26 Jun 2022 05:40) (74 - 88)  BP: 136/76 (26 Jun 2022 05:40) (110/79 - 136/76)  BP(mean): --  RR: 20 (26 Jun 2022 05:40) (18 - 20)  SpO2: 100% (26 Jun 2022 05:40) (95% - 100%)  CAPILLARY BLOOD GLUCOSE      POCT Blood Glucose.: 288 mg/dL (25 Jun 2022 21:02)  POCT Blood Glucose.: 196 mg/dL (25 Jun 2022 17:45)  POCT Blood Glucose.: 165 mg/dL (25 Jun 2022 11:59)  POCT Blood Glucose.: 206 mg/dL (25 Jun 2022 09:08)    I&O's Summary    25 Jun 2022 07:01  -  26 Jun 2022 07:00  --------------------------------------------------------  IN: 0 mL / OUT: 200 mL / NET: -200 mL        PHYSICAL EXAM:  GENERAL: NAD, well-developed  HEAD:  Atraumatic, Normocephalic  EYES: EOMI, PERRLA, conjunctiva and sclera clear  NECK: Supple, No JVD  CHEST/LUNG: Clear to auscultation bilaterally; No wheeze  HEART: Regular rate and rhythm; No murmurs, rubs, or gallops  ABDOMEN: Soft, Nontender, Nondistended; Bowel sounds present  EXTREMITIES:  2+ Peripheral Pulses, No clubbing, cyanosis, or edema  PSYCH: AAOx3  NEUROLOGY: non-focal  SKIN: No rashes or lesions    LABS:                        11.0   9.44  )-----------( 228      ( 25 Jun 2022 06:15 )             34.1     06-25    135  |  98  |  15  ----------------------------<  147<H>  3.7   |  30  |  0.52    Ca    8.6      25 Jun 2022 06:15  Phos  2.7     06-25  Mg     1.90     06-25    TPro  5.7<L>  /  Alb  2.7<L>  /  TBili  0.3  /  DBili  x   /  AST  19  /  ALT  29  /  AlkPhos  137<H>  06-25              RADIOLOGY & ADDITIONAL TESTS:    Imaging Personally Reviewed:    Consultant(s) Notes Reviewed:      Care Discussed with Consultants/Other Providers:   Patient is a 86y old  Male who presents with a chief complaint of abdominal pain (25 Jun 2022 07:51)      SUBJECTIVE / OVERNIGHT EVENTS: patient seen and examined by bedside, pt feeling better, moving bowels, abdominal pain resolved , denies headache, dizziness, SOB, CP, Palpitations ,  Tele; NSR, PAT, no AFib   MEDICATIONS  (STANDING):  acetylcysteine 20%  Inhalation 1 milliLiter(s) Inhalation every 6 hours  aspirin  chewable 81 milliGRAM(s) Oral daily  bisacodyl 5 milliGRAM(s) Oral at bedtime  bisacodyl Suppository 10 milliGRAM(s) Rectal daily  cholecalciferol 1000 Unit(s) Oral daily  dextrose 5%. 1000 milliLiter(s) (50 mL/Hr) IV Continuous <Continuous>  dextrose 5%. 1000 milliLiter(s) (100 mL/Hr) IV Continuous <Continuous>  dextrose 50% Injectable 25 Gram(s) IV Push once  dextrose 50% Injectable 12.5 Gram(s) IV Push once  dextrose 50% Injectable 25 Gram(s) IV Push once  diltiazem    milliGRAM(s) Oral daily  fluconAZOLE   Tablet 100 milliGRAM(s) Oral daily  glucagon  Injectable 1 milliGRAM(s) IntraMuscular once  heparin   Injectable 5000 Unit(s) SubCutaneous every 12 hours  hydrocortisone hemorrhoidal Suppository 1 Suppository(s) Rectal two times a day  insulin lispro (ADMELOG) corrective regimen sliding scale   SubCutaneous three times a day before meals  insulin lispro (ADMELOG) corrective regimen sliding scale   SubCutaneous at bedtime  insulin lispro Injectable (ADMELOG) 2 Unit(s) SubCutaneous three times a day before meals  ipratropium    for Nebulization 500 MICROGram(s) Nebulizer every 6 hours  pantoprazole  Injectable 40 milliGRAM(s) IV Push daily  polyethylene glycol 3350 17 Gram(s) Oral <User Schedule>  predniSONE   Tablet 30 milliGRAM(s) Oral daily  senna 2 Tablet(s) Oral at bedtime  sertraline 25 milliGRAM(s) Oral daily  silver sulfADIAZINE 1% Cream 1 Application(s) Topical daily  simethicone 80 milliGRAM(s) Chew four times a day  sodium chloride 0.9%. 1000 milliLiter(s) (60 mL/Hr) IV Continuous <Continuous>    MEDICATIONS  (PRN):  acetaminophen     Tablet .. 650 milliGRAM(s) Oral every 6 hours PRN Severe Pain (7 - 10)  aluminum hydroxide/magnesium hydroxide/simethicone Suspension 30 milliLiter(s) Oral every 6 hours PRN Dyspepsia  dextrose Oral Gel 15 Gram(s) Oral once PRN Blood Glucose LESS THAN 70 milliGRAM(s)/deciliter      Vital Signs Last 24 Hrs  T(C): 36.4 (26 Jun 2022 05:40), Max: 36.7 (25 Jun 2022 16:59)  T(F): 97.6 (26 Jun 2022 05:40), Max: 98 (25 Jun 2022 16:59)  HR: 79 (26 Jun 2022 05:40) (74 - 88)  BP: 136/76 (26 Jun 2022 05:40) (110/79 - 136/76)  BP(mean): --  RR: 20 (26 Jun 2022 05:40) (18 - 20)  SpO2: 100% (26 Jun 2022 05:40) (95% - 100%)  CAPILLARY BLOOD GLUCOSE      POCT Blood Glucose.: 288 mg/dL (25 Jun 2022 21:02)  POCT Blood Glucose.: 196 mg/dL (25 Jun 2022 17:45)  POCT Blood Glucose.: 165 mg/dL (25 Jun 2022 11:59)  POCT Blood Glucose.: 206 mg/dL (25 Jun 2022 09:08)    I&O's Summary    25 Jun 2022 07:01  -  26 Jun 2022 07:00  --------------------------------------------------------  IN: 0 mL / OUT: 200 mL / NET: -200 mL      PHYSICAL EXAM:  GENERAL: NAD, frail, with O2 via NC   HEAD:  Atraumatic, Normocephalic  EYES: EOMI, PERRLA, conjunctiva and sclera clear  CHEST/LUNG:  fine crackles b/l ; No wheeze  HEART: Regular rate and rhythm;   ABDOMEN: Soft, Nontender, Nondistended; Bowel sounds present,   : Varma cath removed   EXTREMITIES:  2+ Peripheral Pulses, No clubbing, cyanosis, or edema  PSYCH: AAOx3  NEUROLOGY: non-focal  SKIN: mild redness in sacrum and buttock             LABS:                        11.0   9.44  )-----------( 228      ( 25 Jun 2022 06:15 )             34.1     06-25    135  |  98  |  15  ----------------------------<  147<H>  3.7   |  30  |  0.52    Ca    8.6      25 Jun 2022 06:15  Phos  2.7     06-25  Mg     1.90     06-25    TPro  5.7<L>  /  Alb  2.7<L>  /  TBili  0.3  /  DBili  x   /  AST  19  /  ALT  29  /  AlkPhos  137<H>  06-25              RADIOLOGY & ADDITIONAL TESTS:    Imaging Personally Reviewed:    Consultant(s) Notes Reviewed:  cardiology     Care Discussed with Consultants/Other Providers: cardiology

## 2022-06-26 NOTE — PROGRESS NOTE ADULT - PROBLEM SELECTOR PLAN 9
- Patient with stage I pressure ulcer to L buttock present on arrival  - wound care with silvadene cream continue as ordered in NH  -Skin care as per protocol - pt has mild redness, no stage 1 pressure ulcer   -Skin care as per protocol

## 2022-06-26 NOTE — CONSULT NOTE ADULT - ASSESSMENT
86 year old Male with a PMHx of pulmonary fibrosis on chronic prednisone and home O2 4L NC, T2DM, HTN, AFib (on Eliquis), GERD, dysphagia (on ground diet and thin liquid diet at NH), protein-calorie malnutrition, and MDD (Q15 minute safety checks at NH for SI), chronic constipation, recent oral thrush, who presented to the ED with abdominal pain. Cardiology consulted for evaluation of Afib.    EKG: NSR no acute changes  Tele: NSR    1. Afib  -recent diagnosis at [facility]? started on eliquis currently on hold for blood in stool  -continue to monitor on tele   -please obtain records from [facility] tomorrow    2. Abd pain  -t/t per primary team     3. HTN  -controlled  -c/w cardizem  -continue to monitor BP     4. DVT prophylaxis  -hep subq

## 2022-06-26 NOTE — PROGRESS NOTE ADULT - PROBLEM SELECTOR PLAN 6
as per daughter, pt has no Afib, refusing Eliquis. As per daughter , pt was recently in [facility] and he was sick , and some irregular heart rhythm and was started on eliquis  - d/w daughter , pt may have paroxysmal Afib , which also can cause CVA , will repeat EKG, transfer to Telemetry to evaluate for paroxsymal Afib,  cardiology eval also requested   - HR on exam is regular   - EKG noted- NSR at 82 min   - daughter refusing  Eliquis until further w/u   -c/w  Diltiazem at home doses, as per daughter, pt has no Afib, refusing Eliquis. As per daughter , pt was recently in [facility] and he was sick , and some irregular heart rhythm and was started on eliquis  - d/w daughter , pt may have paroxysmal Afib , which also can cause CVA , , transferred  to Telemetry to evaluate for paroxsymal Afib, no Afib on telemonitor  cardiology eval appreciated, recommend to obtain records from [facility]   - HR on exam is regular   - EKG noted- NSR at 82 min   - daughter refusing  Eliquis until further w/u   -c/w  Diltiazem at home doses,

## 2022-06-26 NOTE — PROGRESS NOTE ADULT - ASSESSMENT
Patient is an 86 year old Male with a PMHx of pulmonary fibrosis on chronic prednisone and home O2 4L NC, T2DM, HTN, AFib (on Eliquis), GERD, dysphagia (on ground diet and thin liquid diet at NH), protein-calorie malnutrition, and MDD (Q15 minute safety checks at NH for SI), chronic constipation, recent oral thrush, who presented to the ED with abdominal pain.  found to have hypoglycemia at admission    1. 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  HbA1c 6.2%  hypoglycemia on admission related to receiving Lantus 16 units qhs at rehab    While inpatient:  BG target 100-180 mg/dl  Glucose above goal; pts appetite variable; daughter at bedside feeding pt   Increase Admelog to 3 units premeal (please hold if npo/not eating)  Continue Admelog low correction scale TID AC and seperate order for Admelog low correction scale at bedtime  FS before meals and at bedtime   Consistent Carbohydrate diet   Hypoglycemia Protocol  Please consider switching dextrose solution in antibiotics to sodium chloride      Discharge Plan:  If continuing prednisone 30mg daily without taper please start repaglinide 0.5mg premeal TID (please hold if skips meals)  Patient's daughter prefers to avoid insulin for dc.  Ensure patient has working glucometer, test strips, lancets, alcohol pads, and BD jose luis pen needles  Please also prescribe glucose tabs, Baqsimi nasal spray or glucagon emergency kit for hypoglycemia risk   Please follow up with opthalmology and podiatry as an outpt  Can follow up with PCP or if needed, [facility] endocrinology [phone]    2. long term use of steroids   please note, pt is on long term steroids -  currently on prednisone 30mg daily as per daughter this was started 1 month ago; d/w primary team planning on continuing without taper at this time   Do not abruptly stop steroid risk for secondary/tertiary adrenal insufficiency      3. long steroid use can lead to osteoporosis   check vitamin D level: 29.9 decreased  Consider starting vitamin d 1000 iu daily   outpt DXA     Marion Estes  Nurse Practitioner  Division of Endocrinology & Diabetes  Pager # [phone]      If after 6PM or before 9AM, or on weekends/holidays, please call endocrine answering service for assistance ([phone]).  For nonurgent matters email [email or web address] for assistance.

## 2022-06-27 NOTE — PROGRESS NOTE ADULT - PROBLEM SELECTOR PLAN 1
- Patient presenting with intermittent abdominal pain x 1 month that acutely worsening over the past 3 days as per daughter; worst when patient is straining to have BM   - CT A/P with IV contrast: Distended gallbladder with minimal gallbladder wall hyperemia. Patchy areas of decreased enhancement in both kidneys which raises concern for pyelonephritis. Large fecal load in the rectum with mild perirectal fatty infiltration which raises question of stercoral colitis.   - Abdomen soft, non-tender, non-distended on exam, there is no peritoneal signs and no RUQ tenderness   - Patient afebrile, now with mild leukocytosis, likely reactive to stercoral colitis   - Alk phos 136, AST & ALT WNL  - low suspicion for cholecystitis based on clinical exam, can hold off on RUQ sono for now  - symptoms likely related to stercoral colitis found on CT scan given LLQ pain   - pt s/p Manual disimpaction by GI team  , GI recommend -miralax TID, senna 17.2 QHS, BID SMOG enemas as needed  - pt moving bowels, abdominal pain resolved , tolerating diet  - Also noted to have rectal bleeding earlier, which has not recurred. Suspect 2' constipation. No further diagnostics planned. Will re-evaluate plan for anticoagulation. -Resolved.  - CT A/P with IV contrast: Distended gallbladder with minimal gallbladder wall hyperemia. Patchy areas of decreased enhancement in both kidneys which raises concern for pyelonephritis. Large fecal load in the rectum with mild perirectal fatty infiltration which raises question of stercoral colitis. Benign abd exam.  - pt s/p Manual disimpaction by GI team  , GI recommend -miralax TID, senna 17.2 QHS, BID SMOG enemas as needed  - pt moving bowels, abdominal pain resolved , tolerating diet  - Also noted to have rectal bleeding earlier, which has not recurred. Suspect 2' constipation. No further diagnostics planned. Will re-evaluate plan for anticoagulation.

## 2022-06-27 NOTE — PROGRESS NOTE ADULT - PROBLEM SELECTOR PLAN 2
- UA: moderate leuk esterase, 13 WBCs, few bacteria  - CT A/P: Patchy areas of decreased enhancement in both kidneys which raises concern for pyelonephritis.  - Ucx w/ normal ortiz. Low suspicion for acute pyelonephritis as source of symptoms, given patient recently completed 7d course of Zosyn for presumed PNA. No urinary sx.   - S/p successful TOV.   -Continue to monitor off abx.  -Leukocytosis likely 2' steroids.

## 2022-06-27 NOTE — PROGRESS NOTE ADULT - SUBJECTIVE AND OBJECTIVE BOX
Abhijit Freed MD  Interventional Cardiology / Advance Heart Failure and Cardiac Transplant Specialist  [place] Office : [address] [place] [address] [place] [address]  Tel:   [place] Office : [address] [facility] [place] [address]  Tel: [phone]      Subjective/Overnight events: Pt is lying in bed comfortable not in distress  	  MEDICATIONS:  aspirin  chewable 81 milliGRAM(s) Oral daily  diltiazem    milliGRAM(s) Oral daily  heparin   Injectable 5000 Unit(s) SubCutaneous every 12 hours    fluconAZOLE   Tablet 100 milliGRAM(s) Oral daily    acetylcysteine 20%  Inhalation 1 milliLiter(s) Inhalation every 6 hours  ipratropium    for Nebulization 500 MICROGram(s) Nebulizer every 6 hours    acetaminophen     Tablet .. 650 milliGRAM(s) Oral every 6 hours PRN  sertraline 25 milliGRAM(s) Oral daily    aluminum hydroxide/magnesium hydroxide/simethicone Suspension 30 milliLiter(s) Oral every 6 hours PRN  bisacodyl 5 milliGRAM(s) Oral at bedtime  bisacodyl Suppository 10 milliGRAM(s) Rectal daily  pantoprazole  Injectable 40 milliGRAM(s) IV Push daily  polyethylene glycol 3350 17 Gram(s) Oral <User Schedule>  senna 2 Tablet(s) Oral at bedtime    dextrose 50% Injectable 25 Gram(s) IV Push once  dextrose 50% Injectable 12.5 Gram(s) IV Push once  dextrose 50% Injectable 25 Gram(s) IV Push once  dextrose Oral Gel 15 Gram(s) Oral once PRN  glucagon  Injectable 1 milliGRAM(s) IntraMuscular once  insulin lispro (ADMELOG) corrective regimen sliding scale   SubCutaneous at bedtime  insulin lispro (ADMELOG) corrective regimen sliding scale   SubCutaneous three times a day before meals  insulin lispro Injectable (ADMELOG) 3 Unit(s) SubCutaneous three times a day before meals  predniSONE   Tablet 30 milliGRAM(s) Oral daily    cholecalciferol 1000 Unit(s) Oral daily  dextrose 5%. 1000 milliLiter(s) IV Continuous <Continuous>  dextrose 5%. 1000 milliLiter(s) IV Continuous <Continuous>  hydrocortisone hemorrhoidal Suppository 1 Suppository(s) Rectal two times a day  silver sulfADIAZINE 1% Cream 1 Application(s) Topical daily  sodium chloride 0.9%. 1000 milliLiter(s) IV Continuous <Continuous>      PAST MEDICAL/SURGICAL HISTORY  PAST MEDICAL & SURGICAL HISTORY:  DM (diabetes mellitus)      Pulmonary fibrosis  on home O2 and daily prednisone      HTN (hypertension)      Chronic atrial fibrillation      Dysphagia  recent cadida infection, s/p nystatin swish and swallow      GERD (gastroesophageal reflux disease)      Protein calorie malnutrition      MDD (major depressive disorder)      Constipation      S/P hernia repair      H/O cataract extraction      History of prostate surgery  prostate was &quot;scraped&quot; 2021          SOCIAL HISTORY: Substance Use (street drugs): ( x ) never used  (  ) other:    FAMILY HISTORY:  No pertinent family history in first degree relatives          PHYSICAL EXAM:  T(C): 36.8 (06-27-22 @ 05:45), Max: 37.3 (06-26-22 @ 21:45)  HR: 88 (06-27-22 @ 09:17) (71 - 91)  BP: 145/62 (06-27-22 @ 05:45) (116/69 - 145/62)  RR: 18 (06-27-22 @ 05:45) (18 - 18)  SpO2: 98% (06-27-22 @ 09:17) (97% - 100%)  Wt(kg): --  I&O's Summary    26 Jun 2022 07:01  -  27 Jun 2022 07:00  --------------------------------------------------------  IN: 817 mL / OUT: 1200 mL / NET: -383 mL          GENERAL: NAD  EYES: EOMI, PERRLA, conjunctiva and sclera clear  ENMT: No tonsillar erythema, exudates, or enlargement  Cardiovascular: Normal S1 S2, No JVD, No murmurs, No edema  Respiratory: Lungs clear to auscultation	  Gastrointestinal:  Soft, Non-tender, + BS	  Extremities: No edema                              11.6   11.16 )-----------( 231      ( 27 Jun 2022 07:09 )             36.5     06-27    136  |  96<L>  |  15  ----------------------------<  171<H>  4.1   |  32<H>  |  0.54    Ca    9.0      27 Jun 2022 07:09  Phos  2.4     06-27  Mg     2.10     06-27      proBNP:   Lipid Profile:   HgA1c:   TSH:     Consultant(s) Notes Reviewed:  [x ] YES  [ ] NO    Care Discussed with Consultants/Other Providers [ x] YES  [ ] NO    Imaging Personally Reviewed independently:  [x] YES  [ ] NO    All labs, radiologic studies, vitals, orders and medications list reviewed. Patient is seen and examined at bedside. Case discussed with medical team.

## 2022-06-27 NOTE — PROGRESS NOTE ADULT - PROBLEM SELECTOR PLAN 6
as per daughter, pt has no Afib, refusing Eliquis. As per daughter , pt was recently in [facility] and he was sick , and some irregular heart rhythm and was started on eliquis  - d/w daughter , pt may have paroxysmal Afib , which also can cause CVA , , transferred  to Telemetry to evaluate for paroxsymal Afib, no Afib on telemonitor  cardiology eval appreciated.   - Tele continues to show SR.  - Attempted to contact PCP - Dr. Merry Perez ([phone]) - she is not aware of pt and directed me to Facility NP. Spoke w/ Np fogal [phone]. Per collateral - pt reportedly had new onset AFIB w/ RVR so started on cardizem/Eliquis at the time.  Pt was DC from [facility] on 6/9. I requested records to be sent to me. Will await records. as per daughter, pt has no Afib, refusing Eliquis. As per daughter , pt was recently in [facility] and he was sick , and some irregular heart rhythm and was started on eliquis  - d/w daughter , pt may have paroxysmal Afib , which also can cause CVA , , transferred  to Telemetry to evaluate for paroxsymal Afib, no Afib on telemonitor  cardiology eval appreciated.   - Tele continues to show SR.  - Attempted to contact PCP - Dr. Merry Perez ([phone]) - she is not aware of pt and directed me to Facility NP. Spoke w/ Np fogal [phone]. Per collateral - pt reportedly had new onset AFIB w/ RVR so started on cardizem/Eliquis at the time.  Pt was DC from [facility] on 6/9. I requested records to be sent to me. Will await records.  -Fam refusing AC at this time. Will further discuss once records obtained. - Tele continues to show SR.   - AC held as per daughter (based on prev hospitalist conversation w/ fam). Now that we have confirmation of AF, would readdress restarting AC. No new bleeding. Evaluated by GI - deferring to primary team re: AC.  - Spoke w/ Np peace [phone] who covered pt. Per collateral - pt reportedly had new onset AFIB w/ RVR so started on cardizem/Eliquis at the time.  Records received, will be filed in chart.  - If Eliquis is to be restart - will re-eval need for ASA, To be discussed w/ cards.  - Cont Cardizem.

## 2022-06-27 NOTE — PROGRESS NOTE ADULT - PROBLEM SELECTOR PLAN 5
- Patient with history of T2DM, on Lantus 16 units at bedtime and Humalog sliding scale  - Patient noted to be hypoglycemic to 38 in the ED, given D50 with improvement up to 311. Glycemic control improved since.   -HbA1c 6,2   - Endo f/u appreciated, hypoglycemia likely due to poor po intake along with  insulin use at the rehab (  - Insulin regimen as per endocrine.  - Carb controlled diet. - Patient with history of T2DM, on Lantus 16 units at bedtime and Humalog sliding scale  - Patient noted to be hypoglycemic to 38 in the ED, given D50 with improvement up to 311. Glycemic control improved since.   -HbA1c 6,2   - Endo f/u appreciated, hypoglycemia likely due to poor po intake along with  insulin use at the rehab   - Insulin regimen as per endocrine.  - Carb controlled diet.

## 2022-06-27 NOTE — PROGRESS NOTE ADULT - ASSESSMENT
86 year old Male with a PMHx of pulmonary fibrosis on chronic prednisone and home O2 4L NC, T2DM, HTN, AFib (on Eliquis), GERD, dysphagia (on ground diet and thin liquid diet at NH), protein-calorie malnutrition, and MDD (Q15 minute safety checks at NH for SI), chronic constipation, recent oral thrush, who presented to the ED with abdominal pain. Cardiology consulted for evaluation of Afib.    EKG: NSR no acute changes  Tele: NSR    1. Afib  -recent diagnosis at [facility]? started on eliquis currently on hold for blood in stool  -continue to monitor on tele   -please obtain records from [facility]    2. Abd pain  -t/t per primary team     3. HTN  -controlled  -c/w cardizem  -continue to monitor BP     4. DVT prophylaxis  -hep subq

## 2022-06-27 NOTE — PROGRESS NOTE ADULT - PROBLEM SELECTOR PLAN 8
- Patient with history of severe protein-calorie malnutrition  - Albumin 2.6  - Speech & swallow evaluation  appreciated, recommend Minced and moist with mildly thick liquids   - nutrition evaluation appreciated.

## 2022-06-27 NOTE — PROGRESS NOTE ADULT - SUBJECTIVE AND OBJECTIVE BOX
[facility] Division of Hospital Medicine  Bautista WellingtonArie) MD Vin  Pager [phone]    SUBJECTIVE:  Chief complaint: Abd pain.    Pt seen and evaluated at bedside. Daughter present at bedside.  Pt feels fine. States abd pain improved. No f/c, SOB, CP. Breathing has improved.       ROS: All systems negative except as noted.      Vital Signs Last 24 Hrs  T(C): 36.8 (27 Jun 2022 05:45), Max: 37.3 (26 Jun 2022 21:45)  T(F): 98.3 (27 Jun 2022 05:45), Max: 99.1 (26 Jun 2022 21:45)  HR: 89 (27 Jun 2022 15:52) (71 - 91)  BP: 145/62 (27 Jun 2022 05:45) (116/69 - 145/62)  BP(mean): --  RR: 18 (27 Jun 2022 05:45) (18 - 18)  SpO2: 97% (27 Jun 2022 15:52) (97% - 100%)      PHYSICAL EXAM:  Gen- In bed, comfortable, NAD  Eyes- EOMI, PERRLA, nonicteric.  EMNT- Fair dentition. MMM. No tonsilar exudates. No posterior pharynx erythema.  Neck- Supple. No masses. No tracheal deviation.  Resp- CTAB, good effort. No r/r/w. No accessory muscle use.  CVS- RRR, S1S2, no g/r/m. No LE edema.  GI- Soft abd, NT, ND, +BSx4. No HSM.  MSK- No C/C. ROM intact. No crepitus.  Neuro- CN II-XII intact. Speech fluent/face symmetric. Sensation intact.  Skin- No rashes/ulcers. Warm/moist.  Psych- AAOx3. Appropriate mood/affect.        MEDICATION:  MEDICATIONS  (STANDING):  acetylcysteine 20%  Inhalation 1 milliLiter(s) Inhalation every 6 hours  aspirin  chewable 81 milliGRAM(s) Oral daily  bisacodyl 5 milliGRAM(s) Oral at bedtime  bisacodyl Suppository 10 milliGRAM(s) Rectal daily  cholecalciferol 1000 Unit(s) Oral daily  dextrose 5%. 1000 milliLiter(s) (50 mL/Hr) IV Continuous <Continuous>  dextrose 5%. 1000 milliLiter(s) (100 mL/Hr) IV Continuous <Continuous>  dextrose 50% Injectable 25 Gram(s) IV Push once  dextrose 50% Injectable 12.5 Gram(s) IV Push once  dextrose 50% Injectable 25 Gram(s) IV Push once  diltiazem    milliGRAM(s) Oral daily  fluconAZOLE   Tablet 100 milliGRAM(s) Oral daily  glucagon  Injectable 1 milliGRAM(s) IntraMuscular once  heparin   Injectable 5000 Unit(s) SubCutaneous every 12 hours  hydrocortisone hemorrhoidal Suppository 1 Suppository(s) Rectal two times a day  insulin lispro (ADMELOG) corrective regimen sliding scale   SubCutaneous three times a day before meals  insulin lispro (ADMELOG) corrective regimen sliding scale   SubCutaneous at bedtime  insulin lispro Injectable (ADMELOG) 3 Unit(s) SubCutaneous three times a day before meals  ipratropium    for Nebulization 500 MICROGram(s) Nebulizer every 6 hours  pantoprazole  Injectable 40 milliGRAM(s) IV Push daily  polyethylene glycol 3350 17 Gram(s) Oral <User Schedule>  predniSONE   Tablet 30 milliGRAM(s) Oral daily  senna 2 Tablet(s) Oral at bedtime  sertraline 25 milliGRAM(s) Oral daily  silver sulfADIAZINE 1% Cream 1 Application(s) Topical daily  sodium chloride 0.9%. 1000 milliLiter(s) (60 mL/Hr) IV Continuous <Continuous>    MEDICATIONS  (PRN):  acetaminophen     Tablet .. 650 milliGRAM(s) Oral every 6 hours PRN Severe Pain (7 - 10)  aluminum hydroxide/magnesium hydroxide/simethicone Suspension 30 milliLiter(s) Oral every 6 hours PRN Dyspepsia  dextrose Oral Gel 15 Gram(s) Oral once PRN Blood Glucose LESS THAN 70 milliGRAM(s)/deciliter            LABORATORY:                          11.6   11.16 )-----------( 231      ( 27 Jun 2022 07:09 )             36.5     06-27    136  |  96<L>  |  15  ----------------------------<  171<H>  4.1   |  32<H>  |  0.54    Ca    9.0      27 Jun 2022 07:09  Phos  2.4     06-27  Mg     2.10     06-27                COVID-19 PCR: NotDetec (27 Jun 2022 06:27)

## 2022-06-27 NOTE — PROGRESS NOTE ADULT - PROBLEM SELECTOR PLAN 10
- DVT ppx: Patient on Eliquis 2.5 mg PO BID at baseline for chronic AFib so no ppx needed  PT eval  GOC again discussed with daughter, wants to d/w other siblings , MOLST form given to daughter to  d/w patient and other family members on 6/25   case d/w pt and  ACp - DVT ppx: On Heparin SQ. Pending further clarification w/ family re: DOAC use.     Called [facility]. Reviewed lastest findings. - DVT ppx: On Heparin SQ. Pending further clarification w/ family re: DOAC use.     Dispo: Attempting to reach family to further discuss. Will need rehab once cleared by cards. - DVT ppx: On Heparin SQ. Pending further clarification w/ family re: DOAC use.     Dispo: Pending rehab. Pending cardio clearance.

## 2022-06-28 NOTE — PROGRESS NOTE ADULT - PROBLEM SELECTOR PLAN 10
- DVT ppx: On Heparin SQ. Pending further clarification w/ family re: DOAC use.     Dispo: Pending rehab.

## 2022-06-28 NOTE — CHART NOTE - NSCHARTNOTEFT_GEN_A_CORE
Spoke to family about restarting AC on pt. Discussed the risks and benefits of retarting AC and family member is in agreement about restarting AC.

## 2022-06-28 NOTE — PROGRESS NOTE ADULT - PROBLEM SELECTOR PLAN 2
-Resolved.  - CT A/P with IV contrast: Distended gallbladder with minimal gallbladder wall hyperemia. Patchy areas of decreased enhancement in both kidneys which raises concern for pyelonephritis. Large fecal load in the rectum with mild perirectal fatty infiltration which raises question of stercoral colitis. Benign abd exam.  - pt s/p Manual disimpaction by GI team  , GI recommend -miralax TID, senna 17.2 QHS, BID SMOG enemas as needed  - pt moving bowels, abdominal pain resolved , tolerating diet  - Also noted to have rectal bleeding earlier, which has not recurred. Suspect 2' constipation. No further diagnostics planned. Will re-evaluate plan for anticoagulation.

## 2022-06-28 NOTE — PROGRESS NOTE ADULT - SUBJECTIVE AND OBJECTIVE BOX
[facility] Division of Hospital Medicine  Maddy Conley DO  Pager ([place], 4D-5P): [phone]      Patient is a 86y old  Male who presents with a chief complaint of abdominal pain (28 Jun 2022 11:37)      SUBJECTIVE / OVERNIGHT EVENTS: No acute events overnight. No complaints of chest pain, SOB, abd pain, N/V.    MEDICATIONS  (STANDING):  acetylcysteine 20%  Inhalation 1 milliLiter(s) Inhalation every 6 hours  aspirin  chewable 81 milliGRAM(s) Oral daily  bisacodyl 5 milliGRAM(s) Oral at bedtime  bisacodyl Suppository 10 milliGRAM(s) Rectal daily  cholecalciferol 1000 Unit(s) Oral daily  dextrose 5%. 1000 milliLiter(s) (50 mL/Hr) IV Continuous <Continuous>  dextrose 5%. 1000 milliLiter(s) (100 mL/Hr) IV Continuous <Continuous>  dextrose 50% Injectable 25 Gram(s) IV Push once  dextrose 50% Injectable 12.5 Gram(s) IV Push once  dextrose 50% Injectable 25 Gram(s) IV Push once  diltiazem    milliGRAM(s) Oral daily  fluconAZOLE   Tablet 100 milliGRAM(s) Oral daily  glucagon  Injectable 1 milliGRAM(s) IntraMuscular once  heparin   Injectable 5000 Unit(s) SubCutaneous every 12 hours  insulin lispro (ADMELOG) corrective regimen sliding scale   SubCutaneous three times a day before meals  insulin lispro (ADMELOG) corrective regimen sliding scale   SubCutaneous at bedtime  insulin lispro Injectable (ADMELOG) 3 Unit(s) SubCutaneous three times a day before meals  ipratropium    for Nebulization 500 MICROGram(s) Nebulizer every 6 hours  pantoprazole  Injectable 40 milliGRAM(s) IV Push daily  polyethylene glycol 3350 17 Gram(s) Oral <User Schedule>  predniSONE   Tablet 30 milliGRAM(s) Oral daily  senna 2 Tablet(s) Oral at bedtime  sertraline 25 milliGRAM(s) Oral daily  silver sulfADIAZINE 1% Cream 1 Application(s) Topical daily  sodium chloride 0.9%. 1000 milliLiter(s) (60 mL/Hr) IV Continuous <Continuous>    MEDICATIONS  (PRN):  acetaminophen     Tablet .. 650 milliGRAM(s) Oral every 6 hours PRN Severe Pain (7 - 10)  aluminum hydroxide/magnesium hydroxide/simethicone Suspension 30 milliLiter(s) Oral every 6 hours PRN Dyspepsia  dextrose Oral Gel 15 Gram(s) Oral once PRN Blood Glucose LESS THAN 70 milliGRAM(s)/deciliter      CAPILLARY BLOOD GLUCOSE      POCT Blood Glucose.: 218 mg/dL (28 Jun 2022 12:44)  POCT Blood Glucose.: 141 mg/dL (28 Jun 2022 08:38)  POCT Blood Glucose.: 217 mg/dL (27 Jun 2022 22:26)  POCT Blood Glucose.: 196 mg/dL (27 Jun 2022 17:49)    I&O's Summary    27 Jun 2022 07:01  -  28 Jun 2022 07:00  --------------------------------------------------------  IN: 0 mL / OUT: 400 mL / NET: -400 mL        PHYSICAL EXAM:  Vital Signs Last 24 Hrs  T(C): 36.7 (28 Jun 2022 05:55), Max: 36.7 (27 Jun 2022 17:00)  T(F): 98 (28 Jun 2022 05:55), Max: 98.1 (27 Jun 2022 21:37)  HR: 80 (28 Jun 2022 10:01) (72 - 90)  BP: 138/62 (28 Jun 2022 05:55) (116/67 - 138/62)  BP(mean): --  RR: 18 (28 Jun 2022 05:55) (17 - 18)  SpO2: 98% (28 Jun 2022 10:01) (97% - 100%)    CONSTITUTIONAL: NAD, on NC, cachectic  HEENT: sclera clear, MMM, eyes tracking  RESPIRATORY: Normal respiratory effort; trace bibasilar crackles  CARDIOVASCULAR: S1/S2, RRR, no murmurs appreciated; No lower extremity edema  ABDOMEN: soft, Nontender, nondistended, normoactive bowel sounds, no rebound/guarding  PSYCH: calm, cooperative  NEUROLOGY: awake, alert, no gross deficits, moving all extremities  SKIN: No rashes; no palpable lesions    LABS:                        11.4   11.96 )-----------( 219      ( 28 Jun 2022 08:03 )             35.5     06-28    137  |  98  |  14  ----------------------------<  114<H>  3.8   |  32<H>  |  0.55    Ca    8.7      28 Jun 2022 08:03  Phos  2.6     06-28  Mg     1.80     06-28                  RADIOLOGY & ADDITIONAL TESTS:  Results Reviewed:   Imaging Personally Reviewed:  Electrocardiogram Personally Reviewed:    COORDINATION OF CARE:  Care Discussed with Consultants/Other Providers [Y/N]:  Prior or Outpatient Records Reviewed [Y/N]:

## 2022-06-28 NOTE — PROGRESS NOTE ADULT - ASSESSMENT
86 year old Male with a PMHx of pulmonary fibrosis on chronic prednisone and home O2 4L NC, T2DM, HTN, AFib (on Eliquis), GERD, dysphagia (on ground diet and thin liquid diet at NH), protein-calorie malnutrition, and MDD (Q15 minute safety checks at NH for SI), chronic constipation, recent oral thrush, who presented to the ED with abdominal pain.

## 2022-06-28 NOTE — PROGRESS NOTE ADULT - SUBJECTIVE AND OBJECTIVE BOX
History: Pt seen at bedside. Pt was sleeping at time of visit. Pt with fair appetite. Ate breakfast and lunch today. As per daughter and Rn pt has no signs of nausea and vomiting/hypoglycemia.      MEDICATIONS  (STANDING):  acetylcysteine 20%  Inhalation 1 milliLiter(s) Inhalation every 6 hours  apixaban 2.5 milliGRAM(s) Oral every 12 hours  aspirin  chewable 81 milliGRAM(s) Oral daily  bisacodyl 5 milliGRAM(s) Oral at bedtime  bisacodyl Suppository 10 milliGRAM(s) Rectal daily  cefTRIAXone   IVPB 1000 milliGRAM(s) IV Intermittent every 24 hours  dextrose 5%. 1000 milliLiter(s) (50 mL/Hr) IV Continuous <Continuous>  dextrose 5%. 1000 milliLiter(s) (100 mL/Hr) IV Continuous <Continuous>  dextrose 50% Injectable 25 Gram(s) IV Push once  dextrose 50% Injectable 12.5 Gram(s) IV Push once  dextrose 50% Injectable 25 Gram(s) IV Push once  diltiazem    milliGRAM(s) Oral daily  fluconAZOLE   Tablet 100 milliGRAM(s) Oral daily  glucagon  Injectable 1 milliGRAM(s) IntraMuscular once  hydrocortisone hemorrhoidal Suppository 1 Suppository(s) Rectal two times a day  insulin lispro (ADMELOG) corrective regimen sliding scale   SubCutaneous three times a day before meals  insulin lispro (ADMELOG) corrective regimen sliding scale   SubCutaneous at bedtime  insulin lispro Injectable (ADMELOG) 1 Unit(s) SubCutaneous three times a day before meals  ipratropium    for Nebulization 500 MICROGram(s) Nebulizer every 6 hours  pantoprazole  Injectable 40 milliGRAM(s) IV Push daily  polyethylene glycol 3350 17 Gram(s) Oral two times a day  predniSONE   Tablet 30 milliGRAM(s) Oral daily  sertraline 25 milliGRAM(s) Oral daily  silver sulfADIAZINE 1% Cream 1 Application(s) Topical daily  simethicone 80 milliGRAM(s) Chew four times a day  sodium chloride 0.9%. 1000 milliLiter(s) (60 mL/Hr) IV Continuous <Continuous>    MEDICATIONS  (PRN):  acetaminophen     Tablet .. 650 milliGRAM(s) Oral every 6 hours PRN Severe Pain (7 - 10)  aluminum hydroxide/magnesium hydroxide/simethicone Suspension 30 milliLiter(s) Oral every 6 hours PRN Dyspepsia  dextrose Oral Gel 15 Gram(s) Oral once PRN Blood Glucose LESS THAN 70 milliGRAM(s)/deciliter      Allergies: No Known Allergies    Review of Systems:  UNABLE TO OBTAIN    PHYSICAL EXAM:  Vital Signs Last 24 Hrs  T(C): 36.8 (28 Jun 2022 14:39), Max: 37 (28 Jun 2022 09:55)  T(F): 98.3 (28 Jun 2022 14:39), Max: 98.6 (28 Jun 2022 09:55)  HR: 78 (28 Jun 2022 15:46) (72 - 89)  BP: 119/55 (28 Jun 2022 14:39) (116/67 - 138/62)  BP(mean): --  RR: 18 (28 Jun 2022 14:39) (17 - 18)  SpO2: 98% (28 Jun 2022 15:46) (98% - 100%)  GENERAL: NAD  RESPIRATORY: On Oxygen; no labored breathing   GI: Soft, nontender, non distended    CAPILLARY BLOOD GLUCOSE    POCT Blood Glucose.: 218 mg/dL (28 Jun 2022 12:44)  POCT Blood Glucose.: 141 mg/dL (28 Jun 2022 08:38)  POCT Blood Glucose.: 217 mg/dL (27 Jun 2022 22:26)  POCT Blood Glucose.: 196 mg/dL (27 Jun 2022 17:49)  POCT Blood Glucose.: 275 mg/dL (26 Jun 2022 13:27)  POCT Blood Glucose.: 321 mg/dL (26 Jun 2022 12:17)  POCT Blood Glucose.: 133 mg/dL (26 Jun 2022 08:31)  POCT Blood Glucose.: 288 mg/dL (25 Jun 2022 21:02)  POCT Blood Glucose.: 196 mg/dL (25 Jun 2022 17:45)  POCT Blood Glucose.: 316 mg/dL (24 Jun 2022 12:12)  POCT Blood Glucose.: 143 mg/dL (24 Jun 2022 08:38)  POCT Blood Glucose.: 128 mg/dL (23 Jun 2022 22:24)  POCT Blood Glucose.: 170 mg/dL (23 Jun 2022 17:28)    A1C with Estimated Average Glucose in AM (06.22.22 @ 06:00)    A1C with Estimated Average Glucose Result: 6.2      06-28    137  |  98  |  14  ----------------------------<  114<H>  3.8   |  32<H>  |  0.55    Ca    8.7      28 Jun 2022 08:03  Phos  2.6     06-28  Mg     1.80     06-28          Thyroid Function Tests:  06-22 @ 06:00 TSH 1.67 FreeT4 -- T3 -- Anti TPO -- Anti Thyroglobulin Ab -- TSI --      Diet, Minced and Moist:   Consistent Carbohydrate Evening Snack (CSTCHOSN)  Mildly Thick Liquids (MILDTHICKLIQS)  Supplement Feeding Modality:  Oral  Glucerna Shake Cans or Servings Per Day:  1       Frequency:  Three Times a day (06-24-22 @ 14:00) [Active]

## 2022-06-28 NOTE — PROGRESS NOTE ADULT - PROBLEM SELECTOR PLAN 6
- Patient with history of T2DM, on Lantus 16 units at bedtime and Humalog sliding scale  - Patient noted to be hypoglycemic to 38 in the ED, given D50 with improvement up to 311. Glycemic control improved since.   -HbA1c 6.2   - Endo f/u appreciated, hypoglycemia likely due to poor po intake along with  insulin use at the rehab   on admelog 3 U TID premeals  - Insulin regimen as per endocrine.  - Carb controlled diet.

## 2022-06-28 NOTE — PROGRESS NOTE ADULT - PROBLEM SELECTOR PLAN 1
- Tele continues to show SR.   CHADSVASC: 4  - AC held as per daughter (based on prev hospitalist conversation w/ fam). Now that we have confirmation of AF, would readdress restarting AC. No new bleeding. Evaluated by GI - deferring to primary team re: AC.  - per Np vijial [phone] pt reportedly had new onset AFIB w/ RVR so started on cardizem/Eliquis at the time.  Records in chart  Attempted to call patient's daughter twice. First time, she picked up but unable to understand provider. Called back using  ID # [id number] but no answer, left message for callback  - If Eliquis is to be restart - will re-eval need for ASA, To be discussed w/ cards.  - Cont Cardizem.

## 2022-06-28 NOTE — PROGRESS NOTE ADULT - ASSESSMENT
Patient is an 86 year old Male with a PMHx of pulmonary fibrosis on chronic prednisone and home O2 4L NC, T2DM, HTN, AFib (on Eliquis), GERD, dysphagia (on ground diet and thin liquid diet at NH), protein-calorie malnutrition, and MDD (Q15 minute safety checks at NH for SI), chronic constipation, recent oral thrush, who presented to the ED with abdominal pain.  found to have hypoglycemia at admission    1. 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  HbA1c 6.2%  hypoglycemia on admission related to receiving Lantus 16 units qhs at rehab    While inpatient:  BG target 100-180 mg/dl  Glucose above goal; pts appetite variable; daughter at bedside feeding pt   Increase Admelog to 4 units premeal (please hold if npo/not eating)  Continue Admelog low correction scale TID AC and seperate order for Admelog low correction scale at bedtime  FS before meals and at bedtime   Consistent Carbohydrate diet   Hypoglycemia Protocol  Please consider switching dextrose solution in antibiotics to sodium chloride      Discharge Plan:  If continuing prednisone 30mg daily without taper please start repaglinide 0.5mg premeal TID (please hold if skips meals)  Patient's daughter prefers to avoid insulin for dc.  Ensure patient has working glucometer, test strips, lancets, alcohol pads, and BD jose luis pen needles  Please also prescribe glucose tabs, Baqsimi nasal spray or glucagon emergency kit for hypoglycemia risk   Please follow up with opthalmology and podiatry as an outpt  Can follow up with PCP or if needed, [facility] endocrinology [phone]    2. long term use of steroids   please note, pt is on long term steroids -  currently on prednisone 30mg daily as per daughter this was started 1 month ago; d/w primary team planning on continuing without taper at this time   Do not abruptly stop steroid risk for secondary/tertiary adrenal insufficiency      3. long steroid use can lead to osteoporosis   check vitamin D level: 29.9 decreased  Consider starting vitamin d 1000 iu daily   outpt DXA     Marion Estes  Nurse Practitioner  Division of Endocrinology & Diabetes  Pager # [phone]      If after 6PM or before 9AM, or on weekends/holidays, please call endocrine answering service for assistance ([phone]).  For nonurgent matters email [email or web address] for assistance.

## 2022-06-28 NOTE — PROGRESS NOTE ADULT - PROBLEM SELECTOR PLAN 5
- Patient with history of pulmonary fibrosis w/ chronic hypoxic respiratory failure on home O2 4L ATC.  - Also on Atrovent neb q6h ATC, Acetylcysteine 10% via neb q6h, Incruse Ellipta, and Prednisone 30mg daily, as well as fluconazole 100mg daily for presumed candidiasis ppx in setting of steroid use (but unclear in NH paperwork - only listed as prophylaxis).  - CXR: Bilateral diffuse interstitial opacities consistent with chronic changes from pulmonary fibrosis.  - Crackles to B/L lung bases on exam  - Continue home meds  - Monitor SpO2 and provide supplemental O2 via NC as needed to maintain SpO2 > 92%  - Can wean down O2 to lowest tolerable setting.

## 2022-06-28 NOTE — PROGRESS NOTE ADULT - SUBJECTIVE AND OBJECTIVE BOX
Abhijit Freed MD  Interventional Cardiology / Advance Heart Failure and Cardiac Transplant Specialist  [place] Office : [address] [place] [address] [place] [address]  Tel:   [place] Office : [address] [facility] [place] [address]  Tel: [phone]      Subjective/Overnight events: Pt is lying in bed comfortable not in distress  	  MEDICATIONS:  aspirin  chewable 81 milliGRAM(s) Oral daily  diltiazem    milliGRAM(s) Oral daily  heparin   Injectable 5000 Unit(s) SubCutaneous every 12 hours    fluconAZOLE   Tablet 100 milliGRAM(s) Oral daily    acetylcysteine 20%  Inhalation 1 milliLiter(s) Inhalation every 6 hours  ipratropium    for Nebulization 500 MICROGram(s) Nebulizer every 6 hours    acetaminophen     Tablet .. 650 milliGRAM(s) Oral every 6 hours PRN  sertraline 25 milliGRAM(s) Oral daily    aluminum hydroxide/magnesium hydroxide/simethicone Suspension 30 milliLiter(s) Oral every 6 hours PRN  bisacodyl 5 milliGRAM(s) Oral at bedtime  bisacodyl Suppository 10 milliGRAM(s) Rectal daily  pantoprazole  Injectable 40 milliGRAM(s) IV Push daily  polyethylene glycol 3350 17 Gram(s) Oral <User Schedule>  senna 2 Tablet(s) Oral at bedtime    dextrose 50% Injectable 25 Gram(s) IV Push once  dextrose 50% Injectable 12.5 Gram(s) IV Push once  dextrose 50% Injectable 25 Gram(s) IV Push once  dextrose Oral Gel 15 Gram(s) Oral once PRN  glucagon  Injectable 1 milliGRAM(s) IntraMuscular once  insulin lispro (ADMELOG) corrective regimen sliding scale   SubCutaneous three times a day before meals  insulin lispro (ADMELOG) corrective regimen sliding scale   SubCutaneous at bedtime  insulin lispro Injectable (ADMELOG) 3 Unit(s) SubCutaneous three times a day before meals  predniSONE   Tablet 30 milliGRAM(s) Oral daily    cholecalciferol 1000 Unit(s) Oral daily  dextrose 5%. 1000 milliLiter(s) IV Continuous <Continuous>  dextrose 5%. 1000 milliLiter(s) IV Continuous <Continuous>  silver sulfADIAZINE 1% Cream 1 Application(s) Topical daily  sodium chloride 0.9%. 1000 milliLiter(s) IV Continuous <Continuous>      PAST MEDICAL/SURGICAL HISTORY  PAST MEDICAL & SURGICAL HISTORY:  DM (diabetes mellitus)      Pulmonary fibrosis  on home O2 and daily prednisone      HTN (hypertension)      Chronic atrial fibrillation      Dysphagia  recent cadida infection, s/p nystatin swish and swallow      GERD (gastroesophageal reflux disease)      Protein calorie malnutrition      MDD (major depressive disorder)      Constipation      S/P hernia repair      H/O cataract extraction      History of prostate surgery  prostate was &quot;scraped&quot; 2021          SOCIAL HISTORY: Substance Use (street drugs): ( x ) never used  (  ) other:    FAMILY HISTORY:  No pertinent family history in first degree relatives            PHYSICAL EXAM:  T(C): 36.7 (06-28-22 @ 05:55), Max: 36.7 (06-27-22 @ 17:00)  HR: 80 (06-28-22 @ 10:01) (72 - 90)  BP: 138/62 (06-28-22 @ 05:55) (116/67 - 138/62)  RR: 18 (06-28-22 @ 05:55) (17 - 18)  SpO2: 98% (06-28-22 @ 10:01) (97% - 100%)  Wt(kg): --  I&O's Summary    27 Jun 2022 07:01  -  28 Jun 2022 07:00  --------------------------------------------------------  IN: 0 mL / OUT: 400 mL / NET: -400 mL          GENERAL: NAD  EYES: EOMI, PERRLA, conjunctiva and sclera clear  ENMT: No tonsillar erythema, exudates, or enlargement  Cardiovascular: Normal S1 S2, No JVD, No murmurs, No edema  Respiratory: Lungs clear to auscultation	  Gastrointestinal:  Soft, Non-tender, + BS	  Extremities: No edema                                      11.4   11.96 )-----------( 219      ( 28 Jun 2022 08:03 )             35.5     06-28    137  |  98  |  14  ----------------------------<  114<H>  3.8   |  32<H>  |  0.55    Ca    8.7      28 Jun 2022 08:03  Phos  2.6     06-28  Mg     1.80     06-28      proBNP:   Lipid Profile:   HgA1c:   TSH:     Consultant(s) Notes Reviewed:  [x ] YES  [ ] NO    Care Discussed with Consultants/Other Providers [ x] YES  [ ] NO    Imaging Personally Reviewed independently:  [x] YES  [ ] NO    All labs, radiologic studies, vitals, orders and medications list reviewed. Patient is seen and examined at bedside. Case discussed with medical team.

## 2022-06-28 NOTE — PROGRESS NOTE ADULT - ASSESSMENT
86 year old Male with a PMHx of pulmonary fibrosis on chronic prednisone and home O2 4L NC, T2DM, HTN, AFib (on Eliquis), GERD, dysphagia (on ground diet and thin liquid diet at NH), protein-calorie malnutrition, and MDD (Q15 minute safety checks at NH for SI), chronic constipation, recent oral thrush, who presented to the ED with abdominal pain. Cardiology consulted for evaluation of Afib.    EKG: NSR no acute changes  Tele: NSR    1. Afib  -recent diagnosis at [facility]? started on eliquis currently on hold for blood in stool  -continue to monitor on tele   -as per rehab. patient found ot have afib at [facility] and started on elquis and cardizem  -hemoglobin stable, if no signs of GIB recommend resuming eliquis    2. Abd pain  -t/t per primary team     3. HTN  -controlled  -c/w cardizem  -continue to monitor BP     4. DVT prophylaxis  -hep subq

## 2022-06-29 NOTE — PROGRESS NOTE ADULT - PROBLEM SELECTOR PLAN 2
- UA: moderate leuk esterase, 13 WBCs, few bacteria  - CT A/P: Patchy areas of decreased enhancement in both kidneys which raises concern for pyelonephritis.  - Ucx w/ normal ortiz. Low suspicion for acute pyelonephritis as source of symptoms, given patient recently completed 7d course of Zosyn for presumed PNA. No urinary sx.   - S/p successful TOV.   -Continue to monitor off abx - has remained stable off it.  -Leukocytosis likely 2' steroids.

## 2022-06-29 NOTE — CHART NOTE - NSCHARTNOTEFT_GEN_A_CORE
Patient is an 86 year old Male with a PMHx of pulmonary fibrosis on chronic prednisone and home O2 4L NC, T2DM, HTN, AFib (on Eliquis), GERD, dysphagia (on ground diet and thin liquid diet at NH), protein-calorie malnutrition, and MDD (Q15 minute safety checks at NH for SI), chronic constipation, recent oral thrush, who presented to the ED with abdominal pain.  found to have hypoglycemia at admission    CAPILLARY BLOOD GLUCOSE  POCT Blood Glucose.: 175 mg/dL (29 Jun 2022 08:45)  POCT Blood Glucose.: 106 mg/dL (28 Jun 2022 21:34)  POCT Blood Glucose.: 196 mg/dL (28 Jun 2022 17:48)  POCT Blood Glucose.: 218 mg/dL (28 Jun 2022 12:44)      1. 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  HbA1c 6.2%  hypoglycemia on admission related to receiving Lantus 16 units qhs at rehab    While inpatient:  BG target 100-180 mg/dl  Glucose above goal; pts appetite variable; daughter at bedside feeding pt   Continue  Admelog to 4 units premeal (please hold if npo/not eating)  Continue Admelog low correction scale TID AC and seperate order for Admelog low correction scale at bedtime  FS before meals and at bedtime   Consistent Carbohydrate diet   Hypoglycemia Protocol  Please consider switching dextrose solution in antibiotics to sodium chloride      Discharge Plan:  Taoering to prednisone 20mg daily- please start repaglinide 0.5mg premeal TID (please hold if skips meals).  Once steroids are dc'd.  Prandin can be dc'd  Patient's daughter prefers to avoid insulin for dc.  Ensure patient has working glucometer, test strips, lancets, alcohol pads  Please also prescribe glucose tabs, Baqsimi nasal spray or glucagon emergency kit for hypoglycemia risk   Please follow up with opthalmology and podiatry as an outpt  Can follow up with PCP or if needed, [facility] endocrinology [phone]    2. long term use of steroids   please note, pt is on long term steroids -  currently on prednisone 30mg daily as per daughter this was started 1 month ago; d/w primary team planning to decrease to 20 mg po once dc'd  Do not abruptly stop steroid risk for secondary/tertiary adrenal insufficiency      3. long steroid use can lead to osteoporosis   check vitamin D level: 29.9 decreased  Consider starting vitamin d 1000 iu daily   outpt DXA     Marion Estes  Nurse Practitioner  Division of Endocrinology & Diabetes  Pager # [phone]      If after 6PM or before 9AM, or on weekends/holidays, please call endocrine answering service for assistance ([phone]).  For nonurgent matters email [email or web address] for assistance.

## 2022-06-29 NOTE — DISCHARGE NOTE NURSING/CASE MANAGEMENT/SOCIAL WORK - NSDCPEFALRISK_GEN_ALL_CORE
For information on Fall & Injury Prevention, visit: [email or web address] OR  [email or web address] OR  https://www.cdc.gov/steadi/patient.html

## 2022-06-29 NOTE — PROGRESS NOTE ADULT - NUTRITIONAL ASSESSMENT
This patient has been assessed with a concern for Malnutrition and has been determined to have a diagnosis/diagnoses of Severe protein-calorie malnutrition.    This patient is being managed with:   Diet Minced and Moist-  Consistent Carbohydrate {Evening Snack} (CSTCHOSN)  Mildly Thick Liquids (MILDTHICKLIQS)  Supplement Feeding Modality:  Oral  Glucerna Shake Cans or Servings Per Day:  1       Frequency:  Three Times a day  Entered: Jun 24 2022  1:58PM    
This patient has been assessed with a concern for Malnutrition and has been determined to have a diagnosis/diagnoses of Severe protein-calorie malnutrition.    This patient is being managed with:   Diet Minced and Moist-  Consistent Carbohydrate {Evening Snack} (CSTCHOSN)  Mildly Thick Liquids (MILDTHICKLIQS)  Supplement Feeding Modality:  Oral  Glucerna Shake Cans or Servings Per Day:  1       Frequency:  Three Times a day  Entered: Jun 24 2022  1:58PM    Safety Tray-    Time/Priority:  Routine  Entered: Jun 21 2022  3:40PM    
This patient has been assessed with a concern for Malnutrition and has been determined to have a diagnosis/diagnoses of Severe protein-calorie malnutrition.    This patient is being managed with:   Diet Minced and Moist-  Consistent Carbohydrate {Evening Snack} (CSTCHOSN)  Mildly Thick Liquids (MILDTHICKLIQS)  Supplement Feeding Modality:  Oral  Glucerna Shake Cans or Servings Per Day:  1       Frequency:  Three Times a day  Entered: Jun 24 2022  1:58PM    
This patient has been assessed with a concern for Malnutrition and has been determined to have a diagnosis/diagnoses of Severe protein-calorie malnutrition.    This patient is being managed with:   Diet Minced and Moist-  Consistent Carbohydrate {Evening Snack} (CSTCHOSN)  Mildly Thick Liquids (MILDTHICKLIQS)  Supplement Feeding Modality:  Oral  Glucerna Shake Cans or Servings Per Day:  1       Frequency:  Three Times a day  Entered: Jun 24 2022  1:58PM    Safety Tray-    Time/Priority:  Routine  Entered: Jun 21 2022  3:40PM    
This patient has been assessed with a concern for Malnutrition and has been determined to have a diagnosis/diagnoses of Severe protein-calorie malnutrition.    This patient is being managed with:   Diet Minced and Moist-  Consistent Carbohydrate {Evening Snack} (CSTCHOSN)  Mildly Thick Liquids (MILDTHICKLIQS)  Supplement Feeding Modality:  Oral  Glucerna Shake Cans or Servings Per Day:  1       Frequency:  Three Times a day  Entered: Jun 24 2022  1:58PM    
This patient has been assessed with a concern for Malnutrition and has been determined to have a diagnosis/diagnoses of Severe protein-calorie malnutrition.    This patient is being managed with:   Diet Minced and Moist-  Consistent Carbohydrate {Evening Snack} (CSTCHOSN)  Mildly Thick Liquids (MILDTHICKLIQS)  Supplement Feeding Modality:  Oral  Glucerna Shake Cans or Servings Per Day:  1       Frequency:  Three Times a day  Entered: Jun 24 2022  1:58PM

## 2022-06-29 NOTE — PROGRESS NOTE ADULT - SUBJECTIVE AND OBJECTIVE BOX
Abhijit Freed MD  Interventional Cardiology / Endovascular Specialist  [place] Office : [address] [place] [address] [place] [address]  Tel:   [place] Office : [address] [facility] [place] [address]  Tel: [phone]      Subjective/Overnight events: Patient lying in bed comfortably. No acute distress. Denies chest pain, SOB or palpitations  	  MEDICATIONS:  apixaban 2.5 milliGRAM(s) Oral every 12 hours  aspirin  chewable 81 milliGRAM(s) Oral daily  diltiazem    milliGRAM(s) Oral daily    fluconAZOLE   Tablet 100 milliGRAM(s) Oral daily    acetylcysteine 20%  Inhalation 1 milliLiter(s) Inhalation every 6 hours  ipratropium    for Nebulization 500 MICROGram(s) Nebulizer every 6 hours    acetaminophen     Tablet .. 650 milliGRAM(s) Oral every 6 hours PRN  sertraline 25 milliGRAM(s) Oral daily    aluminum hydroxide/magnesium hydroxide/simethicone Suspension 30 milliLiter(s) Oral every 6 hours PRN  bisacodyl 5 milliGRAM(s) Oral at bedtime  bisacodyl Suppository 10 milliGRAM(s) Rectal daily  pantoprazole  Injectable 40 milliGRAM(s) IV Push daily  polyethylene glycol 3350 17 Gram(s) Oral <User Schedule>  senna 2 Tablet(s) Oral at bedtime    dextrose 50% Injectable 25 Gram(s) IV Push once  dextrose 50% Injectable 12.5 Gram(s) IV Push once  dextrose 50% Injectable 25 Gram(s) IV Push once  dextrose Oral Gel 15 Gram(s) Oral once PRN  glucagon  Injectable 1 milliGRAM(s) IntraMuscular once  insulin lispro (ADMELOG) corrective regimen sliding scale   SubCutaneous three times a day before meals  insulin lispro (ADMELOG) corrective regimen sliding scale   SubCutaneous at bedtime  insulin lispro Injectable (ADMELOG) 3 Unit(s) SubCutaneous three times a day before meals  predniSONE   Tablet 30 milliGRAM(s) Oral daily    cholecalciferol 1000 Unit(s) Oral daily  dextrose 5%. 1000 milliLiter(s) IV Continuous <Continuous>  dextrose 5%. 1000 milliLiter(s) IV Continuous <Continuous>  silver sulfADIAZINE 1% Cream 1 Application(s) Topical daily  sodium chloride 0.9%. 1000 milliLiter(s) IV Continuous <Continuous>      PAST MEDICAL/SURGICAL HISTORY  PAST MEDICAL & SURGICAL HISTORY:  DM (diabetes mellitus)      Pulmonary fibrosis  on home O2 and daily prednisone      HTN (hypertension)      Chronic atrial fibrillation      Dysphagia  recent cadida infection, s/p nystatin swish and swallow      GERD (gastroesophageal reflux disease)      Protein calorie malnutrition      MDD (major depressive disorder)      Constipation      S/P hernia repair      H/O cataract extraction      History of prostate surgery  prostate was &quot;scraped&quot; 2021          SOCIAL HISTORY: Substance Use (street drugs): ( x ) never used  (  ) other:    FAMILY HISTORY:  No pertinent family history in first degree relatives          PHYSICAL EXAM:  T(C): 36.6 (06-29-22 @ 04:55), Max: 36.8 (06-28-22 @ 14:39)  HR: 72 (06-29-22 @ 09:57) (62 - 81)  BP: 137/68 (06-29-22 @ 04:55) (114/69 - 137/68)  RR: 18 (06-29-22 @ 04:55) (17 - 18)  SpO2: 97% (06-29-22 @ 09:57) (97% - 100%)  Wt(kg): --  I&O's Summary    28 Jun 2022 07:01  -  29 Jun 2022 07:00  --------------------------------------------------------  IN: 0 mL / OUT: 400 mL / NET: -400 mL            GENERAL: NAD  EYES: EOMI, PERRLA, conjunctiva and sclera clear  ENMT: No tonsillar erythema, exudates, or enlargement  Cardiovascular: Normal S1 S2, No JVD, No murmurs, No edema  Respiratory: Lungs clear to auscultation	  Gastrointestinal:  Soft, Non-tender, + BS	  Extremities: No edema                                  11.8   12.84 )-----------( 201      ( 29 Jun 2022 08:02 )             34.7     06-29    135  |  95<L>  |  13  ----------------------------<  133<H>  4.4   |  32<H>  |  0.54    Ca    9.0      29 Jun 2022 08:02  Phos  3.0     06-29  Mg     1.90     06-29      proBNP:   Lipid Profile:   HgA1c:   TSH:     Consultant(s) Notes Reviewed:  [x ] YES  [ ] NO    Care Discussed with Consultants/Other Providers [ x] YES  [ ] NO    Imaging Personally Reviewed independently:  [x] YES  [ ] NO    All labs, radiologic studies, vitals, orders and medications list reviewed. Patient is seen and examined at bedside. Case discussed with medical team.

## 2022-06-29 NOTE — PROGRESS NOTE ADULT - PROBLEM SELECTOR PROBLEM 9
Stage I pressure ulcer of left buttock

## 2022-06-29 NOTE — PROGRESS NOTE ADULT - ASSESSMENT
86 year old Male with a PMHx of pulmonary fibrosis on chronic prednisone and home O2 4L NC, T2DM, HTN, AFib (on Eliquis), GERD, dysphagia (on ground diet and thin liquid diet at NH), protein-calorie malnutrition, and MDD (Q15 minute safety checks at NH for SI), chronic constipation, recent oral thrush, who presented to the ED with abdominal pain. Cardiology consulted for evaluation of Afib.    EKG: NSR no acute changes  Tele: NSR    1. Afib  -recent diagnosis at [facility]? started on eliquis currently on hold for blood in stool  -continue to monitor on tele   -as per rehab. patient found ot have afib at [facility] and started on elquis and cardizem  -hemoglobin stable, no signs of GIB eliquis resumed    2. Abd pain  -t/t per primary team     3. HTN  -controlled  -c/w cardizem  -continue to monitor BP     4. DVT prophylaxis  -hep subq

## 2022-06-29 NOTE — PROGRESS NOTE ADULT - PROBLEM SELECTOR PLAN 7
- Patient with history of dysphagia, on ground texture and thin liquid diet at the NH  - Patient recently treated for oral thrush at NH with nystatin swish and swallow, now on prophylactic Fluconazole  - Puree and thin liquid diet here  - Continue Fluconazole daily ppx - this will be readdressed as OP.  - Speech & swallow evaluation appreciated, Minced and moist with mildly thick liquids  - Aspiration precautions

## 2022-06-29 NOTE — PROGRESS NOTE ADULT - PROBLEM SELECTOR PROBLEM 2
Hypoglycemia
Pyelonephritis
Hypoglycemia
Hypoglycemia
Pyelonephritis
Hypoglycemia
Pyelonephritis
Pyelonephritis
Abdominal pain
Pyelonephritis

## 2022-06-29 NOTE — PROGRESS NOTE ADULT - PROBLEM SELECTOR PLAN 5
- Patient with history of T2DM, on Lantus 16 units at bedtime and Humalog sliding scale  - Patient noted to be hypoglycemic to 38 in the ED, given D50 with improvement up to 311. Glycemic control improved since.   -HbA1c 6,2   - Endo f/u appreciated, hypoglycemia likely due to poor po intake along with  insulin use at the rehab   - dispo regimen

## 2022-06-29 NOTE — PROGRESS NOTE ADULT - PROBLEM SELECTOR PROBLEM 4
Pulmonary fibrosis
Pulmonary fibrosis
MDD (major depressive disorder)
Pulmonary fibrosis

## 2022-06-29 NOTE — PROGRESS NOTE ADULT - PROBLEM SELECTOR PROBLEM 6
Chronic atrial fibrillation
DM (diabetes mellitus)
Chronic atrial fibrillation

## 2022-06-29 NOTE — PROGRESS NOTE ADULT - PROBLEM SELECTOR PROBLEM 1
Steroid-induced hyperglycemia
Abdominal pain
Chronic atrial fibrillation
Abdominal pain

## 2022-06-29 NOTE — PROGRESS NOTE ADULT - PROVIDER SPECIALTY LIST ADULT
Cardiology
Endocrinology
Hospitalist

## 2022-06-29 NOTE — DISCHARGE NOTE PROVIDER - NSDCCPCAREPLAN_GEN_ALL_CORE_FT
PRINCIPAL DISCHARGE DIAGNOSIS  Diagnosis: Abdominal pain  Assessment and Plan of Treatment: You came to the hospital with abdominal pain. Your CT scan of the abdomen showed a large amount of stool in your colon which is what was likely causing your abdominal pain. You had a disimpaction where the stool was manually removed from your colon and you were started on a bowel regimen. Following your bowel movements, your abdominal pain has improved. Continue your bowel regimen to avoid constipation.      SECONDARY DISCHARGE DIAGNOSES  Diagnosis: HTN (hypertension)  Assessment and Plan of Treatment: Continue blood pressure medications as prescribed. Routine follow up with your PCP for blood pressure checks and medication management. A low salt diet is recommended.    Diagnosis: Pyelonephritis  Assessment and Plan of Treatment: You were treated for a urine infection with antibiotics. You do not require further antibiotics at this time.     PRINCIPAL DISCHARGE DIAGNOSIS  Diagnosis: Abdominal pain  Assessment and Plan of Treatment: You came to the hospital with abdominal pain. Your CT scan of the abdomen showed a large amount of stool in your colon which is what was likely causing your abdominal pain. You had a disimpaction where the stool was manually removed from your colon and you were started on a bowel regimen. Following your bowel movements, your abdominal pain has improved. Continue your bowel regimen to avoid constipation.      SECONDARY DISCHARGE DIAGNOSES  Diagnosis: Chronic atrial fibrillation  Assessment and Plan of Treatment: Continue your Eliquis blood thinner. You had an episode of a small amount of rectal bleeding. If you notice black stools or rectal bleeding, please contact your physician or return to the hospital. You were started on protonix for GI protection.    Diagnosis: Pulmonary fibrosis  Assessment and Plan of Treatment: Please make an appointment to see your pulmonologist. Your PREDNISONE (steroid) dose was decreased to 20mg daily. Please take this dose until evaluated by your pulmonologist. This medication increases your blood glucose. You do not want to take insulin. TAKE PRANDIN 0.5mg three times daily with meals while on this medication. When PREDNISONE is stopped, you can STOP THE PRANDIN. You were started on BACTRIM for PCP prophylaxis and FLUCONAZOLE for fungal prophylaxis. Please follow up closely with your PCP to determine when to stop your prophylactic medications.    Diagnosis: Pyelonephritis  Assessment and Plan of Treatment: You were treated for a urine infection with antibiotics. You do not require further antibiotics at this time.     PRINCIPAL DISCHARGE DIAGNOSIS  Diagnosis: Abdominal pain  Assessment and Plan of Treatment: You came to the hospital with abdominal pain. Your CT scan of the abdomen showed a large amount of stool in your colon which is what was likely causing your abdominal pain. You had a disimpaction where the stool was manually removed from your colon and you were started on a bowel regimen. Following your bowel movements, your abdominal pain has improved. Continue your bowel regimen to avoid constipation.      SECONDARY DISCHARGE DIAGNOSES  Diagnosis: Chronic atrial fibrillation  Assessment and Plan of Treatment: Continue your Eliquis blood thinner. You had an episode of a small amount of rectal bleeding. If you notice black stools or rectal bleeding, please contact your physician or return to the hospital. You were started on protonix for GI protection.    Diagnosis: Pulmonary fibrosis  Assessment and Plan of Treatment: Please make an appointment to see your pulmonologist. Your PREDNISONE (steroid) dose was decreased to 20mg daily. Please take this dose until evaluated by your pulmonologist. This medication increases your blood glucose. You do not want to take insulin. TAKE PRANDIN 0.5mg three times daily with meals while on this medication. When PREDNISONE is stopped, you can STOP THE PRANDIN. You were started on BACTRIM for PCP prophylaxis and FLUCONAZOLE for fungal prophylaxis. Please follow up closely with your PCP and lung doctor to determine when to stop your prophylactic medications.    Diagnosis: Pyelonephritis  Assessment and Plan of Treatment: You were treated for a urine infection with antibiotics. You do not require further antibiotics at this time.

## 2022-06-29 NOTE — PROGRESS NOTE ADULT - PROBLEM SELECTOR PLAN 10
- DVT ppx: Eliquis    Dispo: Back to rehab today. 35 min spent preparing Dc, counseling pt, and coordination of care.

## 2022-06-29 NOTE — DISCHARGE NOTE PROVIDER - CARE PROVIDER_API CALL
Xavi Stiles)  Internal Medicine; Pulmonary Disease  [address] [facility] [place], [place] [address]  Phone: [phone]  Fax: [phone]  Follow Up Time:

## 2022-06-29 NOTE — PROGRESS NOTE ADULT - PROBLEM SELECTOR PLAN 1
-Resolved.  - CT A/P with IV contrast: Distended gallbladder with minimal gallbladder wall hyperemia. Patchy areas of decreased enhancement in both kidneys which raises concern for pyelonephritis. Large fecal load in the rectum with mild perirectal fatty infiltration which raises question of stercoral colitis. Benign abd exam.  - pt s/p Manual disimpaction by GI team  , GI recommend -miralax TID, senna 17.2 QHS, BID SMOG enemas as needed  - pt moving bowels, abdominal pain resolved , tolerating diet  - Also noted to have rectal bleeding earlier, which has not recurred. Suspect 2' constipation. No further diagnostics planned. Tolerating restart of AC w/o issue.

## 2022-06-29 NOTE — PROGRESS NOTE ADULT - SUBJECTIVE AND OBJECTIVE BOX
[facility] Division of Hospital Medicine  Bautista Iyer) MD Vin  Pager [phone]    SUBJECTIVE:  Chief complaint: Abd pain.    Pt seen and evaluated at bedside. Daughter present at bedside.  Pt feels fine. No abd pain. No bleeding. No SOB.       ROS: All systems negative except as noted.    Vital Signs Last 24 Hrs  T(C): 36.7 (29 Jun 2022 11:42), Max: 36.8 (28 Jun 2022 14:39)  T(F): 98 (29 Jun 2022 11:42), Max: 98.3 (28 Jun 2022 14:39)  HR: 74 (29 Jun 2022 11:42) (62 - 81)  BP: 117/60 (29 Jun 2022 11:42) (114/69 - 137/68)  BP(mean): --  RR: 18 (29 Jun 2022 11:42) (17 - 18)  SpO2: 99% (29 Jun 2022 11:42) (97% - 100%)    PHYSICAL EXAM:  Gen- In bed, comfortable, NAD  Resp- CTAB, good effort. No r/r/w. No accessory muscle use.  CVS- RRR, S1S2, no g/r/m. No LE edema.  GI- Soft abd, NT, ND, +BSx4. No HSM.  MSK- No C/C. ROM intact. No crepitus.  Neuro- CN II-XII intact. Speech fluent/face symmetric. Sensation intact.      MEDICATION:  MEDICATIONS  (STANDING):  acetylcysteine 20%  Inhalation 1 milliLiter(s) Inhalation every 6 hours  apixaban 2.5 milliGRAM(s) Oral every 12 hours  aspirin  chewable 81 milliGRAM(s) Oral daily  bisacodyl 5 milliGRAM(s) Oral at bedtime  bisacodyl Suppository 10 milliGRAM(s) Rectal daily  cholecalciferol 1000 Unit(s) Oral daily  dextrose 5%. 1000 milliLiter(s) (50 mL/Hr) IV Continuous <Continuous>  dextrose 5%. 1000 milliLiter(s) (100 mL/Hr) IV Continuous <Continuous>  dextrose 50% Injectable 25 Gram(s) IV Push once  dextrose 50% Injectable 12.5 Gram(s) IV Push once  dextrose 50% Injectable 25 Gram(s) IV Push once  diltiazem    milliGRAM(s) Oral daily  fluconAZOLE   Tablet 100 milliGRAM(s) Oral daily  glucagon  Injectable 1 milliGRAM(s) IntraMuscular once  insulin lispro (ADMELOG) corrective regimen sliding scale   SubCutaneous three times a day before meals  insulin lispro (ADMELOG) corrective regimen sliding scale   SubCutaneous at bedtime  insulin lispro Injectable (ADMELOG) 3 Unit(s) SubCutaneous three times a day before meals  ipratropium    for Nebulization 500 MICROGram(s) Nebulizer every 6 hours  pantoprazole  Injectable 40 milliGRAM(s) IV Push daily  polyethylene glycol 3350 17 Gram(s) Oral <User Schedule>  predniSONE   Tablet 30 milliGRAM(s) Oral daily  senna 2 Tablet(s) Oral at bedtime  sertraline 25 milliGRAM(s) Oral daily  silver sulfADIAZINE 1% Cream 1 Application(s) Topical daily  sodium chloride 0.9%. 1000 milliLiter(s) (60 mL/Hr) IV Continuous <Continuous>    MEDICATIONS  (PRN):  acetaminophen     Tablet .. 650 milliGRAM(s) Oral every 6 hours PRN Severe Pain (7 - 10)  aluminum hydroxide/magnesium hydroxide/simethicone Suspension 30 milliLiter(s) Oral every 6 hours PRN Dyspepsia  dextrose Oral Gel 15 Gram(s) Oral once PRN Blood Glucose LESS THAN 70 milliGRAM(s)/deciliter      LABORATORY:                        11.8   12.84 )-----------( 201      ( 29 Jun 2022 08:02 )             34.7     06-29    135  |  95<L>  |  13  ----------------------------<  133<H>  4.4   |  32<H>  |  0.54    Ca    9.0      29 Jun 2022 08:02  Phos  3.0     06-29  Mg     1.90     06-29      COVID-19 PCR: Jacobyteanurag (27 Jun 2022 06:27)

## 2022-06-29 NOTE — DISCHARGE NOTE PROVIDER - NSDCMRMEDTOKEN_GEN_ALL_CORE_FT
acetylcysteine 10% inhalation solution: 2 milliliter(s) inhaled by nebulizer route every 6 hours  apixaban 2.5 mg oral tablet: 1 tab(s) orally 2 times a day  aspirin 81 mg oral delayed release tablet: 1 tab(s) orally once a day  Atrovent 500 mcg/2.5 mL inhalation solution: 2.5 milliliter(s) inhaled every 6 hours  DilTIAZem (Eqv-Cardizem CD) 240 mg/24 hours oral capsule, extended release: 1 cap(s) orally once a day  docusate sodium 100 mg oral tablet: 1 tab(s) orally 2 times a day  famotidine 20 mg oral tablet: 1 tab(s) orally once a day  fluconazole 100 mg oral tablet: 1 tab(s) orally once a day  HumaLOG 100 units/mL subcutaneous solution: sliding scale with meals and HS   Incruse Ellipta 62.5 mcg/inh inhalation powder: 1 puff(s) inhaled every 24 hours  lactulose 10 g/15 mL oral syrup: 30 milliliter(s) orally 2 times a day x 5 days   Lantus 100 units/mL subcutaneous solution: 16 unit(s) subcutaneous once a day (at bedtime)  Maalox Advanced Regular Strength oral suspension: 30 milliliter(s) orally every 4 hours, As Needed  MiraLax oral powder for reconstitution: 17 gram(s) orally once a day  predniSONE 20 mg oral tablet: 1.5 tab(s) orally once a day (30mg)  Senna 8.6 mg oral tablet: 1 tab(s) orally once a day (at bedtime)  sertraline 25 mg oral tablet: 1 tab(s) orally once a day  Silvadene 1% topical cream: Apply topically to affected area once a day  Tylenol 325 mg oral tablet: 2 tab(s) orally every 6 hours, As Needed   acetylcysteine 10% inhalation solution: 2 milliliter(s) inhaled by nebulizer route every 6 hours  apixaban 2.5 mg oral tablet: 1 tab(s) orally 2 times a day  aspirin 81 mg oral delayed release tablet: 1 tab(s) orally once a day  Atrovent 500 mcg/2.5 mL inhalation solution: 2.5 milliliter(s) inhaled every 6 hours  bisacodyl 10 mg rectal suppository: 1 suppository(ies) rectal once a day as needed for constipation   bisacodyl 5 mg oral delayed release tablet: 1 tab(s) orally once a day (at bedtime) as needed for constipation   cholecalciferol oral tablet: 1000 unit(s) orally once a day  DilTIAZem (Eqv-Cardizem CD) 240 mg/24 hours oral capsule, extended release: 1 cap(s) orally once a day  fluconazole 100 mg oral tablet: 1 tab(s) orally once a day  HumaLOG 100 units/mL subcutaneous solution: sliding scale with meals and HS   Incruse Ellipta 62.5 mcg/inh inhalation powder: 1 puff(s) inhaled every 24 hours  Lantus 100 units/mL subcutaneous solution: 16 unit(s) subcutaneous once a day (at bedtime)  Maalox Advanced Regular Strength oral suspension: 30 milliliter(s) orally every 4 hours, As Needed  MiraLax oral powder for reconstitution: 17 gram(s) orally once a day as needed for constipation   predniSONE 20 mg oral tablet: 1 tab(s) orally once a day   Protonix 40 mg oral delayed release tablet: 1 tab(s) orally once a day   Senna 8.6 mg oral tablet: 1 tab(s) orally once a day (at bedtime)  sertraline 25 mg oral tablet: 1 tab(s) orally once a day  Silvadene 1% topical cream: Apply topically to affected area once a day  Tylenol 325 mg oral tablet: 2 tab(s) orally every 6 hours, As Needed   acetylcysteine 10% inhalation solution: 2 milliliter(s) inhaled by nebulizer route every 6 hours  apixaban 2.5 mg oral tablet: 1 tab(s) orally 2 times a day  aspirin 81 mg oral delayed release tablet: 1 tab(s) orally once a day  Atrovent 500 mcg/2.5 mL inhalation solution: 2.5 milliliter(s) inhaled every 6 hours  Bactrim 400 mg-80 mg oral tablet: 1 milligram(s) orally once a day   bisacodyl 10 mg rectal suppository: 1 suppository(ies) rectal once a day as needed for constipation   bisacodyl 5 mg oral delayed release tablet: 1 tab(s) orally once a day (at bedtime) as needed for constipation   cholecalciferol oral tablet: 1000 unit(s) orally once a day  DilTIAZem (Eqv-Cardizem CD) 240 mg/24 hours oral capsule, extended release: 1 cap(s) orally once a day  fluconazole 100 mg oral tablet: 1 tab(s) orally once a day  Incruse Ellipta 62.5 mcg/inh inhalation powder: 1 puff(s) inhaled every 24 hours  Maalox Advanced Regular Strength oral suspension: 30 milliliter(s) orally every 4 hours, As Needed  MiraLax oral powder for reconstitution: 17 gram(s) orally once a day as needed for constipation   predniSONE 20 mg oral tablet: 1 tab(s) orally once a day   Protonix 40 mg oral delayed release tablet: 1 tab(s) orally once a day   repaglinide 0.5 mg oral tablet: 1 tab(s) orally 3 times a day   ONLY TO BE TAKEN WHILE PATIENT IS STIL ON PREDNISONE 20mg   Senna 8.6 mg oral tablet: 1 tab(s) orally once a day (at bedtime)  sertraline 25 mg oral tablet: 1 tab(s) orally once a day  Silvadene 1% topical cream: Apply topically to affected area once a day  Tylenol 325 mg oral tablet: 2 tab(s) orally every 6 hours, As Needed   acetylcysteine 10% inhalation solution: 2 milliliter(s) inhaled by nebulizer route every 6 hours  apixaban 2.5 mg oral tablet: 1 tab(s) orally 2 times a day  Atrovent 500 mcg/2.5 mL inhalation solution: 2.5 milliliter(s) inhaled every 6 hours  Bactrim 400 mg-80 mg oral tablet: 1 milligram(s) orally once a day   bisacodyl 10 mg rectal suppository: 1 suppository(ies) rectal once a day as needed for constipation   bisacodyl 5 mg oral delayed release tablet: 1 tab(s) orally once a day (at bedtime) as needed for constipation   cholecalciferol oral tablet: 1000 unit(s) orally once a day  DilTIAZem (Eqv-Cardizem CD) 240 mg/24 hours oral capsule, extended release: 1 cap(s) orally once a day  fluconazole 100 mg oral tablet: 1 tab(s) orally once a day  Incruse Ellipta 62.5 mcg/inh inhalation powder: 1 puff(s) inhaled every 24 hours  Maalox Advanced Regular Strength oral suspension: 30 milliliter(s) orally every 4 hours, As Needed  MiraLax oral powder for reconstitution: 17 gram(s) orally once a day as needed for constipation   predniSONE 20 mg oral tablet: 1 tab(s) orally once a day   Protonix 40 mg oral delayed release tablet: 1 tab(s) orally once a day   repaglinide 0.5 mg oral tablet: 1 tab(s) orally 3 times a day   ONLY TO BE TAKEN WHILE PATIENT IS STIL ON PREDNISONE 20mg   Senna 8.6 mg oral tablet: 1 tab(s) orally once a day (at bedtime)  sertraline 25 mg oral tablet: 1 tab(s) orally once a day  Silvadene 1% topical cream: Apply topically to affected area once a day  Tylenol 325 mg oral tablet: 2 tab(s) orally every 6 hours, As Needed

## 2022-06-29 NOTE — DISCHARGE NOTE PROVIDER - HOSPITAL COURSE
86 year old Male with a PMHx of pulmonary fibrosis on chronic prednisone and home O2 4L NC, T2DM, HTN, AFib (on Eliquis), GERD, dysphagia (on ground diet and thin liquid diet at NH), protein-calorie malnutrition, and MDD (Q15 minute safety checks at NH for SI), chronic constipation, recent oral thrush, who presented to the ED with abdominal pain. CTAP with stercoral colitis. Patient is s/p disimpaction and aggressive bowel regimen. Abdominal pain resolved and patient moving bowels well. CT scan also notable for patchy areas of decreased enhancement in both kidney which raises concern for pyelonephritis. UA with moderate leuk esterase and UCx with normal ortiz. Completed short course of ceftriaxone (patient also recently completed a course of Zosyn for suspected PNA prior to this admission). Patient with history of atrial fibrillation. Patient noted to have BRPRPR           ·  Problem: MDD (major depressive disorder).   ·  Plan: - Patient with history of MDD, on Sertraline 25 mg PO daily, also on Q15 minute safety checks at NH for SI  -pt denies SI at this time   -psych eval appreciated   -  no need for 1:1 Observation as per psych   - Continue Sertraline 25mg daily  -No contraindication to discharge as per [facility].     Problem/Plan - 6:  ·  Problem: DM (diabetes mellitus).   ·  Plan: - Patient with history of T2DM, on Lantus 16 units at bedtime and Humalog sliding scale  - Patient noted to be hypoglycemic to 38 in the ED, given D50 with improvement up to 311. Glycemic control improved since.   -HbA1c 6.2   - Endo f/u appreciated, hypoglycemia likely due to poor po intake along with  insulin use at the rehab   on admelog 3 U TID premeals  - Insulin regimen as per endocrine.  - Carb controlled diet.   86 year old Male with a PMHx of pulmonary fibrosis on chronic prednisone and home O2 4L NC, T2DM, HTN, AFib (on Eliquis), GERD, dysphagia (on ground diet and thin liquid diet at NH), protein-calorie malnutrition, and MDD (Q15 minute safety checks at NH for SI), chronic constipation, recent oral thrush, who presented to the ED with abdominal pain. CTAP with stercoral colitis. Patient is s/p disimpaction and aggressive bowel regimen. Abdominal pain resolved and patient moving bowels well. CT scan also notable for patchy areas of decreased enhancement in both kidney which raises concern for pyelonephritis. UA with moderate leuk esterase and UCx with normal ortiz. Completed short course of ceftriaxone (patient also recently completed a course of Zosyn for suspected PNA prior to this admission). Patient with history of atrial fibrillation. Patient noted to have mild isolated episode of rectal bleeding which resolved; eliquis initially held and later resumed without complications.              Problem/Plan - 6:  ·  Problem: DM (diabetes mellitus).   ·  Plan: - Patient with history of T2DM, on Lantus 16 units at bedtime and Humalog sliding scale  - Patient noted to be hypoglycemic to 38 in the ED, given D50 with improvement up to 311. Glycemic control improved since.   -HbA1c 6.2   - Endo f/u appreciated, hypoglycemia likely due to poor po intake along with  insulin use at the rehab   on admelog 3 U TID premeals  - Insulin regimen as per endocrine.  - Carb controlled diet.   86 year old Male with a PMHx of pulmonary fibrosis on chronic prednisone and home O2 4L NC, T2DM, HTN, AFib (on Eliquis), GERD, dysphagia (on ground diet and thin liquid diet at NH), protein-calorie malnutrition, and MDD (Q15 minute safety checks at NH for SI), chronic constipation, recent oral thrush, who presented to the ED with abdominal pain. CTAP with stercoral colitis. Patient is s/p disimpaction and aggressive bowel regimen. Abdominal pain resolved and patient moving bowels well. CT scan also notable for patchy areas of decreased enhancement in both kidney which raises concern for pyelonephritis. UA with moderate leuk esterase and UCx with normal ortiz. Completed short course of ceftriaxone (patient also recently completed a course of Zosyn for suspected PNA prior to this admission). Patient with history of atrial fibrillation. Patient noted to have mild isolated episode of rectal bleeding which resolved; eliquis initially held and later resumed without complications.              86 year old Male with a PMHx of pulmonary fibrosis on chronic prednisone and home O2 4L NC, T2DM, HTN, AFib (on Eliquis), GERD, dysphagia (on ground diet and thin liquid diet at NH), protein-calorie malnutrition, and MDD (Q15 minute safety checks at NH for SI), chronic constipation, recent oral thrush, who presented to the ED with abdominal pain. CTAP with stercoral colitis. Patient is s/p disimpaction and aggressive bowel regimen. Abdominal pain resolved and patient moving bowels well. CT scan also notable for patchy areas of decreased enhancement in both kidney which raises concern for pyelonephritis. UA with moderate leuk esterase and UCx with normal ortiz. Completed short course of ceftriaxone (patient also recently completed a course of Zosyn for suspected PNA prior to this admission). Patient with history of atrial fibrillation. Patient noted to have mild isolated episode of rectal bleeding which resolved; eliquis initially held and later resumed without complications.     Medications reviewed with Dr. Banuelos prior to discharge.   Case discussed with Dr. Banuelos on 6/29/22. The patient is medically stable for discharge to rehab.              86 year old Male with a PMHx of pulmonary fibrosis on chronic prednisone and home O2 4L NC, T2DM, HTN, AFib (on Eliquis), GERD, dysphagia (on ground diet and thin liquid diet at NH), protein-calorie malnutrition, and MDD (Q15 minute safety checks at NH for SI), chronic constipation, recent oral thrush, who presented to the ED with abdominal pain. CTAP with stercoral colitis. Patient is s/p disimpaction and aggressive bowel regimen. Abdominal pain resolved and patient moving bowels well. CT scan also notable for patchy areas of decreased enhancement in both kidney which raises concern for pyelonephritis. UA with moderate leuk esterase and UCx with normal ortiz. Completed short course of ceftriaxone (patient also recently completed a course of Zosyn for suspected PNA prior to this admission). He has remained stable off abx. Patient with history of new onset atrial fibrillation with RVR (dx at [facility]). Patient noted to have mild isolated episode of rectal bleeding which resolved; eliquis initially held and later resumed without complications. He was seen by GI, no plan for additional interventions. Spoke w/ OP providers - YOANDY Logan from facility and pulmonologist Dr Stiles. Plan to taper prednisone, will reduce to 20mg daily. Rest of taper as OP. Starting GI/PCP ppx while on higher dose. Can DC once at off steroid. At this point, no further need to remain in hospital. DC back to [facility]. Spoke w/ daughter at bedside.    Medications reviewed with Dr. Banuelos prior to discharge.   Case discussed with Dr. Banuelos on 6/29/22. The patient is medically stable for discharge to rehab.              86 year old Male with a PMHx of pulmonary fibrosis on chronic prednisone and home O2 4L NC, T2DM, HTN, AFib (on Eliquis), GERD, dysphagia (on ground diet and thin liquid diet at NH), protein-calorie malnutrition, and MDD (Q15 minute safety checks at NH for SI), chronic constipation, recent oral thrush, who presented to the ED with abdominal pain. CTAP with stercoral colitis. Patient is s/p disimpaction and aggressive bowel regimen. Abdominal pain resolved and patient moving bowels well. CT scan also notable for patchy areas of decreased enhancement in both kidney which raises concern for pyelonephritis. UA with moderate leuk esterase and UCx with normal rotiz. Completed short course of ceftriaxone (patient also recently completed a course of Zosyn for suspected PNA prior to this admission). He has remained stable off abx. Patient with history of new onset atrial fibrillation with RVR (dx at [facility]). Patient noted to have mild isolated episode of rectal bleeding which resolved; eliquis initially held and later resumed without complications. He was seen by GI, no plan for additional interventions. Reviewed chart for indication for ASA, no specific indications reviewed. As pt now on Eliquis and had issue w/ bleeding earlier, will stop ASA. Spoke w/ discharging RN who was told to confirm ASA is off list. DC med rec updated. Spoke w/ OP providers - YOANDY Logan from facility and pulmonologist Dr Stiles. Plan to taper prednisone, will reduce to 20mg daily. Rest of taper as OP. Starting GI/PCP ppx while on higher dose. Can DC once at off steroid. At this point, no further need to remain in hospital. DC back to [facility]. Spoke w/ daughter at bedside.    Medications reviewed with Dr. Banuelos prior to discharge.   Case discussed with Dr. Banuelos on 6/29/22. The patient is medically stable for discharge to rehab.

## 2022-06-29 NOTE — PROGRESS NOTE ADULT - PROBLEM SELECTOR PLAN 4
- Patient with history of pulmonary fibrosis w/ chronic hypoxic respiratory failure on home O2 4L ATC.  - Also on Atrovent neb q6h ATC, Acetylcysteine 10% via neb q6h, Incruse Ellipta, and Prednisone 30mg daily, as well as fluconazole 100mg daily for presumed candidiasis ppx in setting of steroid use (but unclear in NH paperwork - only listed as prophylaxis).  - CXR: Bilateral diffuse interstitial opacities consistent with chronic changes from pulmonary fibrosis.  - Reviewed outpatient records. Seen by pulm on 5/21. Spoke w/ Dr. Stiles (pulmonologist, OP).   - No clear indication for systemic steroid. Pulm does not feel that would impact symptoms or overall course.  - Will wean off prednisone. Reduce to prednisone 20mg daily (advise slower taper given duration since on steroid). Would plan to reduce at least qweekly until 10mg and follow up with pulmonlogist as OP.  -While on high dose steroid - would continue protonix and start bactrim for GI and PJP ppx, respectively.  -Plan was reviewed w/ YOANDY Logan (from facility )
- Patient with history of pulmonary fibrosis w/ chronic hypoxic respiratory failure on home O2 4L ATC.  - Also on Atrovent neb q6h ATC, Acetylcysteine 10% via neb q6h, Incruse Ellipta, and Prednisone 30mg daily, as well as fluconazole 100mg daily for presumed candidiasis ppx in setting of steroid use (but unclear in NH paperwork - only listed as prophylaxis).  - CXR: Bilateral diffuse interstitial opacities consistent with chronic changes from pulmonary fibrosis.  - Crackles to B/L lung bases on exam  - Continue home meds  - Monitor SpO2 and provide supplemental O2 via NC as needed to maintain SpO2 > 92%  - Can wean down O2 to lowest tolerable setting.
- Patient with history of pulmonary fibrosis on home O2 4L ATC.  - Also on Atrovent neb q6h ATC, Acetylcysteine 10% via neb q6h, Incruse Ellipta, and Prednisone 30mg daily, as well as fluconazole 100mg daily for presumed candidiasis ppx in setting of steroid use (but unclear in NH paperwork - only listed as prophylaxis).  - CXR: Bilateral diffuse interstitial opacities consistent with chronic changes from pulmonary fibrosis.  - Crackles to B/L lung bases on exam  - Continue home meds  - Monitor SpO2 and provide supplemental O2 via NC as needed to maintain SpO2 > 92%  - will continue with 2L via NC for now as saturation 100%
- Patient with history of MDD, on Sertraline 25 mg PO daily, also on Q15 minute safety checks at NH for SI  -pt denies SI at this time   -psych eval appreciated   -  no need for 1:1 Observation as per psych   - Continue Sertraline 25mg daily  -No contraindication to discharge as per Pscyh
- Patient with history of pulmonary fibrosis on home O2 4L ATC.  - Also on Atrovent neb q6h ATC, Acetylcysteine 10% via neb q6h, Incruse Ellipta, and Prednisone 30mg daily, as well as fluconazole 100mg daily for presumed candidiasis ppx in setting of steroid use (but unclear in NH paperwork - only listed as prophylaxis).  - CXR: Bilateral diffuse interstitial opacities consistent with chronic changes from pulmonary fibrosis.  - Crackles to B/L lung bases on exam  - Continue home meds  - Monitor SpO2 and provide supplemental O2 via NC as needed to maintain SpO2 > 92%  - will continue with 2L via NC for now as saturation 100%

## 2022-06-29 NOTE — PROGRESS NOTE ADULT - PROBLEM SELECTOR PROBLEM 5
DM (diabetes mellitus)
Pulmonary fibrosis
DM (diabetes mellitus)

## 2022-06-29 NOTE — PROGRESS NOTE ADULT - PROBLEM SELECTOR PLAN 6
- Stable.   - Spoke w/ [facility] [phone] who covered pt. Per collateral - pt reportedly had new onset AFIB w/ RVR so started on cardizem/Eliquis at the time.  Records received and reviewed.  - Restarted on Eliquis - tolerating.  - Cont Cardizem.

## 2022-06-29 NOTE — PROGRESS NOTE ADULT - PROBLEM SELECTOR PROBLEM 3
At risk for osteoporosis
MDD (major depressive disorder)
At risk for osteoporosis
At risk for osteoporosis
MDD (major depressive disorder)
Pyelonephritis
At risk for osteoporosis
MDD (major depressive disorder)
MDD (major depressive disorder)

## 2022-07-15 PROBLEM — I48.20 CHRONIC ATRIAL FIBRILLATION, UNSPECIFIED: Chronic | Status: ACTIVE | Noted: 2022-01-01

## 2022-07-15 PROBLEM — J84.10 PULMONARY FIBROSIS, UNSPECIFIED: Chronic | Status: ACTIVE | Noted: 2022-01-01

## 2022-07-15 PROBLEM — I10 ESSENTIAL (PRIMARY) HYPERTENSION: Chronic | Status: ACTIVE | Noted: 2022-01-01

## 2022-07-15 PROBLEM — E46 UNSPECIFIED PROTEIN-CALORIE MALNUTRITION: Chronic | Status: ACTIVE | Noted: 2022-01-01

## 2022-07-15 PROBLEM — E11.9 TYPE 2 DIABETES MELLITUS WITHOUT COMPLICATIONS: Chronic | Status: ACTIVE | Noted: 2022-01-01

## 2022-07-15 PROBLEM — R13.10 DYSPHAGIA, UNSPECIFIED: Chronic | Status: ACTIVE | Noted: 2022-01-01

## 2022-07-15 PROBLEM — F32.9 MAJOR DEPRESSIVE DISORDER, SINGLE EPISODE, UNSPECIFIED: Chronic | Status: ACTIVE | Noted: 2022-01-01

## 2022-07-15 PROBLEM — K59.00 CONSTIPATION, UNSPECIFIED: Chronic | Status: ACTIVE | Noted: 2022-01-01

## 2022-07-15 PROBLEM — K21.9 GASTRO-ESOPHAGEAL REFLUX DISEASE WITHOUT ESOPHAGITIS: Chronic | Status: ACTIVE | Noted: 2022-01-01

## 2022-07-15 NOTE — ED PROVIDER NOTE - ENMT, MLM
Airway patent, Nasal mucosa clear. Mouth with normal mucosa. Throat has no vesicles, no oropharyngeal exudates and uvula is midline. Airway patent, Nasal mucosa clear. dry oral mucosa. Throat has no vesicles, no oropharyngeal exudates

## 2022-07-15 NOTE — ED PROVIDER NOTE - CLINICAL SUMMARY MEDICAL DECISION MAKING FREE TEXT BOX
Patient is a 87yo M presenting with abdominal pain, bright red rectal bleeding, and AMS for 1 day. PMH is pertinent for dysphagia, pulmonary fibrosis, HTN, a-fib currently on eliquis. PE is positive for lethargy, suprapubic abdominal tenderness, diffuse crackles, and soft stool with blood present in the rectum. Plan is for full infectious workup with UA, UC, BC, CMP, chest x-ray, COVID test and a CT of the abdomen and pelvis for abdominal pain. Patient is a 87yo M presenting with abdominal pain, bright red rectal bleeding, and AMS for 1 day. PMH is pertinent for dysphagia, pulmonary fibrosis, HTN, a-fib currently on eliquis. PE is positive for lethargy, suprapubic abdominal tenderness, diffuse crackles, and soft stool w/o clear bleeding present in the rectum. Plan is for full infectious workup for lethargy with UA, UC, BC, CMP, chest x-ray, COVID test and a CT of the abdomen and pelvis for abdominal pain. Patient is a 85yo M presenting with abdominal pain, bright red rectal bleeding, and AMS for 1 day. PMH is pertinent for dysphagia, pulmonary fibrosis, HTN, a-fib currently on eliquis. PE is positive for lethargy, suprapubic abdominal tenderness, diffuse crackles, and soft stool w/o clear bleeding present in the rectum. Plan is for full infectious workup for lethargy with UA, UC, BC, CMP, chest x-ray, COVID test and a CT of the abdomen and pelvis for abdominal pain.  Navarro: 85yo with ams. seen with dark stool and bloody stool. Also with infectious symptoms. will evaluate for infectious source. run as sepsis.

## 2022-07-15 NOTE — ED ADULT TRIAGE NOTE - CHIEF COMPLAINT QUOTE
[facility] st[facility] x 1 this am with increased llethargy. responsive to pain at present.  non ambulatory baseline with confusion. fs by ems 324. pmh- dm,cvacopd- 27/7 nasal olivia 3l.  . lungs congested [facility]- reported black stools x 1 this am with increased llethargy. responsive to pain at present.  non ambulatory baseline with confusion. fs by ems 324. pmh- dm pulmonary fibrosis ,  nasal olivia 3l.  . lungs congested. recent lij adm- colitis. daughter arrives- states pt c/o abd pans  since yesterday. [facility]- reported black stools x 1 this am with increased   lethargy. responsive to pain at present.  non ambulatory baseline with confusion. fs by ems 324. pmh- dm pulmonary fibrosis ,  nasal olivia 3l.  . lungs congested. recent lij adm- colitis. daughter arrives- states pt c/o abd pans  since yesterday.

## 2022-07-15 NOTE — H&P ADULT - NSHPLABSRESULTS_GEN_ALL_CORE
Labs personall reviewed.                          11.3   18.86 )-----------( 170      ( 15 Jul 2022 10:40 )             34.9       07-15    139  |  101  |  14  ----------------------------<  164<H>  5.0   |  30  |  0.29<L>    Ca    9.5      15 Jul 2022 10:40  Mg     1.80     07-15    TPro  5.9<L>  /  Alb  3.0<L>  /  TBili  0.7  /  DBili  x   /  AST  29  /  ALT  30  /  AlkPhos  132<H>  07-15                  PT/INR - ( 15 Jul 2022 10:40 )   PT: 13.8 sec;   INR: 1.19 ratio         PTT - ( 15 Jul 2022 10:40 )  PTT:24.1 sec                    Culture Results:   <10,000 CFU/mL Normal Urogenital Gabby (06-21 @ 01:35)      Imaging personally reviewed.    EKG personally reviewed.

## 2022-07-15 NOTE — ED PROVIDER NOTE - CARE PLAN
1 Principal Discharge DX:	AP (abdominal pain)  Secondary Diagnosis:	Constipation  Secondary Diagnosis:	Lethargy

## 2022-07-15 NOTE — H&P ADULT - TIME BILLING
Reviewing the EMR, vitals, imaging, medication list, recent labs, prior records and coordinating care with medical providers

## 2022-07-15 NOTE — CHART NOTE - NSCHARTNOTEFT_GEN_A_CORE
: Maribel Shay    INDICATION:    PROCEDURE:  [ ] LIMITED ECHO  [ x ] LIMITED CHEST  [ ] LIMITED RETROPERITONEAL  [ ] LIMITED ABDOMINAL  [ ] LIMITED DVT  [ ] NEEDLE GUIDANCE VASCULAR  [ ] NEEDLE GUIDANCE THORACENTESIS  [ ] NEEDLE GUIDANCE PARACENTESIS  [ ] NEEDLE GUIDANCE PERICARDIOCENTESIS  [ ] OTHER    FINDINGS:  Left chest: anterior and lateral lung sliding   Right chest: lung sliding      Unable to localize lung point, small pocket of A line concerning for PTX Left posterior      INTERPRETATION:  Possible pneumothorax but not definitive without localization of lung point.     No effusions, few B lines.    Images uploaded on [employer] : Maribel Shay    INDICATION:    PROCEDURE:  [ ] LIMITED ECHO  [ x ] LIMITED CHEST  [ ] LIMITED RETROPERITONEAL  [ ] LIMITED ABDOMINAL  [ ] LIMITED DVT  [ ] NEEDLE GUIDANCE VASCULAR  [ ] NEEDLE GUIDANCE THORACENTESIS  [ ] NEEDLE GUIDANCE PARACENTESIS  [ ] NEEDLE GUIDANCE PERICARDIOCENTESIS  [ ] OTHER    FINDINGS:  Left chest: anterior and lateral lung sliding   Right chest: lung sliding      Unable to localize lung point, small pocket of A line concerning for PTX Left posterior      INTERPRETATION:  Possible pneumothorax but not definitive without localization of lung point.     No effusions, few B lines.    Images uploaded on [employer]    Agree with above.   I was present for the whole duration of the procedure and providing supervision where appropriate..

## 2022-07-15 NOTE — ED ADULT NURSE NOTE - CHIEF COMPLAINT QUOTE
[facility]- reported black stools x 1 this am with increased   lethargy. responsive to pain at present.  non ambulatory baseline with confusion. fs by ems 324. pmh- dm pulmonary fibrosis ,  nasal olivia 3l.  . lungs congested. recent lij adm- colitis. daughter arrives- states pt c/o abd pans  since yesterday.

## 2022-07-15 NOTE — PATIENT PROFILE ADULT - FALL HARM RISK - HARM RISK INTERVENTIONS

## 2022-07-15 NOTE — PATIENT PROFILE ADULT - NSPRESCRALCFREQ_GEN_A_NUR
[facility] Inpatient Rehab Individualized Plan of Care Weekly Progress Update (Team Conference)       10/12/17 1021   Diagnosis   Primary Rehabilitation Diagnosis SDH, BI   Interdisciplinary Assessment   Facilitating Factors Previous experience in rehab;Supportive care partner(s);Environmental factors at d/c (home set-up, equipment);Educational background   Social Support   Social Support Parents   Primary Person to Coordinate Post-Discharge Plans Patient;Care partner (comment)  (parents)   Transportation Resources parents   Barriers to Community Discharge none   Medical   Active Medical Problems See MD progress note;See problem list   Medication Management   Medication Management Will require assist at discharge   Person to Assist Patient with Medication Management parents   Skin Integrity   Skin Integrity Skin intact;At risk for breakdown  (old incisions)   Pain   Pain Yes   Pain Location headaches    Pain Level 5/10   Pain Intervention tylenol   Bladder   Bladder Incontinent daily   Devices Required Urinal;Incontinence undergarment   Level of Assistance Maximal assist   Bowel   Bowel Always continent   Devices Required Commode   Level of Assistance Supervision   Swallow and/or Eating   Diet Modified   Diet Modified Easy chew   Liquids Thin   Level of Assistance to Feed Self Minimal assist  (and set up)   D/C Goal Supervision   Progress  Towards Plan of Care Discharge Goals Progressing   Communication   Comprehension Minimal assist   Expression Moderate assist   Communication D/C Goal Minimal assist   Progress Towards Plan of Care Discharge Goals Progressing   Cognition and/or Safety   Attention Modified independent   Memory Supervision   Problem Solving Minimal assist   Social Interaction Supervision   Safety Awareness Minimal assist   Patient Safety Measures Needed Bed alarm;Chair alarm   Cognition and/or Safety D/C Goal Supervision   Progress Towards Plan of Care Discharge Goals Progressing   Mobility    Bed Mobility Current Minimal assist   Bed Mobility D/C Goal Supervision   Chair Transfer Current Minimal assist   Chair Transfer D/C Goal Supervision   Locomotion Ambulation   Locomotion Primary Device Standard cane   Locomotion Distance (ft) 50 ft   Locomotion Current Moderate assist   Locomotion D/C Goal Supervision   Stairs in Home 2   Railings None   Stairs Attempted 0   Stairs Current To be assessed   Stairs D/C Goal Minimal assist   Progress Towards Plan of Care Discharge Goals Progressing   Self Cares   Grooming Current Moderate assist   Grooming D/C Goal Modified independent   Bathing Current Moderate assist   Bathing D/C Goal Supervision   UE Dressing Current Maximal assist   UE Dressing D/C Goal Modified independent   LE Dressing Current Maximal assist  (to total assist)   LE Dressing D/C Goal Supervision   Toileting Current Total assist   Toileting D/C Goal Modified independent   Toilet Transfer Current Moderate assist  (to max assist)   Toilet Transfer D/C Goal Modified independent   Tub or Shower Transfer Current Moderate assist   Tub or Shower Transfer D/C Goal Minimal assist   Progress Towards Plan of Care Discharge Goals Progressing   Coping and Motivation   Coping/Motivation Other (comment)   Coping Skills Fair   Motivation Motivated with prompting   Coping/Motivation Discharge Goals support as needed   Progress Towards Plan of Care Discharge Goals Maintaining  (monitor)   Home and Community Roles   Home and Community Roles Pt. lives with his parents. Pt. was min assist to modified indep. at home before illness.   Home and Community Discharge Goals To return home safely with parents.   Progress towards plan of care discharge goals Progressing   Recommendations   Re-conference Date 10/19/17   Expected Discharge Date 10/26/17   Planned Discharge Destination Home   Patient and care partner(s), as appropriate, to be informed of plan by the following team member Physiatrist;   Significant  Plan of Care Changes or Interventions seizures occuring, some issues with lethargy following.   Signature Sheet Completed? Yes - to be scanned at discharge      Never

## 2022-07-15 NOTE — H&P ADULT - NSHPPHYSICALEXAM_GEN_ALL_CORE
PHYSICAL EXAM:    Constitutional: cachectic  Eyes: PERRL; EOMI  ENMT: no oral lesions  Neck:  Supple  Respiratory: airway patent; breath sounds equal;+rhonchi  Cardiovascular: regular rate and rhythm  no rub , no murmur, no gallops.   Gastrointestinal: soft; no distention, normal BS, no TTP, no organomegaly, no ascites.  Extremities: no clubbing; no cyanosis; no pedal edema, non-tender to palpation, DP and Radial pulses intact.  Neurological: alert and oriented x 0; responds to pain; does not respond to verbal commands  Skin: warm and dry; color normal: no rash: no ulcers

## 2022-07-15 NOTE — H&P ADULT - VTE RISK ASSESSMENT
Pt reports hearing voices ans seeing things that are not there and \"not good\". Pt also reports suicidal ideations stating her was going to jump off the bridge right before the medics showed up.
<<--- Click to launch

## 2022-07-15 NOTE — H&P ADULT - HISTORY OF PRESENT ILLNESS
87 yo M with PMH pulmonary fibrosis (on home 3L nasal cannula), DM, a fib (on eliquis), dementia (baseline mental status Aox1), chronic constipation, dysphagia, GERD, and malnutrition who presents with altered mental status. Per his daughter at bedside, he has become more confused over the past 2-3 days with increased coughing and sputum as well as abdominal discomfort. No reported fevers. Due to mental status, unable to report if any chills or further ROS. He is not on antifibrotics for pulmonary fibrosis. He is on inhalers.  Of note, he was recently admitted at the end of 6/2022 with pyelonephritis vs stercoral colitis treated with course of antibiotics. He was discharged back to nursing home.

## 2022-07-15 NOTE — ED PROVIDER NOTE - OBJECTIVE STATEMENT
Patient is a 85yo M presenting with abdominal pain, bright red rectal bleeding, and AMS for 1 day. PMH is pertinent for dysphagia, pulmonary fibrosis, HTN, a-fib currently on eliquis. History was provided from the patient's daughter. She states yesterday morning the patient was complaining of abdominal pain. The patient is able to communicate verbally at baseline but has lethargy and decreased mental status since yesterday afternoon, he is currently non-verbal. Daughter is only aware of one episode of bright red rectal bleeding this morning. The daughter also mentions the patient often chokes on food and liquids. Patient is a 87yo M presenting with abdominal pain, bright red rectal bleeding, and AMS for 1 day. PMH is pertinent for dysphagia, pulmonary fibrosis, HTN, a-fib currently on eliquis. History was provided from the patient's daughter. She states yesterday morning the patient was complaining of lower abdominal pain. The patient is able to communicate verbally at baseline but has become lethargic w/ decreased mental status since yesterday afternoon, he is currently not talking. Daughter is only aware of one episode of bright red rectal bleeding this morning. Since becoming lethargic, pt has been choking on food and liquids. Other abd pain, pt was not complaining of anything else hurting but does note he was urinating frequently. no ROS available from pt.

## 2022-07-15 NOTE — H&P ADULT - ATTENDING COMMENTS
Agree with above. Patient seen and examined with the RCU Fellow.    87 yo M with PMH pulmonary fibrosis (on home 3L nasal cannula), DM, a fib (on eliquis), dementia (baseline mental status Aox1), chronic constipation, dysphagia, GERD, and malnutrition who presents with altered mental status and suspicion of GI bleed from nursing home. Found to have pneumothorax incidentally on imaging,    Currently the patient is s/p disimpaction, sepsis improved, normal BP, on baseline O2. However CT of the abn showing small loculated PTX. CXR without PTX. Patient on baseline home O2 without desaturation. Sepsis GI source, vs pna vs UTI workup still pending. RVP negative. Patient also has acute encephalopathy baseline A+Ox2, but currently A+O x0 with lethargy. POCUS without lung point, small anterior area of no lung sliding. Pending full CT. RCU for airway monitoring and monitoring fot PTX.     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   - Bowel regiment when tolerating PO if no improvement will NGT  - Pending CT chest for PTX, Small on US, small on CXR. Will need serial imaging, appear to be small and loculated.   - GOC discussed with daughter at bedside. D/W patient long term prognosis, will likely not do well on mechanical ventilation. She has other siblings that she will need to talk to. Full code for now.

## 2022-07-15 NOTE — ED PROVIDER NOTE - PROGRESS NOTE DETAILS
O'Julita DO PGY-3.  [facility] [place]. Pt w/ hx of pulmonary fibrosis, HTN, a-fib currently on eliquis p/w increased weakness. Daughter at bedside states the pt has been more weak since yesterday after pt received "pain meds". In ED pt is responsive to pain and weak appearing. Pt TBA. Concern for anemia vs intracerebral pathology Carlos PGY2: disimpacted pt with wife at bedside, large amount of soft brown stool with mixed red blood removed from vault. RN unable to get oneill x2 attempts with x2 sizes. US bladder does show large amnt of urine still present will call uro. Carlos PGY2: spoke to RCU, will admit given poor lung status and PTX. Requesting CT NC and admit to RCU.

## 2022-07-15 NOTE — ED PROVIDER NOTE - SKIN, MLM
Skin normal color for race, warm, dry and intact. No evidence of rash. Skin normal color for race, warm, dry ; some ecchymosis and fragile skin noted

## 2022-07-15 NOTE — ED PROVIDER NOTE - ATTENDING CONTRIBUTION TO CARE
I performed a history and physical exam of the patient and discussed their management with the resident and /or advanced care provider. I reviewed the resident and /or ACP's note and agree with the documented findings and plan of care. My medical decision making and observations are found above.  Lungs clear abd soft, sleepy I performed a history and physical exam of the patient and discussed their management with the resident and /or advanced care provider. I reviewed the resident and /or ACP's note and agree with the documented findings and plan of care. My medical decision making and observations are found above.  Lungs clear abd soft, sleepy    [place]: I Darius Briceño MD have reviewed and discussed the medical student's documentation and findings with the student. After personally examining the patient and getting an independent history, my findings have been added to this documentation.

## 2022-07-15 NOTE — ED ADULT NURSE NOTE - OBJECTIVE STATEMENT
pt received in rm 12 non verbal at this time. pt sent from nursing home. per pts daughter, pt alert at baseline and verbal. pt sent to ED for dark stools. as per pts daughter pt c/o abdominal pain since yesterday. pt not answering assessment questions at this time. respirations even and unlabored. 20g iv placed to left wrist. pt arrives with 20g iv to right arm. pt on 3L NC at home, pt placed on 3L NC sating 100%. NSR on monitor.

## 2022-07-15 NOTE — H&P ADULT - NSHPSOCIALHISTORY_GEN_ALL_CORE
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 never smoker.

## 2022-07-15 NOTE — H&P ADULT - ASSESSMENT
87 yo M with PMH pulmonary fibrosis (on home 3L nasal cannula), DM, a fib (on eliquis), dementia (baseline mental status Aox1), chronic constipation, dysphagia, GERD, and malnutrition who presents with altered mental status and suspicion of GI bleed from nursing home. Found to have pneumothorax incidentally on imaging, hemodynamically stable and on home O2 of nasal cannula 4 L. Admitted to RCU for monitoring.      # NEURO  - baseline mental status Aox1 per daughter, now Aox0 and more lethargic, likely due to infection  - NPO pending improvement in mental status   - hold home sertraline while NPO  - home home xanax prn while NPO  - frequent reorientation    # CARDIOVASCULAR: recently dx with a fib   - hold eliquis in setting of possible GI bleed   - hold diltiazem while NPO    # RESPIRATORY: found to have small L PTX incidentally on CT abd. Hemodynamically stable, no increase in O2 requirement.  - continue home nasal cannula 4L  - continue home inhalers: incruse ellipta, pulmicort, duoneb  - was on prednisone 20 mg PO daily, cannot tolerate PO meds currently  - start solumedrol 20 mg daily   - f/u CT chest non con to better assess PTX  - serial CXR: first 4 hours after CT chest done     # INFECTIOUS DISEASE  - WBC 18 with lethargy likely due to infection  - monitor fever curve, wbc count  - s/p 1 dose unasyn 7/15  - start zosyn  - start vancomycin    # GI: hx of colitis, CT AP with significant stool burden, s/p disimpaction in ED 7/15. Also suspicion of GI bleed given dark stools noted in nursing home, although hemoglobin stable at baseline compared to CBC 2 weeks prior.  - continue enema   - hold PO bowel regimen while NPO    # HEME  - sent from nursing home with black stool and suspicion of GI bleed  - hold eliquis (for a fib) in setting of possible bleed  - monitor stool color  - serial CBC     # ENDOCRINE  - hx of DM   - hold repaglinide while inpatient      # GOC  - discussed with daughter at bedside, she will discuss with rest of her siblings and follow up         DVT ppx: hold in setting of possible GI bleed   87 yo M with PMH pulmonary fibrosis (on home 3L nasal cannula), DM, a fib (on eliquis), dementia (baseline mental status Aox1), chronic constipation, dysphagia, GERD, and malnutrition who presents with altered mental status and suspicion of GI bleed from nursing home. Found to have pneumothorax incidentally on imaging, hemodynamically stable and on home O2 of nasal cannula 4 L. Admitted to RCU for monitoring.      # NEURO  - baseline mental status Aox1 per daughter, now Aox0 and more lethargic, likely due to infection  - NPO pending improvement in mental status   - hold home sertraline while NPO  - home home xanax prn while NPO  - frequent reorientation  - CT head given on eliquis    # CARDIOVASCULAR: recently dx with a fib   - hold eliquis in setting of possible GI bleed   - hold diltiazem while NPO    # RESPIRATORY: found to have small L PTX incidentally on CT abd. Hemodynamically stable, no increase in O2 requirement.  - continue home nasal cannula 4L  - continue home inhalers: incruse ellipta, pulmicort, duoneb  - was on prednisone 20 mg PO daily, cannot tolerate PO meds currently  - start solumedrol 20 mg daily   - f/u CT chest non con to better assess PTX  - serial CXR: first 4 hours after CT chest done     # INFECTIOUS DISEASE  - WBC 18 with lethargy likely due to infection  - monitor fever curve, wbc count  - s/p 1 dose unasyn 7/15  - start zosyn  - start vancomycin    # GI: hx of colitis, CT AP with significant stool burden, s/p disimpaction in ED 7/15. Also suspicion of GI bleed given dark stools noted in nursing home, although hemoglobin stable at baseline compared to CBC 2 weeks prior.  - continue enema   - hold PO bowel regimen while NPO  - NPO  - speech and swallow eval  - IVF 50 cc/hr while NPO    # HEME  - sent from nursing home with black stool and suspicion of GI bleed  - hold eliquis (for a fib) in setting of possible bleed  - monitor stool color  - serial CBC     # ENDOCRINE  - hx of DM   - hold repaglinide while inpatient      # GOC  - discussed with daughter at bedside, she will discuss with rest of her siblings and follow up         DVT ppx: hold in setting of possible GI bleed   87 yo M with PMH pulmonary fibrosis (on home 3L nasal cannula), DM, a fib (on eliquis), dementia (baseline mental status Aox1), chronic constipation, dysphagia, GERD, and malnutrition who presents with altered mental status and suspicion of GI bleed from nursing home. Found to have pneumothorax incidentally on imaging, hemodynamically stable and on home O2 of nasal cannula 4 L. Admitted to RCU for monitoring.      # NEURO  - baseline mental status Aox1 per daughter, now Aox0 and more lethargic, likely due to infection  - NPO pending improvement in mental status   - hold home sertraline while NPO  - home home xanax prn while NPO  - frequent reorientation  - CT head given on eliquis    # CARDIOVASCULAR: recently dx with a fib   - hold eliquis in setting of possible GI bleed   - hold diltiazem while NPO    # RESPIRATORY: found to have small L PTX incidentally on CT abd. Hemodynamically stable, no increase in O2 requirement.  - continue home nasal cannula 4L  - continue home inhalers: incruse ellipta, pulmicort, duoneb  - was on prednisone 20 mg PO daily, cannot tolerate PO meds currently  - start solumedrol 20 mg daily   - f/u CT chest non con to better assess PTX  - serial CXR: first 4 hours after CT chest done     # INFECTIOUS DISEASE  - WBC 18 with lethargy likely due to infection  - monitor fever curve, wbc count  - s/p 1 dose unasyn 7/15  - start zosyn 7/15  - start vancomycin 7/15  - follow up UA, urine culture, blood culture     # GI: hx of colitis, CT AP with significant stool burden, s/p disimpaction in ED 7/15. Also suspicion of GI bleed given dark stools noted in nursing home, although hemoglobin stable at baseline compared to CBC 2 weeks prior.  - continue enema   - hold PO bowel regimen while NPO  - NPO  - speech and swallow eval  - IVF 50 cc/hr while NPO    # HEME  - sent from nursing home with black stool and suspicion of GI bleed  - hold eliquis (for a fib) in setting of possible bleed  - monitor stool color  - serial CBC   - coags    # ENDOCRINE  - hx of DM   - hold repaglinide while inpatient      # GOC  - discussed with daughter at bedside, she will discuss with rest of her siblings and follow up         DVT ppx: hold in setting of possible GI bleed   85 yo M with PMH pulmonary fibrosis (on home 3L nasal cannula), DM, a fib (on eliquis), dementia (baseline mental status Aox1), chronic constipation, dysphagia, GERD, and malnutrition who presents with altered mental status and suspicion of GI bleed from nursing home. Found to have pneumothorax incidentally on imaging, hemodynamically stable and on home O2 of nasal cannula 4 L. Admitted to RCU for monitoring.      # NEURO  - baseline mental status Aox1 per daughter, now Aox0 and more lethargic, likely due to infection  - NPO pending improvement in mental status   - hold home sertraline while NPO  - home home xanax prn while NPO  - frequent reorientation  - CT head given on eliquis    # CARDIOVASCULAR: recently dx with a fib   - hold eliquis in setting of possible GI bleed   - hold diltiazem while NPO    # RESPIRATORY: found to have small L PTX incidentally on CT abd. Hemodynamically stable, no increase in O2 requirement.  - continue home nasal cannula 4L  - continue home inhalers: incruse ellipta, pulmicort, duoneb  - was on prednisone 20 mg PO daily, cannot tolerate PO meds currently  - start solumedrol 20 mg daily   - f/u CT chest non con to better assess PTX  - serial CXR: first 4 hours after CT chest done     # INFECTIOUS DISEASE  - WBC 18 with lethargy likely due to infection  - monitor fever curve, wbc count  - s/p 1 dose unasyn 7/15  - start zosyn 7/15  - start vancomycin 7/15  - follow up UA, urine culture, blood culture     # GI: hx of colitis, CT AP with significant stool burden, s/p disimpaction in ED 7/15. Also suspicion of GI bleed given dark stools noted in nursing home, although hemoglobin stable at baseline compared to CBC 2 weeks prior.  - continue enema   - hold PO bowel regimen while NPO  - NPO  - speech and swallow eval  - IVF 50 cc/hr while NPO    # HEME  - sent from nursing home with black stool and suspicion of GI bleed  - hold eliquis (for a fib) in setting of possible bleed  - monitor stool color  - serial CBC   - coags    # ENDOCRINE  - hx of DM   - hold repaglinide while inpatient      # GOC  - discussed with daughter at bedside, she will discuss with rest of her siblings and follow up         DVT ppx: hold in setting of possible GI bleed, SCDs

## 2022-07-15 NOTE — ED ADULT NURSE NOTE - INTERVENTIONS DEFINITIONS
[place] to call system/Call bell, personal items and telephone within reach/Instruct patient to call for assistance/Non-slip footwear when patient is off stretcher/Physically safe environment: no spills, clutter or unnecessary equipment/Stretcher in lowest position, wheels locked, appropriate side rails in place/Monitor gait and stability/Reinforce activity limits and safety measures with patient and family

## 2022-07-16 NOTE — PHYSICAL THERAPY INITIAL EVALUATION ADULT - PATIENT PROFILE REVIEW, REHAB EVAL
No specific activity orders. Spoke with FAISAL Bryant prior to session, pt cleared for PT evaluation./yes

## 2022-07-16 NOTE — PROGRESS NOTE ADULT - ATTENDING COMMENTS
Agree with above. Patient seen and examined with the RCU Fellow.    85 yo M with PMH pulmonary fibrosis (on home 3L nasal cannula), DM, a fib (on eliquis), dementia (baseline mental status Aox1), chronic constipation, dysphagia, GERD, and malnutrition who presents with altered mental status and suspicion of GI bleed from nursing home. Found to have pneumothorax incidentally on imaging,    Currently the patient is s/p disimpaction, sepsis improved, normal BP, on baseline O2. However CT of the abn showing small loculated PTX. CXR without PTX. Patient on baseline home O2 without desaturation. Sepsis GI source, vs pna vs UTI workup still pending. RVP negative. Patient also has acute encephalopathy baseline A+Ox2, but currently A+O x0 with lethargy. POCUS without lung point, small anterior area of no lung sliding. Pending full CT. RCU for airway monitoring and monitoring fot PTX.     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   - Bowel regiment when tolerating PO if no improvement will NGT  - Pending CT chest for PTX, Small on US, small on CXR. Will need serial imaging, appear to be small and loculated.   - GOC discussed with daughter at bedside. D/W patient long term prognosis, will likely not do well on mechanical ventilation. She has other siblings that she will need to talk to. Full code for now. Agree with above. Patient seen and examined     87 yo M with PMH pulmonary fibrosis (on home 3L nasal cannula), DM, a fib (on eliquis), dementia (baseline mental status Aox1), chronic constipation, dysphagia, GERD, and malnutrition who presents with altered mental status and suspicion of GI bleed from nursing home. Found to have pneumothorax incidentally on imaging,    Currently the patient is s/p disimpaction, sepsis improved, normal BP, on baseline O2. However CT of the abn showing small loculated PTX. CXR without PTX. Patient on baseline home O2 without desaturation. Sepsis GI source, vs pna vs UTI workup still pending. RVP negative. Patient also has acute encephalopathy baseline A+Ox2, but currently A+O x0 with lethargy. POCUS without lung point, small anterior area of no lung sliding. Pending full CT. RCU for airway monitoring and monitoring fot PTX.     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   constipation- Bowel regiment when tolerating PO if no improvement will NGT     GI-aspiration precautions/prophylaxis  PTX- s/p CT chest=tiny PTX, NO intervention needed.  Dispo- GOC discussed with daughter at bedside. D/W patient long term prognosis, will likely not do well on mechanical ventilation. She has other siblings that she will need to talk to. Full code for now.  Gurdeep Clayton MD-Pulmonary   [phone]

## 2022-07-16 NOTE — PROGRESS NOTE ADULT - SUBJECTIVE AND OBJECTIVE BOX
CHIEF COMPLAINT:    Interval Events:      OBJECTIVE:  ICU Vital Signs Last 24 Hrs  T(C): 36.8 (2022 04:00), Max: 36.9 (15 Jul 2022 11:16)  T(F): 98.2 (2022 04:00), Max: 98.4 (15 Jul 2022 11:16)  HR: 110 (2022 04:20) (87 - 110)  BP: 148/99 (2022 04:00) (136/79 - 148/99)  BP(mean): 110 (2022 04:00) (97 - 110)    RR: 20 (2022 04:00) (18 - 24)  SpO2: 100% (2022 04:20) (98% - 100%)    O2 Parameters below as of 2022 04:20  Patient On (Oxygen Delivery Method): nasal cannula              07-15 @ 07:01  -  07-16 @ 07:00  --------------------------------------------------------  IN: 1050 mL / OUT: 500 mL / NET: 550 mL      CAPILLARY BLOOD GLUCOSE      POCT Blood Glucose.: 83 mg/dL (2022 05:23)          HOSPITAL MEDICATIONS:  MEDICATIONS  (STANDING):  acetylcysteine 20%  Inhalation 4 milliLiter(s) Inhalation every 12 hours  albuterol/ipratropium for Nebulization 3 milliLiter(s) Nebulizer every 6 hours  buDESOnide    Inhalation Suspension 0.5 milliGRAM(s) Inhalation daily  dextrose 5%. 1000 milliLiter(s) (100 mL/Hr) IV Continuous <Continuous>  dextrose 5%. 1000 milliLiter(s) (50 mL/Hr) IV Continuous <Continuous>  dextrose 50% Injectable 25 Gram(s) IV Push once  dextrose 50% Injectable 12.5 Gram(s) IV Push once  dextrose 50% Injectable 25 Gram(s) IV Push once  glucagon  Injectable 1 milliGRAM(s) IntraMuscular once  insulin lispro (ADMELOG) corrective regimen sliding scale   SubCutaneous every 6 hours  lactated ringers. 1000 milliLiter(s) (50 mL/Hr) IV Continuous <Continuous>  methylPREDNISolone sodium succinate Injectable 20 milliGRAM(s) IV Push daily  piperacillin/tazobactam IVPB.. 3.375 Gram(s) IV Intermittent every 8 hours  polyethylene glycol 3350 17 Gram(s) Oral daily  senna 8.6 milliGRAM(s) Oral Tablet - Peds 1 Tablet(s) Oral at bedtime  sertraline 25 milliGRAM(s) Oral daily  vancomycin  IVPB 750 milliGRAM(s) IV Intermittent every 12 hours    MEDICATIONS  (PRN):  bisacodyl 5 milliGRAM(s) Oral at bedtime PRN Constipation  bisacodyl Suppository 10 milliGRAM(s) Rectal daily PRN Constipation  dextrose Oral Gel 15 Gram(s) Oral once PRN Blood Glucose LESS THAN 70 milliGRAM(s)/deciliter      LABS:                        10.4   13.09 )-----------( 151      ( 2022 06:10 )             33.8     07-15    139  |  101  |  14  ----------------------------<  164<H>  5.0   |  30  |  0.29<L>    Ca    9.5      15 Jul 2022 10:40  Mg     1.80     -15    TPro  5.9<L>  /  Alb  3.0<L>  /  TBili  0.7  /  DBili  x   /  AST  29  /  ALT  30  /  AlkPhos  132<H>  0715    PT/INR - ( 2022 06:10 )   PT: 14.0 sec;   INR: 1.20 ratio         PTT - ( 15 Jul 2022 10:40 )  PTT:24.1 sec  Urinalysis Basic - ( 15 Jul 2022 17:33 )    Color: Yellow / Appearance: Clear / S.043 / pH: x  Gluc: x / Ketone: Negative  / Bili: Negative / Urobili: <2 mg/dL   Blood: x / Protein: 30 mg/dL / Nitrite: Positive   Leuk Esterase: Negative / RBC: 371 /HPF / WBC 5 /HPF   Sq Epi: x / Non Sq Epi: 3 /HPF / Bacteria: Few        Venous Blood Gas:  07-15 @ 10:40  7.40/66/165/41/99.1  VBG Lactate: 1.6       CHIEF COMPLAINT:    Interval Events:      OBJECTIVE:  ICU Vital Signs Last 24 Hrs  T(C): 36.8 (2022 04:00), Max: 36.9 (15 Jul 2022 11:16)  T(F): 98.2 (2022 04:00), Max: 98.4 (15 Jul 2022 11:16)  HR: 110 (2022 04:20) (87 - 110)  BP: 148/99 (2022 04:00) (136/79 - 148/99)  BP(mean): 110 (2022 04:00) (97 - 110)    RR: 20 (2022 04:00) (18 - 24)  SpO2: 100% (2022 04:20) (98% - 100%)    O2 Parameters below as of 2022 04:20  Patient On (Oxygen Delivery Method): nasal cannula              07-15 @ 07:01  -  07-16 @ 07:00  --------------------------------------------------------  IN: 1050 mL / OUT: 500 mL / NET: 550 mL      CAPILLARY BLOOD GLUCOSE      POCT Blood Glucose.: 83 mg/dL (2022 05:23)          HOSPITAL MEDICATIONS:  MEDICATIONS  (STANDING):  acetylcysteine 20%  Inhalation 4 milliLiter(s) Inhalation every 12 hours  albuterol/ipratropium for Nebulization 3 milliLiter(s) Nebulizer every 6 hours  buDESOnide    Inhalation Suspension 0.5 milliGRAM(s) Inhalation daily  dextrose 5%. 1000 milliLiter(s) (100 mL/Hr) IV Continuous <Continuous>  dextrose 5%. 1000 milliLiter(s) (50 mL/Hr) IV Continuous <Continuous>  dextrose 50% Injectable 25 Gram(s) IV Push once  dextrose 50% Injectable 12.5 Gram(s) IV Push once  dextrose 50% Injectable 25 Gram(s) IV Push once  glucagon  Injectable 1 milliGRAM(s) IntraMuscular once  insulin lispro (ADMELOG) corrective regimen sliding scale   SubCutaneous every 6 hours  lactated ringers. 1000 milliLiter(s) (50 mL/Hr) IV Continuous <Continuous>  methylPREDNISolone sodium succinate Injectable 20 milliGRAM(s) IV Push daily  piperacillin/tazobactam IVPB.. 3.375 Gram(s) IV Intermittent every 8 hours  polyethylene glycol 3350 17 Gram(s) Oral daily  senna 8.6 milliGRAM(s) Oral Tablet - Peds 1 Tablet(s) Oral at bedtime  sertraline 25 milliGRAM(s) Oral daily  vancomycin  IVPB 750 milliGRAM(s) IV Intermittent every 12 hours    MEDICATIONS  (PRN):  bisacodyl 5 milliGRAM(s) Oral at bedtime PRN Constipation  bisacodyl Suppository 10 milliGRAM(s) Rectal daily PRN Constipation  dextrose Oral Gel 15 Gram(s) Oral once PRN Blood Glucose LESS THAN 70 milliGRAM(s)/deciliter      LABS:                        10.4   13.09 )-----------( 151      ( 2022 06:10 )             33.8     07-15    139  |  101  |  14  ----------------------------<  164<H>  5.0   |  30  |  0.29<L>    Ca    9.5      15 Jul 2022 10:40  Mg     1.80     07-15    TPro  5.9<L>  /  Alb  3.0<L>  /  TBili  0.7  /  DBili  x   /  AST  29  /  ALT  30  /  AlkPhos  132<H>  07-15    PT/INR - ( 2022 06:10 )   PT: 14.0 sec;   INR: 1.20 ratio         PTT - ( 15 Jul 2022 10:40 )  PTT:24.1 sec  Urinalysis Basic - ( 15 Jul 2022 17:33 )    Color: Yellow / Appearance: Clear / S.043 / pH: x  Gluc: x / Ketone: Negative  / Bili: Negative / Urobili: <2 mg/dL   Blood: x / Protein: 30 mg/dL / Nitrite: Positive   Leuk Esterase: Negative / RBC: 371 /HPF / WBC 5 /HPF   Sq Epi: x / Non Sq Epi: 3 /HPF / Bacteria: Few        Venous Blood Gas:  07-15 @ 10:40  7.40/66/165/41/99.1  VBG Lactate: 1.6    < from: CT Chest No Cont (07.15.22 @ 19:32) >  were reconstructed and reviewed.  COMPARISON: No prior chest CT.    FINDINGS: The quality of images are degraded by motion.    AIRWAYS, LUNGS and PLEURA: The right upper lobe bronchus is opacified   secondary to retained secretions. Upper lung predominant bilateral areas   of reticulations, architectural distortion and traction bronchiectasis   likely representing interstitial lung disease. There are superimposed   patchy and consolidative opacities which are possibly   infectious/inflammatory in etiology.    Trace left pneumothorax. Trace right pleural fusion.    MEDIASTINUM AND NARENDRA: Multiple subcentimeter mediastinal lymph nodes.    HEART AND VESSELS: Heart size is normal. No pericardial effusion.   Thoracic aorta and pulmonary artery normal in diameter. Mild coronary   calcification.    VISUALIZED UPPER ABDOMEN: The right hemidiaphragm is elevated. The   gallbladder is distended.    CHEST WALL AND BONES: No aggressive osseous lesion.    LOWER NECK: Within normal limits.    IMPRESSION:    Upper lung predominant bilateral areas of reticulations, architectural   distortion and traction bronchiectasis likely representing interstitial   lung disease. There are superimposed patchy and consolidative opacities   within all lobes which are possibly infectious/inflammatory in etiology.   Recommend follow-up chest CT in 4 weeks after treatment.    The right upper lobe bronchus is opacified secondary to retained   secretions.    Trace left pneumothorax.    --- End of Report ---            ERNIE MENDEZ MD; Attending Radiologist  This document has been electronically signed. 2022 10:11AM    < end of copied text >     CHIEF COMPLAINT: non productive cough    Interval Events: failed speech/swallow       OBJECTIVE:  ICU Vital Signs Last 24 Hrs  T(C): 36.8 (2022 04:00), Max: 36.9 (15 Jul 2022 11:16)  T(F): 98.2 (2022 04:00), Max: 98.4 (15 Jul 2022 11:16)  HR: 110 (2022 04:20) (87 - 110)  BP: 148/99 (2022 04:00) (136/79 - 148/99)  BP(mean): 110 (2022 04:00) (97 - 110)    RR: 20 (2022 04:00) (18 - 24)  SpO2: 100% (2022 04:20) (98% - 100%)    O2 Parameters below as of 2022 04:20  Patient On (Oxygen Delivery Method): nasal cannula              07-15 @ 07:01  -  07-16 @ 07:00  --------------------------------------------------------  IN: 1050 mL / OUT: 500 mL / NET: 550 mL      CAPILLARY BLOOD GLUCOSE      POCT Blood Glucose.: 83 mg/dL (2022 05:23)          HOSPITAL MEDICATIONS:  MEDICATIONS  (STANDING):  acetylcysteine 20%  Inhalation 4 milliLiter(s) Inhalation every 12 hours  albuterol/ipratropium for Nebulization 3 milliLiter(s) Nebulizer every 6 hours  buDESOnide    Inhalation Suspension 0.5 milliGRAM(s) Inhalation daily  dextrose 5%. 1000 milliLiter(s) (100 mL/Hr) IV Continuous <Continuous>  dextrose 5%. 1000 milliLiter(s) (50 mL/Hr) IV Continuous <Continuous>  dextrose 50% Injectable 25 Gram(s) IV Push once  dextrose 50% Injectable 12.5 Gram(s) IV Push once  dextrose 50% Injectable 25 Gram(s) IV Push once  glucagon  Injectable 1 milliGRAM(s) IntraMuscular once  insulin lispro (ADMELOG) corrective regimen sliding scale   SubCutaneous every 6 hours  lactated ringers. 1000 milliLiter(s) (50 mL/Hr) IV Continuous <Continuous>  methylPREDNISolone sodium succinate Injectable 20 milliGRAM(s) IV Push daily  piperacillin/tazobactam IVPB.. 3.375 Gram(s) IV Intermittent every 8 hours  polyethylene glycol 3350 17 Gram(s) Oral daily  senna 8.6 milliGRAM(s) Oral Tablet - Peds 1 Tablet(s) Oral at bedtime  sertraline 25 milliGRAM(s) Oral daily  vancomycin  IVPB 750 milliGRAM(s) IV Intermittent every 12 hours    MEDICATIONS  (PRN):  bisacodyl 5 milliGRAM(s) Oral at bedtime PRN Constipation  bisacodyl Suppository 10 milliGRAM(s) Rectal daily PRN Constipation  dextrose Oral Gel 15 Gram(s) Oral once PRN Blood Glucose LESS THAN 70 milliGRAM(s)/deciliter      LABS:                        10.4   13.09 )-----------( 151      ( 2022 06:10 )             33.8     -15    139  |  101  |  14  ----------------------------<  164<H>  5.0   |  30  |  0.29<L>    Ca    9.5      15 Jul 2022 10:40  Mg     1.80     07-15    TPro  5.9<L>  /  Alb  3.0<L>  /  TBili  0.7  /  DBili  x   /  AST  29  /  ALT  30  /  AlkPhos  132<H>  07-15    PT/INR - ( 2022 06:10 )   PT: 14.0 sec;   INR: 1.20 ratio         PTT - ( 15 Jul 2022 10:40 )  PTT:24.1 sec  Urinalysis Basic - ( 15 Jul 2022 17:33 )    Color: Yellow / Appearance: Clear / S.043 / pH: x  Gluc: x / Ketone: Negative  / Bili: Negative / Urobili: <2 mg/dL   Blood: x / Protein: 30 mg/dL / Nitrite: Positive   Leuk Esterase: Negative / RBC: 371 /HPF / WBC 5 /HPF   Sq Epi: x / Non Sq Epi: 3 /HPF / Bacteria: Few        Venous Blood Gas:  07-15 @ 10:40  7.40/66/165/41/99.1  VBG Lactate: 1.6    < from: CT Chest No Cont (07.15.22 @ 19:32) >  were reconstructed and reviewed.  COMPARISON: No prior chest CT.    FINDINGS: The quality of images are degraded by motion.    AIRWAYS, LUNGS and PLEURA: The right upper lobe bronchus is opacified   secondary to retained secretions. Upper lung predominant bilateral areas   of reticulations, architectural distortion and traction bronchiectasis   likely representing interstitial lung disease. There are superimposed   patchy and consolidative opacities which are possibly   infectious/inflammatory in etiology.    Trace left pneumothorax. Trace right pleural fusion.    MEDIASTINUM AND NARENDRA: Multiple subcentimeter mediastinal lymph nodes.    HEART AND VESSELS: Heart size is normal. No pericardial effusion.   Thoracic aorta and pulmonary artery normal in diameter. Mild coronary   calcification.    VISUALIZED UPPER ABDOMEN: The right hemidiaphragm is elevated. The   gallbladder is distended.    CHEST WALL AND BONES: No aggressive osseous lesion.    LOWER NECK: Within normal limits.    IMPRESSION:    Upper lung predominant bilateral areas of reticulations, architectural   distortion and traction bronchiectasis likely representing interstitial   lung disease. There are superimposed patchy and consolidative opacities   within all lobes which are possibly infectious/inflammatory in etiology.   Recommend follow-up chest CT in 4 weeks after treatment.    The right upper lobe bronchus is opacified secondary to retained   secretions.    Trace left pneumothorax.    --- End of Report ---            ERNIE MENDEZ MD; Attending Radiologist  This document has been electronically signed. 2022 10:11AM    < end of copied text >     CHIEF COMPLAINT: non productive cough    Interval Events: failed speech/swallow       OBJECTIVE:  ICU Vital Signs Last 24 Hrs  T(C): 36.8 (2022 04:00), Max: 36.9 (15 Jul 2022 11:16)  T(F): 98.2 (2022 04:00), Max: 98.4 (15 Jul 2022 11:16)  HR: 110 (2022 04:20) (87 - 110)  BP: 148/99 (2022 04:00) (136/79 - 148/99)  BP(mean): 110 (2022 04:00) (97 - 110)    RR: 20 (2022 04:00) (18 - 24)  SpO2: 100% (2022 04:20) (98% - 100%)    O2 Parameters below as of 2022 04:20  Patient On (Oxygen Delivery Method): nasal cannula        07-15 @ 07:01  -  07-16 @ 07:00  --------------------------------------------------------  IN: 1050 mL / OUT: 500 mL / NET: 550 mL      CAPILLARY BLOOD GLUCOSE      POCT Blood Glucose.: 83 mg/dL (2022 05:23)          HOSPITAL MEDICATIONS:  MEDICATIONS  (STANDING):  acetylcysteine 20%  Inhalation 4 milliLiter(s) Inhalation every 12 hours  albuterol/ipratropium for Nebulization 3 milliLiter(s) Nebulizer every 6 hours  buDESOnide    Inhalation Suspension 0.5 milliGRAM(s) Inhalation daily  dextrose 5%. 1000 milliLiter(s) (100 mL/Hr) IV Continuous <Continuous>  dextrose 5%. 1000 milliLiter(s) (50 mL/Hr) IV Continuous <Continuous>  dextrose 50% Injectable 25 Gram(s) IV Push once  dextrose 50% Injectable 12.5 Gram(s) IV Push once  dextrose 50% Injectable 25 Gram(s) IV Push once  glucagon  Injectable 1 milliGRAM(s) IntraMuscular once  insulin lispro (ADMELOG) corrective regimen sliding scale   SubCutaneous every 6 hours  lactated ringers. 1000 milliLiter(s) (50 mL/Hr) IV Continuous <Continuous>  methylPREDNISolone sodium succinate Injectable 20 milliGRAM(s) IV Push daily  piperacillin/tazobactam IVPB.. 3.375 Gram(s) IV Intermittent every 8 hours  polyethylene glycol 3350 17 Gram(s) Oral daily  senna 8.6 milliGRAM(s) Oral Tablet - Peds 1 Tablet(s) Oral at bedtime  sertraline 25 milliGRAM(s) Oral daily  vancomycin  IVPB 750 milliGRAM(s) IV Intermittent every 12 hours    MEDICATIONS  (PRN):  bisacodyl 5 milliGRAM(s) Oral at bedtime PRN Constipation  bisacodyl Suppository 10 milliGRAM(s) Rectal daily PRN Constipation  dextrose Oral Gel 15 Gram(s) Oral once PRN Blood Glucose LESS THAN 70 milliGRAM(s)/deciliter      LABS:                        10.4   13.09 )-----------( 151      ( 2022 06:10 )             33.8     -15    139  |  101  |  14  ----------------------------<  164<H>  5.0   |  30  |  0.29<L>    Ca    9.5      15 Jul 2022 10:40  Mg     1.80     07-15    TPro  5.9<L>  /  Alb  3.0<L>  /  TBili  0.7  /  DBili  x   /  AST  29  /  ALT  30  /  AlkPhos  132<H>  07-15    PT/INR - ( 2022 06:10 )   PT: 14.0 sec;   INR: 1.20 ratio         PTT - ( 15 Jul 2022 10:40 )  PTT:24.1 sec  Urinalysis Basic - ( 15 Jul 2022 17:33 )    Color: Yellow / Appearance: Clear / S.043 / pH: x  Gluc: x / Ketone: Negative  / Bili: Negative / Urobili: <2 mg/dL   Blood: x / Protein: 30 mg/dL / Nitrite: Positive   Leuk Esterase: Negative / RBC: 371 /HPF / WBC 5 /HPF   Sq Epi: x / Non Sq Epi: 3 /HPF / Bacteria: Few        Venous Blood Gas:  07-15 @ 10:40  7.40/66/165/41/99.1  VBG Lactate: 1.6    < from: CT Chest No Cont (07.15.22 @ 19:32) >  were reconstructed and reviewed.  COMPARISON: No prior chest CT.    FINDINGS: The quality of images are degraded by motion.    AIRWAYS, LUNGS and PLEURA: The right upper lobe bronchus is opacified   secondary to retained secretions. Upper lung predominant bilateral areas   of reticulations, architectural distortion and traction bronchiectasis   likely representing interstitial lung disease. There are superimposed   patchy and consolidative opacities which are possibly   infectious/inflammatory in etiology.    Trace left pneumothorax. Trace right pleural fusion.    MEDIASTINUM AND NARENDRA: Multiple subcentimeter mediastinal lymph nodes.    HEART AND VESSELS: Heart size is normal. No pericardial effusion.   Thoracic aorta and pulmonary artery normal in diameter. Mild coronary   calcification.    VISUALIZED UPPER ABDOMEN: The right hemidiaphragm is elevated. The   gallbladder is distended.    CHEST WALL AND BONES: No aggressive osseous lesion.    LOWER NECK: Within normal limits.    IMPRESSION:    Upper lung predominant bilateral areas of reticulations, architectural   distortion and traction bronchiectasis likely representing interstitial   lung disease. There are superimposed patchy and consolidative opacities   within all lobes which are possibly infectious/inflammatory in etiology.   Recommend follow-up chest CT in 4 weeks after treatment.    The right upper lobe bronchus is opacified secondary to retained   secretions.    Trace left pneumothorax.    --- End of Report ---            ERNIE MENDEZ MD; Attending Radiologist  This document has been electronically signed. 2022 10:11AM    < end of copied text >

## 2022-07-16 NOTE — SWALLOW BEDSIDE ASSESSMENT ADULT - ADDITIONAL RECOMMENDATIONS
1. Meticulous oral care in order to prevent colonization of bacteria in oral cavity   2. This service to follow up as schedule permits. Reconsult this service if status change/ as patient is medically optimized

## 2022-07-16 NOTE — PROGRESS NOTE ADULT - ASSESSMENT
85 yo M with PMH pulmonary fibrosis (on home 3L nasal cannula), DM, a fib (on eliquis), dementia (baseline mental status Aox1), chronic constipation, dysphagia, GERD, and malnutrition who presents with altered mental status and suspicion of GI bleed from nursing home. Found to have pneumothorax incidentally on imaging, hemodynamically stable and on home O2 of nasal cannula 4 L. Admitted to [facility] for monitoring.      # NEURO  - baseline mental status Aox1 per daughter, now Aox0 and more lethargic, likely due to infection  - NPO pending improvement in mental status   - hold home sertraline while NPO  - home home xanax prn while NPO  - frequent reorientation  - CT head given on eliquis    # CARDIOVASCULAR: recently dx with a fib   - hold eliquis in setting of possible GI bleed   - hold diltiazem while NPO    # RESPIRATORY: found to have small L PTX incidentally on CT abd. Hemodynamically stable, no increase in O2 requirement.  - continue home nasal cannula 4L  - continue home inhalers: incruse ellipta, pulmicort, duoneb  - was on prednisone 20 mg PO daily, cannot tolerate PO meds currently  - start solumedrol 20 mg daily   - f/u CT chest non con to better assess PTX  - serial CXR: first 4 hours after CT chest done     # INFECTIOUS DISEASE  - WBC 18 with lethargy likely due to infection  - monitor fever curve, wbc count  - s/p 1 dose unasyn 7/15  - start zosyn 7/15  - start vancomycin 7/15  - follow up UA, urine culture, blood culture     # GI: hx of colitis, CT AP with significant stool burden, s/p disimpaction in ED 7/15. Also suspicion of GI bleed given dark stools noted in nursing home, although hemoglobin stable at baseline compared to CBC 2 weeks prior.  - continue enema   - hold PO bowel regimen while NPO  - NPO  - speech and swallow eval  - IVF 50 cc/hr while NPO    # HEME  - sent from nursing home with black stool and suspicion of GI bleed  - hold eliquis (for a fib) in setting of possible bleed  - monitor stool color  - serial CBC   - coags    # ENDOCRINE  - hx of DM   - hold repaglinide while inpatient      # GOC  - discussed with daughter at bedside, she will discuss with rest of her siblings and follow up         DVT ppx: hold in setting of possible GI bleed, SCDs   85 yo M with PMH pulmonary fibrosis (on home 3L nasal cannula), DM, a fib (on eliquis), dementia (baseline mental status Aox1), chronic constipation, dysphagia, GERD, and malnutrition who presents with altered mental status and suspicion of GI bleed from nursing home. Found to have pneumothorax incidentally on imaging, hemodynamically stable and on home O2 of nasal cannula 4 L. Admitted to [facility] for monitoring.      # NEURO  - baseline mental status Aox1 per daughter, now Aox0 and more lethargic, likely due to infection  - NPO pending improvement in mental status   - hold home sertraline while NPO  - home home xanax prn while NPO  - frequent reorientation  - CT head given on eliquis    # CARDIOVASCULAR: recently dx with a fib   - hold eliquis in setting of possible GI bleed   - hold diltiazem while NPO    # RESPIRATORY: found to have small L PTX incidentally on CT abd. Hemodynamically stable, no increase in O2 requirement.  - continue home nasal cannula 4L  - continue home inhalers: incruse ellipta, pulmicort, duoneb  - was on prednisone 20 mg PO daily, cannot tolerate PO meds currently  - start solumedrol 20 mg daily   - f/u CT chest non con to better assess PTX  - serial CXR: first 4 hours after CT chest done     # INFECTIOUS DISEASE  - WBC 18 with lethargy likely due to infection  - monitor fever curve, wbc count  - s/p 1 dose unasyn 7/15  - started zosyn 7/15 vancomycin 7/15  - follow up UA, urine culture, blood culture     # GI: hx of colitis, CT AP with significant stool burden, s/p disimpaction in ED 7/15. Also suspicion of GI bleed given dark stools noted in nursing home, although hemoglobin stable at baseline compared to CBC 2 weeks prior.  - continue enema   - hold PO bowel regimen while NPO  - NPO  - speech and swallow eval  - IVF 50 cc/hr while NPO    # HEME  - sent from nursing home with black stool and suspicion of GI bleed  - hold eliquis (for a fib) in setting of possible bleed  - monitor stool color  - serial CBC   - coags    # ENDOCRINE  - hx of DM   - hold repaglinide while inpatient      # GOC  - discussed with daughter at bedside, she will discuss with rest of her siblings and follow up         DVT ppx: hold in setting of possible GI bleed, SCDs   87 yo M with PMH pulmonary fibrosis (on home 3L nasal cannula), DM, a fib (on eliquis), dementia (baseline mental status Aox1), chronic constipation, dysphagia, GERD, and malnutrition who presents with altered mental status and suspicion of GI bleed from nursing home. Found to have pneumothorax incidentally on imaging, hemodynamically stable and on home O2 of nasal cannula 4 L. Admitted to RCU for monitoring.      # NEURO  - baseline mental status Aox1 per daughter,   - NPO- 7/16 failed Speech and swallow eval- daughter declined feeding tube for now, wants to re eval in AM - hold home sertraline while NPO  - Hold home xanax prn while NPO  - frequent reorientation  - CT head -No acute transcortical infarct or intracranial hemorrhage. White matter small vessel disease.    # CARDIOVASCULAR: recently dx with a fib   - hold eliquis in setting of possible GI bleed   - hold diltiazem while NPO    # RESPIRATORY: found to have small L PTX incidentally on CT abd. Hemodynamically stable, no increase in O2 requirement.  - continue home nasal cannula 4L  - continue home inhalers: incruse ellipta, pulmicort, duoneb  - was on prednisone 20 mg PO daily, cannot tolerate PO meds currently  - start solumedrol 20 mg daily   -  CT chest as noted avove  - serial CXR: first 4 hours after CT chest done     # INFECTIOUS DISEASE  - WBC 18 with lethargy likely due to infection  - monitor fever curve, wbc count  - s/p 1 dose unasyn 7/15  - started zosyn 7/15 vancomycin 7/15- f/u vanco trough   - follow up UA, urine culture, blood culture     # GI: hx of colitis, CT AP with significant stool burden, s/p disimpaction in ED 7/15. Also suspicion of GI bleed given dark stools noted in nursing home, although hemoglobin stable at baseline compared to CBC 2 weeks prior.  - continue enema   - hold PO bowel regimen while NPO  - NPO  - failed speech and swallow eval 7/16- re eval in AM  - IVF 50 cc/hr while NPO    # HEME  - sent from nursing home with black stool and suspicion of GI bleed  - hold eliquis (for a fib) in setting of possible bleed  - monitor stool color  - serial CBC   - coags    # ENDOCRINE  - hx of DM   - hold repaglinide while inpatient      # GOC  - discussed with daughter at bedside, she will discuss with rest of her siblings and follow up         DVT ppx: hold in setting of possible GI bleed, SCDs   87 yo M with PMH pulmonary fibrosis (on home 3L nasal cannula), DM, a fib (on eliquis), dementia (baseline mental status Aox1), chronic constipation, dysphagia, GERD, and malnutrition who presents with altered mental status and suspicion of GI bleed from nursing home. Found to have pneumothorax incidentally on imaging, hemodynamically stable and on home O2 of nasal cannula 4 L. Admitted to RCU for monitoring.      # NEURO  - baseline mental status Aox1 per daughter,   - NPO- 7/16 failed Speech and swallow eval- daughter declined feeding tube for now, wants to re eval in AM - hold home sertraline while NPO  - Hold home xanax prn while NPO  - frequent reorientation  - CT head -No acute transcortical infarct or intracranial hemorrhage. White matter small vessel disease.    # CARDIOVASCULAR: recently dx with a fib   - hold eliquis in setting of possible GI bleed   - hold diltiazem while NPO, family declined NGT, d/w Dr Clayton will give IV Metoprolol 2.5 mg Q8 while NPO, monitor HR    # RESPIRATORY: found to have small L PTX incidentally on CT abd. Hemodynamically stable, no increase in O2 requirement.  - continue home nasal cannula 4L  - continue home inhalers: incruse ellipta, pulmicort, duoneb  - was on prednisone 20 mg PO daily, cannot tolerate PO meds currently  - start solumedrol 20 mg daily   -  CT chest as noted avove  - serial CXR: first 4 hours after CT chest done     # INFECTIOUS DISEASE  - WBC 18 with lethargy likely due to infection  - monitor fever curve, wbc count  - s/p 1 dose unasyn 7/15  - started zosyn 7/15 vancomycin 7/15- f/u vanco trough   - follow up UA, urine culture, blood culture     # GI: hx of colitis, CT AP with significant stool burden, s/p disimpaction in ED 7/15. Also suspicion of GI bleed given dark stools noted in nursing home, although hemoglobin stable at baseline compared to CBC 2 weeks prior.  - continue enema   - hold PO bowel regimen while NPO  - NPO  - failed speech and swallow eval 7/16- re eval in AM  - IVF 50 cc/hr while NPO    # HEME  - sent from nursing home with black stool and suspicion of GI bleed  - hold eliquis (for a fib) in setting of possible bleed  - monitor stool color  - serial CBC   - coags    # ENDOCRINE  - hx of DM   - hold repaglinide while inpatient      # GOC  - discussed with daughter at bedside, she will discuss with rest of her siblings and follow up         DVT ppx: hold in setting of possible GI bleed, SCDs

## 2022-07-16 NOTE — PHYSICAL THERAPY INITIAL EVALUATION ADULT - ADDITIONAL COMMENTS
Pt admitted from nursing home. As per family at bedside, at nursing home pt was transferring bed to chair/wheelchair with assistance and was ambulating minimally at bedside only with assistance. Pt required assistance for ADLs at the nursing home.    Following evaluation, pt was left semireclined in bed in no distress, all lines in tact, call bell in reach. RN aware.

## 2022-07-16 NOTE — PHYSICAL THERAPY INITIAL EVALUATION ADULT - PERTINENT HX OF CURRENT PROBLEM, REHAB EVAL
86 year old male presents with altered mental status and suspicion of GI bleed from nursing home. Found to have pneumothorax incidentally on imaging, hemodynamically stable and on home O2 of nasal cannula 4 L. Admitted to RCU for monitoring.

## 2022-07-16 NOTE — SWALLOW BEDSIDE ASSESSMENT ADULT - ASR SWALLOW REFERRAL
as patient is at increased nutritional risk, consider ENT consult at MD discretion if hoarse vocal quality persists/Registered Dietitian

## 2022-07-17 NOTE — PROGRESS NOTE ADULT - TIME BILLING
Reviewing the EMR, vitals, imaging, medication list, recent labs, prior records and coordinating care with medical providers
Reviewing the EMR, vitals, imaging, medication list, recent labs, prior records and coordinating care with medical providers

## 2022-07-17 NOTE — PROGRESS NOTE ADULT - ASSESSMENT
87 yo M with PMH pulmonary fibrosis (on home 3L nasal cannula), DM, a fib (on eliquis), dementia (baseline mental status Aox1), chronic constipation, dysphagia, GERD, and malnutrition who presents with altered mental status and suspicion of GI bleed from nursing home. Found to have pneumothorax incidentally on imaging, hemodynamically stable and on home O2 of nasal cannula 4 L. Admitted to [facility] for monitoring.      # NEURO  - baseline mental status Aox1 per daughter,   - NPO- 7/16 failed Speech and swallow eval- daughter declined feeding tube for now, wants to re eval in AM - hold home sertraline while NPO  - Hold home xanax prn while NPO  - frequent reorientation  - CT head -No acute transcortical infarct or intracranial hemorrhage. White matter small vessel disease.    # CARDIOVASCULAR: recently dx with a fib   - hold eliquis in setting of possible GI bleed   - hold diltiazem while NPO, family declined NGT, d/w Dr Clayton will give IV Metoprolol 2.5 mg Q8 while NPO, monitor HR    # RESPIRATORY: found to have small L PTX incidentally on CT abd. Hemodynamically stable, no increase in O2 requirement.  - continue home nasal cannula 4L  - continue home inhalers: incruse ellipta, pulmicort, duoneb  - was on prednisone 20 mg PO daily, cannot tolerate PO meds currently  - start solumedrol 20 mg daily   -  CT chest as noted avove  - serial CXR: first 4 hours after CT chest done     # INFECTIOUS DISEASE  - WBC 18 with lethargy likely due to infection  - monitor fever curve, wbc count  - s/p 1 dose unasyn 7/15  - c/w zosyn 7/15 vancomycin 7/15- f/u vanco trough   - MRSA/MSSA nasal PCR pending   - follow up UA, urine culture, blood culture     # GI: hx of colitis, CT AP with significant stool burden, s/p disimpaction in ED 7/15. Also suspicion of GI bleed given dark stools noted in nursing home, although hemoglobin stable at baseline compared to CBC 2 weeks prior.  - continue enema   - hold PO bowel regimen while NPO  - NPO  - failed speech and swallow eval 7/16- re eval in AM  - IVF 50 cc/hr while NPO    # HEME  - sent from nursing home with black stool and suspicion of GI bleed  - hold eliquis (for a fib) in setting of possible bleed  - monitor stool color  - serial CBC   - coags    # ENDOCRINE  - hx of DM   - hold repaglinide while inpatient    # GOC  - discussed with daughter at bedside, she will discuss with rest of her siblings and follow up     DVT ppx: hold in setting of possible GI bleed, SCDs   85 yo M with PMH pulmonary fibrosis (on home 3L nasal cannula), DM, a fib (on eliquis), dementia (baseline mental status Aox1), chronic constipation, dysphagia, GERD, and malnutrition who presents with altered mental status and suspicion of GI bleed from nursing home. Found to have pneumothorax incidentally on imaging, hemodynamically stable and on home O2 of nasal cannula 4 L. Admitted to RCU for monitoring.      # NEURO  - baseline mental status Aox1 per daughter,   - NPO- 7/16 failed Speech and swallow eval- daughter declined feeding tube for now,   - Pending another eval with Speech and swallow -   - hold home sertraline and home xanax prn while NPO  - frequent reorientation  - CT head -No acute transcortical infarct or intracranial hemorrhage. White matter small vessel disease.    # CARDIOVASCULAR: recently dx with a fib   - hold eliquis in setting of possible GI bleed   - hold diltiazem while NPO, family declined NGT, d/w Dr Clayton  - c/w IV Metoprolol 2.5 mg Q8 while NPO, monitor HR  - Pt c/o chest pain today, Cardiac enzymes neg, and EGK sinus tach    # RESPIRATORY: found to have small L PTX incidentally on CT abd. Hemodynamically stable, no increase in O2 requirement.  - continue home nasal cannula 4L  - continue home inhalers: incruse ellipta, pulmicort - Duoneb changed to Xopenex - HR much improved   - was on prednisone 20 mg PO daily, cannot tolerate PO meds currently  - start solumedrol 20 mg daily   - CT chest as noted avove  - Rpt CXR - Stable small left pneumothorax and diffuse bilateral patchy and reticular opacities.    # INFECTIOUS DISEASE  - Leukocytosis downtrending 18>>13>>12 with lethargy likely due to infection  - monitor fever curve, wbc count  - s/p 1 dose unasyn 7/15  - c/w zosyn 7/15 vancomycin 7/15- Vanco trough 11.6  - MRSA/MSSA nasal PCR pending   - UA+/ urine cx >100k GNR, blood culture NGTD    # GI: hx of colitis, CT AP with significant stool burden, s/p disimpaction in ED 7/15. Also suspicion of GI bleed given dark stools noted in nursing home, although hemoglobin stable at baseline compared to CBC 2 weeks prior.  - continue enema   - hold PO bowel regimen while NPO  - Failed speech and swallow eval 7/16  - Pending rpt speech and swallow  - IVF 50 cc/hr while NPO    # HEME  - sent from nursing home with black stool and suspicion of GI bleed  - hold Eliquis (for a fib) in setting of possible bleed  - monitor stool color  - serial CBC   - coags    # ENDOCRINE  - hx of DM   - hold repaglinide while inpatient    #   +Varma - monitor UOP closely  - Otherwise, no active issues     # GOC  - discussed with daughter at bedside, she will discuss with rest of her siblings and follow up     DVT ppx: hold in setting of possible GI bleed, SCDs

## 2022-07-17 NOTE — PROGRESS NOTE ADULT - ATTENDING COMMENTS
Agree with above. Patient seen and examined     85 yo M with PMH pulmonary fibrosis (on home 3L nasal cannula), DM, a fib (on eliquis), dementia (baseline mental status Aox1), chronic constipation, dysphagia, GERD, and malnutrition who presents with altered mental status and suspicion of GI bleed from nursing home. Found to have pneumothorax incidentally on imaging,    Currently the patient is s/p disimpaction, sepsis improved, normal BP, on baseline O2. However CT of the abn showing small loculated PTX. CXR without PTX. Patient on baseline home O2 without desaturation. Sepsis GI source, vs pna vs UTI workup still pending. RVP negative. Patient also has acute encephalopathy baseline A+Ox2, but currently A+O x0 with lethargy. POCUS without lung point, small anterior area of no lung sliding. Pending full CT. RCU for airway monitoring and monitoring fot PTX.     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   constipation- Bowel regiment when tolerating PO if no improvement will NGT     GI-aspiration precautions/prophylaxis  PTX- s/p CT chest=tiny PTX, NO intervention needed.  Dispo- GOC discussed with daughter at bedside. D/W patient long term prognosis, will likely not do well on mechanical ventilation. She has other siblings that she will need to talk to. Full code for now.  Gurdeep Clayton MD-Pulmonary   [phone] Agree with above. Patient seen and examined   7/16-HR issues-placed on metoprolol 2.5 q 8; some CP-CXR ordered-await read    87 yo M with PMH pulmonary fibrosis (on home 3L nasal cannula), DM, a fib (on eliquis), dementia (baseline mental status Aox1), chronic constipation, dysphagia, GERD, and malnutrition who presents with altered mental status and suspicion of GI bleed from nursing home. Found to have pneumothorax incidentally on imaging,    Currently the patient is s/p disimpaction, sepsis improved, normal BP, on baseline O2. However CT of the abn showing small loculated PTX. CXR without PTX. Patient on baseline home O2 without desaturation. Sepsis GI source, vs pna vs UTI workup still pending. RVP negative. Patient also has acute encephalopathy baseline A+Ox2, but currently A+O x0 with lethargy. POCUS without lung point, small anterior area of no lung sliding. Pending full CT. RCU for airway monitoring and monitoring fot PTX.     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 feeding tube placed  RESP- C/W bronchodilators, home dose of steroids, wean O2 as needed; aerobika/vest, incentive spirometry  Neuro- Pending CT for AMS in setting of full A/C.   GI-aspiration precautions/prophylaxis-swallow evaln pending 7/17              constipation -not signif issue at current today  PTX- s/p CT chest=tiny PTX, NO intervention needed--f/up CXR pending  Dispo- GOC discussed with daughter at bedside. D/W patient long term prognosis, will likely not do well on mechanical ventilation. She has other siblings that she will need to talk to. Full code for now.  Gurdeep Clayton MD-Pulmonary   [phone]
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
## 2022-07-18 NOTE — SWALLOW BEDSIDE ASSESSMENT ADULT - SWALLOW EVAL: RECOMMENDED DIET
1) Consideration for short term non-oral means of nutrition/hydration/medication as PO intake is contraindicated at this time
puree with moderately thick liquids
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
## 2022-07-18 NOTE — PROGRESS NOTE ADULT - ASSESSMENT
87 yo M with PMH pulmonary fibrosis (on home 3L nasal cannula), DM, a fib (on eliquis), dementia (baseline mental status Aox1), chronic constipation, dysphagia, GERD, and malnutrition who presents with altered mental status and suspicion of GI bleed from nursing home. Found to have pneumothorax incidentally on imaging, hemodynamically stable and on home O2 of nasal cannula 4 L. Admitted to [facility] for monitoring.      # NEURO  - baseline mental status Aox1 per daughter,   - NPO- 7/16 failed Speech and swallow eval- daughter declined feeding tube for now,   - Pending another eval with Speech and swallow -   - hold home sertraline and home xanax prn while NPO  - frequent reorientation  - CT head -No acute transcortical infarct or intracranial hemorrhage. White matter small vessel disease.    # CARDIOVASCULAR: recently dx with a fib   - hold eliquis in setting of possible GI bleed   - hold diltiazem while NPO, family declined NGT, d/w Dr Clayton  - c/w IV Metoprolol 2.5 mg Q8 while NPO, monitor HR  - Pt c/o chest pain today, Cardiac enzymes neg, and EGK sinus tach    # RESPIRATORY: found to have small L PTX incidentally on CT abd. Hemodynamically stable, no increase in O2 requirement.  - continue home nasal cannula 4L  - continue home inhalers: incruse ellipta, pulmicort - Duoneb changed to Xopenex - HR much improved   - was on prednisone 20 mg PO daily, cannot tolerate PO meds currently  - start solumedrol 20 mg daily   - CT chest as noted avove  - Rpt CXR - Stable small left pneumothorax and diffuse bilateral patchy and reticular opacities.    # INFECTIOUS DISEASE  - Leukocytosis downtrending 18>>13>>12 with lethargy likely due to infection  - monitor fever curve, wbc count  - s/p 1 dose unasyn 7/15  - c/w zosyn 7/15 vancomycin 7/15- Vanco trough 11.6  - MRSA/MSSA nasal PCR pending   - UA+/ urine cx >100k GNR, blood culture NGTD    # GI: hx of colitis, CT AP with significant stool burden, s/p disimpaction in ED 7/15. Also suspicion of GI bleed given dark stools noted in nursing home, although hemoglobin stable at baseline compared to CBC 2 weeks prior.  - continue enema   - hold PO bowel regimen while NPO  - Failed speech and swallow eval 7/16  - Pending rpt speech and swallow  - IVF 50 cc/hr while NPO    # HEME  - sent from nursing home with black stool and suspicion of GI bleed  - hold Eliquis (for a fib) in setting of possible bleed  - monitor stool color  - serial CBC   - coags    # ENDOCRINE  - hx of DM   - hold repaglinide while inpatient    #   +Varma - monitor UOP closely  - Otherwise, no active issues     # GOC  - discussed with daughter at bedside, she will discuss with rest of her siblings and follow up     DVT ppx: hold in setting of possible GI bleed, SCDs   85 yo M with PMH pulmonary fibrosis (on home 3L nasal cannula), DM, a fib (on eliquis), dementia (baseline mental status Aox1), chronic constipation, dysphagia, GERD, and malnutrition who presents with altered mental status and suspicion of GI bleed from nursing home. Found to have pneumothorax incidentally on imaging, hemodynamically stable and on home O2 of nasal cannula 4 L. Admitted to RCU for monitoring.      # NEURO  - baseline mental status Aox1 per daughter,   - c/w home sertraline and home xanax prn   - frequent reorientation  - CT head -No acute transcortical infarct or intracranial hemorrhage. White matter small vessel disease.    # CARDIOVASCULAR: recently dx with a fib   - hold Eliquis in setting of possible GI bleed   - c/w Cardizem 240mg PO daily  - Monitor HR/BP closely    # RESPIRATORY: found to have small L PTX incidentally on CT abd. Hemodynamically stable, no increase in O2 requirement.  - continue home nasal cannula 4L  - continue home inhalers: incruse ellipta, pulmicort, and Hypersal  - c/w Xopenex - HR much improved   - d'storm solumedrol 20 mg daily on 7/18  - CT chest as noted avove  - Rpt CXR - Stable small left pneumothorax and diffuse bilateral patchy and reticular opacities.    # INFECTIOUS DISEASE  - Leukocytosis downtrending 18>>13>>12>>10 with lethargy likely due to infection  - monitor fever curve, wbc count  - s/p 1 dose unasyn 7/15  - c/w zosyn 7/15 - vancomycin d'storm on 7/18 - MRSA PCR neg  - Staph areus + - started on Bactroban 1app BID for 5 days   - UA+/ urine cx >100k GNR, blood culture NGTD    # GI: hx of colitis, CT AP with significant stool burden, s/p disimpaction in ED 7/15. Also suspicion of GI bleed given dark stools noted in nursing home, although hemoglobin stable at baseline compared to CBC 2 weeks prior.  - continue enema   - Rpt Speech and Swallow done 7/18 - Pureed with Moderate thick liquid    # HEME  - sent from nursing home with black stool and suspicion of GI bleed  - hold Eliquis (for a fib) in setting of possible bleed  - monitor stool color  - serial CBC   +FOBT, GI emailed for further evaluation    # ENDOCRINE  - hx of DM   - hold repaglinide while inpatient    #   +Varma - monitor UOP closely  - Otherwise, no active issues     # GOC  - discussed with daughter at bedside, she will discuss with rest of her siblings and follow up     DVT ppx: hold in setting of possible GI bleed, SCDs

## 2022-07-18 NOTE — SWALLOW BEDSIDE ASSESSMENT ADULT - SWALLOW EVAL: DIAGNOSIS
Mild oral stage deficits given puree and moderately thick liquids marked by adequate bolus containment, prolonged bolus manipulation, prolonged posterior transport and adequate oral clearance post primary swallow. Pharyngeal stage marked by observed initiation of the pharyngeal swallow trigger judged via laryngeal palpation. No overt s/sx of impaired airway protection observed for all trials. Of note, thin liquids not provided as patient's family declined stating patient will "choke". Mildly  thick liquids not provided as patient's level of alertness began to decrease as evaluation progressed.
1. Functional oral phase for pureed marked by adequate oral aperture and bolus collection with timely A-P transport. 2. Severe pharyngeal dysphagia for pureed marked by suspected delay in the pharyngeal swallow with hyolaryngeal elevation and excursion upon digital palpation. There were multiple swallows for teaspoon of pureed indicative of pharyngeal stasis. Patient with immediate and prolonged cough response after pureed, indicative of laryngeal penetration/aspiration.

## 2022-07-18 NOTE — SWALLOW BEDSIDE ASSESSMENT ADULT - ASR SWALLOW RECOMMEND DIAG
Cinesophagram NOT indicated on this time given overt signs and symptoms of penetration/aspiration on pureed textures
cinesophagram is recommended to objectively assess swallow mechanism given recent chest imaging, elevated white blood cell count and history of dysphagia/VFSS/MBS

## 2022-07-18 NOTE — SWALLOW BEDSIDE ASSESSMENT ADULT - COMMENTS
As per Pulmonology Progress Note 7/16/22 "85 yo M with PMH pulmonary fibrosis (on home 3L nasal cannula), DM, a fib (on eliquis), dementia (baseline mental status Aox1), chronic constipation, dysphagia, GERD, and malnutrition who presents with altered mental status and suspicion of GI bleed from nursing home. Found to have pneumothorax incidentally on imaging, hemodynamically stable and on home O2 of nasal cannula 4 L. Admitted to RCU for monitoring."    White blood cell count is elevated. Preliminary CT Chest revealed "Small left pneumothorax.  Diffuse bilataral patchy ground glass and consolidative opacities. Bilateral areas of reticulation and bronchiectasis consistent with history of pulmonary fibrosis. Cannot exclude underlying infection. Tracheal secretions. Distended gallbladder with no pericholecystic fat stranding or edema. Findings discussed with HARRIET Mccarthy. Follow up final report in AM." CTH -No acute transcortical infarct or intracranial hemorrhage.  -White matter small vessel disease."    Of note, patient is known to this service. Seen for bedside swallow assessment 6/24/23 with recommendations for minced and moist with mildly thick liquids. See consult for details.     chart contents noted, received clearance for PO trials from ACP.  Patient seen for bedside swallow assessment in RCU. Received upright in bed, receiving supplemental O2 via nasal cannula. Patient's daughter present at bedside, providing translation as needed. As per patient's daughter, patient received a FEEST at nursing home prior to admission with recommendations for "continue pureed with thickened liquids". Patient able to follow all 1-step directives. Patient reported "colon pain", ACP made aware.
Per Pulmonology PA Note 7/18/22: "87 yo M with PMH pulmonary fibrosis (on home 3L nasal cannula), DM, a fib (on eliquis), dementia (baseline mental status Aox1), chronic constipation, dysphagia, GERD, and malnutrition who presents with altered mental status and suspicion of GI bleed from nursing home. Found to have pneumothorax incidentally on imaging, hemodynamically stable and on home O2 of nasal cannula 4 L. Admitted to RCU for monitoring"     Patient is familiar to this department as the patient was seen for an initial swallow evaluation on 7/16/22 with recommendation of NPO with consideration of short-term non oral means of nutrition. Refer to consult for further details.     Patient received upright in bed awake and alert with family present at bedside. Supplemental oxygen in place via nasal canula. Per patient's family, patient received FEES at Nursing Home ~ one week ago with recommendation of puree with "thick" liquids, however reports patient is not tolerating diet anymore. Of note, patient requiring maximal verbal and tactile cues from SLP and family to sustain appropriate level of alertness to participate in PO trials as patient recently provided pain management.

## 2022-07-19 NOTE — PROGRESS NOTE ADULT - ASSESSMENT
85 yo M with PMH pulmonary fibrosis (on home 3L nasal cannula), DM, a fib (on eliquis), dementia (baseline mental status Aox1), chronic constipation, dysphagia, GERD, and malnutrition who presents with altered mental status and suspicion of GI bleed from nursing home. Found to have pneumothorax incidentally on imaging, hemodynamically stable and on home O2 of nasal cannula 4 L. Admitted to RCU for monitoring.      # NEURO  - baseline mental status Aox1 per daughter,   - c/w home sertraline and home xanax prn   - frequent reorientation  - CT head -No acute transcortical infarct or intracranial hemorrhage. White matter small vessel disease.    # CARDIOVASCULAR: recently dx with a fib   - hold Eliquis in setting of possible GI bleed   - c/w Cardizem 240mg PO daily  - Monitor HR/BP closely    # RESPIRATORY: found to have small L PTX incidentally on CT abd. Hemodynamically stable, no increase in O2 requirement.  - continue home nasal cannula 4L  - continue home inhalers: incruse ellipta, pulmicort, and Hypersal  - c/w Xopenex - HR much improved   - d'storm solumedrol 20 mg daily on 7/18  - CT chest as noted avove  - Rpt CXR - Stable small left pneumothorax and diffuse bilateral patchy and reticular opacities.    # INFECTIOUS DISEASE  - Leukocytosis downtrending 18>>13>>12>>10 with lethargy likely due to infection  - monitor fever curve, wbc count  - s/p 1 dose unasyn 7/15  - c/w zosyn 7/15 - vancomycin d'storm on 7/18 - MRSA PCR neg  - Staph areus + - started on Bactroban 1app BID for 5 days   - UA+/ urine cx >100k GNR, blood culture NGTD    # GI: hx of colitis, CT AP with significant stool burden, s/p disimpaction in ED 7/15. Also suspicion of GI bleed given dark stools noted in nursing home, although hemoglobin stable at baseline compared to CBC 2 weeks prior.  - continue enema   - Rpt Speech and Swallow done 7/18 - Pureed with Moderate thick liquid    # HEME  - sent from nursing home with black stool and suspicion of GI bleed  - hold Eliquis (for a fib) in setting of possible bleed  - monitor stool color  - serial CBC   +FOBT, GI emailed for further evaluation    # ENDOCRINE  - hx of DM   - hold repaglinide while inpatient    #   +Varma - monitor UOP closely  - Otherwise, no active issues     # GOC  - discussed with daughter at bedside, she will discuss with rest of her siblings and follow up     DVT ppx: hold in setting of possible GI bleed, SCDs   85 yo M with PMH pulmonary fibrosis (on home 3L nasal cannula), DM, a fib (on eliquis), dementia (baseline mental status Aox1), chronic constipation, dysphagia, GERD, and malnutrition who presents with altered mental status and suspicion of GI bleed from nursing home. Found to have pneumothorax incidentally on imaging, hemodynamically stable and on home O2 of nasal cannula 4 L. Admitted to RCU for monitoring.      # NEURO  - Baseline mental status Aox1 per daughter, However today, Alert and oriented x 0  - c/w home sertraline and Hold home xanax prn for now in settings of acute worsening mental status    - CT head -No acute transcortical infarct or intracranial hemorrhage. White matter small vessel disease.  - Ammonia wnl     # CARDIOVASCULAR: recently dx with a fib   - hold Eliquis in setting of possible GI bleed   - Will hold Home dose Cardizem 240mg PO daily for now since NPO  - Will start IV Lopressor 2.5mg IV q8hrs  - Monitor HR/BP closely    # RESPIRATORY  - found to have small L PTX incidentally on CT abd. Hemodynamically stable, no increase in O2 requirement.  - continue home inhalers: incruse ellipta, pulmicort, and Hypersal  - c/w Xopenex - HR much improved   - d'storm solumedrol 20 mg daily on 7/18  - CT chest as noted above   - Rpt CXR - Stable small left pneumothorax and diffuse bilateral patchy and reticular opacities.  - ABG pH 7.36, pC02 71, HCO3 40  - Will start patient on BIPAP 10/5, FIO2 35% - will adjust as needed in settings of increase work of breathing  - Will f/u with AM CXR , and ABG this evening    # INFECTIOUS DISEASE  - Leukocytosis up trending again 18>>13>>12>>10>>14>>19 with lethargy   - monitor fever curve, wbc count  - s/p 1 dose unasyn 7/15, and Vanco d'storm on 7/18 (MRSA PCR Neg)   - Staph areus + - started on Bactroban 1app BID for 5 days   - UA+/ urine cx >100k GNR, +pseudomonas, sens to zosyn  - c/w Zosyn (7/15 - ...)  - Worsening Leukocytosis today, s/p one dose of Vanco 1g on 7/19  - Lactate wnl     # GI: hx of colitis, CT AP with significant stool burden, s/p disimpaction in ED 7/15. Also suspicion of GI bleed given dark stools noted in nursing home, although hemoglobin stable at baseline compared to CBC 2 weeks prior.  - continue enema   - Rpt Speech and Swallow done 7/18 - Pureed with Moderate thick liquid - Will consider CINE when appropriate   - NPO for now due to worsening mental status  - c/w D5NS @65cc/hr    # HEME  - sent from nursing home with black stool and suspicion of GI bleed  - hold Eliquis (for a fib) in setting of possible bleed  - monitor stool color  - serial CBC   +FOBT, GI following, no intervention at this time. - Family does not wish any invasive procedures    # ENDOCRINE  - hx of DM   - hold repaglinide while inpatient  - ISS q 6 hr if needed    #   +Varma - monitor UOP closely  - Hyperkalemia noted in evening lab - K 5.9  s/p Regular insulin 10 and D50 25 gram   - Will repeat in morning lab    # GOC  - discussed with daughter at bedside and over the phone (Beti/Mena) - Pt is now DNR/DNI - MOLST in chart    # DVT ppx: hold in setting of possible GI bleed, SCDs

## 2022-07-19 NOTE — CONSULT NOTE ADULT - ASSESSMENT
All recommendations are tentative until note is attested by attending.     Irene Lord, PGY-4   Gastroenterology/Hepatology Fellow  Available on Microsoft Teams  [phone] ([facility] Short Range Pager)  [phone] ([facility] Long Range Pager)    After 5pm, please contact the on-call GI fellow. [phone]     All recommendations are tentative until note is attested by attending.     Irene Lord, PGY-4   Gastroenterology/Hepatology Fellow  Available on Microsoft Teams  [phone] ([facility] Short Range Pager)  [phone] ([facility] Long Range Pager)    After 5pm, please contact the on-call GI fellow. [phone] 85 yo male with pulmonary fibrosis on 3L home O2, Afib on eliquis, dementia with baseline AOx1, chronic constipation, malnutrition who presented with worsening altered mental status and abdominal pain and dark stools over the past 2-3 days from nursing home. Gastroenterology consulted for +FOBT after noted bright red blood in stool.     All recommendations are tentative until note is attested by attending.     Irene Lord, PGY-4   Gastroenterology/Hepatology Fellow  Available on Microsoft Teams  [phone] ([facility] Short Range Pager)  [phone] ([facility] Long Range Pager)    After 5pm, please contact the on-call GI fellow. [phone] 85 yo male with pulmonary fibrosis on 3L home O2, Afib on eliquis, dementia with baseline AOx1, chronic constipation, malnutrition who presented with worsening altered mental status and abdominal pain and dark stools over the past 2-3 days from nursing home. Gastroenterology consulted for +FOBT after noted bright red blood in stool.     #BRBPR   Likely related to hard stool from constipation. Cannot r/o colon cancer. Discussion with patient's daughter revealing that family does not want any more invasive procedures and would like to focus on comfort care at this time.     Recommendations:   -per discussion with daughter, family is pursing hospice at this time   -miralax TID, senna 17.2 QHS, BID mineral oil or tap water enemas to aid in constipation   - rest of care per primary team     All recommendations are tentative until note is attested by attending.     Gastroenterology will sign off at this time.     Irene Lord, PGY-4   Gastroenterology/Hepatology Fellow  Available on Microsoft Teams  [phone] ([facility] Short Range Pager)  [phone] ([facility] Long Range Pager)    After 5pm, please contact the on-call GI fellow. [phone] 87 yo male with pulmonary fibrosis on 3L home O2, Afib on eliquis, dementia with baseline AOx1, chronic constipation, malnutrition who presented with worsening altered mental status and abdominal pain and dark stools over the past 2-3 days from nursing home. Gastroenterology consulted for +FOBT after noted bright red blood in stool.     #BRBPR: Likely related to constipation - possibly underlying stercoral colitis vs hemorrhoids. Cannot r/o colon cancer.     Recommendations:   - Discussion with patient's daughter revealing that family does not want any more invasive procedures and would like to focus on comfort care at this time. Family is planning for home hospice  - miralax TID, senna 17.2 QHS, BID mineral oil or tap water enemas to aid in constipation   - rest of care per primary team     All recommendations are tentative until note is attested by attending.     Gastroenterology will sign off at this time.     Irene Lord, PGY-4   Gastroenterology/Hepatology Fellow  Available on Microsoft Teams  [phone] ([facility] Short Range Pager)  [phone] ([facility] Long Range Pager)    After 5pm, please contact the on-call GI fellow. [phone]

## 2022-07-19 NOTE — CONSULT NOTE ADULT - SUBJECTIVE AND OBJECTIVE BOX
HPI:  87 yo M with PMH pulmonary fibrosis (on home 3L nasal cannula), DM, a fib (on eliquis), dementia (baseline mental status Aox1), chronic constipation, dysphagia, GERD, and malnutrition who presents with altered mental status. Per his daughter at bedside, he has become more confused over the past 2-3 days with increased coughing and sputum as well as abdominal discomfort. No reported fevers. Due to mental status, unable to report if any chills or further ROS. He is not on antifibrotics for pulmonary fibrosis. He is on inhalers.  Of note, he was recently admitted at the end of 6/2022 with pyelonephritis vs stercoral colitis treated with course of antibiotics. He was discharged back to nursing home. (15 Jul 2022 17:00)    PERTINENT PM/SXH:   DM (diabetes mellitus)    H/O pulmonary fibrosis    Pulmonary fibrosis    Pulmonary fibrosis    HTN (hypertension)    Chronic atrial fibrillation    Dysphagia    GERD (gastroesophageal reflux disease)    Protein calorie malnutrition    MDD (major depressive disorder)    TB (tuberculosis)    Constipation      S/P hernia repair    H/O cataract extraction    History of prostate surgery      FAMILY HISTORY:  No pertinent family history in first degree relatives      Family Hx substance abuse [ ]yes [ ]no  ITEMS NOT CHECKED ARE NOT PRESENT    SOCIAL HISTORY:   Significant other/partner[x ]  Children[x ]  Jew/Spirituality:  Substance hx:  [ ]   Tobacco hx:  [ ]   Alcohol hx: [ ]   Home Opioid hx:  [ ] I-Stop Reference No:  Living Situation: [ ]Home  [ ]Long term care  [ x]Rehab [ ]Other    ADVANCE DIRECTIVES:    DNR/MOLST  [ ]  Living Will  [ ]   DECISION MAKER(s):  [ ] Health Care Proxy(s)  [ x] Surrogate(s)  [ ] Guardian           Name(s): Phone Number(s):  Adult children  Deferred decision making (main spokesperson) Beti MCDOWELL (daughter) #[phone]  Pt is , wife has dementia    BASELINE (I)ADL(s) (prior to admission):  Stark: [ ]Total  [ x] Moderate [ ]Dependent    Allergies    No Known Allergies    Intolerances    MEDICATIONS  (STANDING):  acetylcysteine 20%  Inhalation 4 milliLiter(s) Inhalation every 12 hours  buDESOnide    Inhalation Suspension 0.5 milliGRAM(s) Inhalation daily  dextrose 5% + lactated ringers. 1000 milliLiter(s) (50 mL/Hr) IV Continuous <Continuous>  dextrose 5%. 1000 milliLiter(s) (100 mL/Hr) IV Continuous <Continuous>  dextrose 5%. 1000 milliLiter(s) (50 mL/Hr) IV Continuous <Continuous>  dextrose 50% Injectable 25 Gram(s) IV Push once  dextrose 50% Injectable 12.5 Gram(s) IV Push once  dextrose 50% Injectable 25 Gram(s) IV Push once  diltiazem    milliGRAM(s) Oral daily  glucagon  Injectable 1 milliGRAM(s) IntraMuscular once  insulin lispro (ADMELOG) corrective regimen sliding scale   SubCutaneous three times a day before meals  levalbuterol Inhalation 0.63 milliGRAM(s) Inhalation every 6 hours  mupirocin 2% Ointment 1 Application(s) Topical two times a day  pantoprazole    Tablet 40 milliGRAM(s) Oral before breakfast  piperacillin/tazobactam IVPB.. 3.375 Gram(s) IV Intermittent every 8 hours  polyethylene glycol 3350 17 Gram(s) Oral daily  senna 8.6 milliGRAM(s) Oral Tablet - Peds 1 Tablet(s) Oral at bedtime  sertraline 25 milliGRAM(s) Oral daily  sodium chloride 3%  Inhalation 4 milliLiter(s) Inhalation every 6 hours    MEDICATIONS  (PRN):  acetaminophen     Tablet .. 650 milliGRAM(s) Oral every 6 hours PRN Temp greater or equal to 38C (100.4F), Moderate Pain (4 - 6)  ALPRAZolam 0.25 milliGRAM(s) Oral every 8 hours PRN agitation/anxiety  aluminum hydroxide/magnesium hydroxide/simethicone Suspension 30 milliLiter(s) Oral every 6 hours PRN Dyspepsia  bisacodyl 5 milliGRAM(s) Oral at bedtime PRN Constipation  bisacodyl Suppository 10 milliGRAM(s) Rectal daily PRN Constipation  dextrose Oral Gel 15 Gram(s) Oral once PRN Blood Glucose LESS THAN 70 milliGRAM(s)/deciliter  morphine  - Injectable 0.5 milliGRAM(s) IV Push every 4 hours PRN dyspnea/pain/increased work of breathing    PRESENT SYMPTOMS: [ x]Unable to self-report  [x ] CPOT [ ] PAINADs [x ] RDOS  Source if other than patient:  [ ]Family   [ ]Team     Pain: [ ]yes [ ]no  QOL impact -   Location -                    Aggravating factors -  Quality -  Radiation -  Timing-  Severity (0-10 scale):  Minimal acceptable level (0-10 scale):     CPOT:  2  https://www.[facility].org/getattachment/[email or web address]/Critical-Care-Pain-Observation-Tool-(CPOT)    PAIN AD Score:   http://geriatrictoolkit.[facility]/cog/painad.pdf (press ctrl +  left click to view)    Dyspnea:                           [ ]Mild [ ]Moderate [ ]Severe      RDOS: >7  0 to 2  minimal or no respiratory distress   3  mild distress  4 to 6 moderate distress  >7 severe distress  https://[facility]information.net/handouts/hen/Respiratory_Distress_Observation_Scale.pdf (Ctrl +  left click to view)     Anxiety:                             [ ]Mild [ ]Moderate [ ]Severe  Fatigue:                             [ ]Mild [ ]Moderate [ ]Severe  Nausea:                             [ ]Mild [ ]Moderate [ ]Severe  Loss of appetite:              [ ]Mild [ ]Moderate [ ]Severe  Constipation:                    [ ]Mild [ ]Moderate [ ]Severe    PCSSQ[Palliative Care Spiritual Screening Question]   Severity (0-10):  Score of 4 or > indicate consideration of Chaplaincy referral.    Chaplaincy Referral: [ ] yes [ ] refused [ ] following    Other Symptoms:  [ ]All other review of systems negative     Palliative Performance Status Version 2:  20 %    http://[facility].org/files/news/palliative_performance_scale_ppsv2.pdf    PHYSICAL EXAM:  Vital Signs Last 24 Hrs  T(C): 36.1 (19 Jul 2022 13:17), Max: 36.7 (19 Jul 2022 03:26)  T(F): 97 (19 Jul 2022 13:17), Max: 98.1 (19 Jul 2022 03:26)  HR: 116 (19 Jul 2022 13:17) (76 - 116)  BP: 143/82 (19 Jul 2022 13:17) (136/72 - 168/89)  BP(mean): --  RR: 18 (19 Jul 2022 13:17) (16 - 18)  SpO2: 98% (19 Jul 2022 13:17) (94% - 100%)    Parameters below as of 19 Jul 2022 13:17  Patient On (Oxygen Delivery Method): nasal cannula     I&O's Summary    18 Jul 2022 07:01  -  19 Jul 2022 07:00  --------------------------------------------------------  IN: 600 mL / OUT: 1050 mL / NET: -450 mL      GENERAL: [ ]Cachexia    [ ]Alert  [ ]Oriented x   [x ]Lethargic  [ ]Unarousable  [ ]Verbal  [ ]Non-Verbal  Behavioral:   [ ] Anxiety  [ ] Delirium [ ] Agitation [ ] Other  HEENT:  [ ]Normal   [ x]Dry mouth   [ ]ET Tube/Trach  [ ]Oral lesions  PULMONARY:   [ ]Clear [ x]Tachypnea  [ x]Audible excessive secretions   [ ]Rhonchi        [ ]Right [ ]Left [ ]Bilateral  [ ]Crackles        [ ]Right [ ]Left [ ]Bilateral  [ ]Wheezing     [ ]Right [ ]Left [ ]Bilateral  [ ]Diminished breath sounds [ ]right [ ]left [ ]bilateral  CARDIOVASCULAR:    [ ]Regular [ ]Irregular [x ]Tachy  [ ]Kemal [ ]Murmur [ ]Other  GASTROINTESTINAL:  [x ]Soft  [ ]Distended   [ x]+BS  [x]Non tender [ ]Tender  [ ]Other [ ]PEG [ ]OGT/ NGT  Last BM: 7/19  GENITOURINARY:  [ ]Normal [ ] Incontinent   [ ]Oliguria/Anuria   [ x]Varma  MUSCULOSKELETAL:   [ ]Normal   [ ]Weakness  [ x]Bed/Wheelchair bound [ ]Edema  NEUROLOGIC:   [ ]No focal deficits  [x ]Cognitive impairment  [ ]Dysphagia [ ]Dysarthria [ ]Paresis [ ]Other   SKIN:   [ x]Normal  [ ]Rash  [ ]Other  [ ]Pressure ulcer(s)       Present on admission [ ]y [ ]n    CRITICAL CARE:  [ ] Shock Present  [ ]Septic [ ]Cardiogenic [ ]Neurologic [ ]Hypovolemic  [ ]  Vasopressors [ ]  Inotropes   [ ]Respiratory failure present [ ]Mechanical ventilation [ ]Non-invasive ventilatory support [ ]High flow    [ ]Acute  [ ]Chronic [ ]Hypoxic  [ ]Hypercarbic [ ]Other  [ ]Other organ failure     LABS:                        10.9   14.97 )-----------( 182      ( 19 Jul 2022 05:30 )             34.5   07-19    140  |  100  |  14  ----------------------------<  155<H>  5.3   |  35<H>  |  0.40<L>    Ca    9.3      19 Jul 2022 05:30  Phos  2.1     07-19  Mg     1.90     07-19    RADIOLOGY & ADDITIONAL STUDIES:  < from: CT Chest No Cont (07.15.22 @ 19:32) >  IMPRESSION:  Upper lung predominant bilateral areas of reticulations, architectural   distortion and traction bronchiectasis likely representing interstitial   lung disease. There are superimposed patchy and consolidative opacities   within all lobes which are possibly infectious/inflammatory in etiology.   Recommend follow-up chest CT in 4 weeks after treatment.  The right upper lobe bronchus is opacified secondary to retained   secretions.  Trace left pneumothorax.    < from: CT Head No Cont (07.15.22 @ 19:32) >  IMPRESSION:  -No acute transcortical infarct or intracranial hemorrhage.  -White matter small vessel disease.    PROTEIN CALORIE MALNUTRITION PRESENT: [ ]mild [ ]moderate [ ]severe [ ]underweight [ ]morbid obesity  https://www.andeal.org/vault/2440/web/files/ONC/Table_Clinical%20Characteristics%20to%20Document%20Malnutrition-White%20JV%20et%20al%202012.pdf    Height (cm): 154.9 (07-15-22 @ 09:46), 154.9 (06-21-22 @ 22:42)  Weight (kg): 40.4 (07-15-22 @ 19:52), 51 (06-21-22 @ 22:42)  BMI (kg/m2): 16.8 (07-15-22 @ 19:52), 21.3 (07-15-22 @ 09:46), 21.3 (06-21-22 @ 22:42)    [x ]PPSV2 < or = to 30% [ ]significant weight loss  [ ]poor nutritional intake  [ ]anasarca[ ]Artificial Nutrition      Other REFERRALS:  [x ]Hospice  [ ]Child Life  [ ]Social Work  [ ]Case management [ ]Holistic Therapy     Goals of Care Document:

## 2022-07-19 NOTE — CHART NOTE - NSCHARTNOTEFT_GEN_A_CORE
ACP Provider Note: Lab Result    Provider ws notify of critical ABG values,  Patient has been on BIPAP during the day for increased work of breathing, seen at bedside lethargic, lethargic. Patient  brought in to the ED for increased confusion over the past 2-3 days with increased coughing and sputum. PMHx of pulmonary fibrosis (on home 3L nasal cannula), DM, a fib (on eliquis), dementia (baseline mental status Aox1), chronic constipation, dysphagia, GERD, and malnutrition.      T(C): 35.7 (07-19-22 @ 22:06), Max: 36.7 (07-19-22 @ 03:26)  HR: 88 (07-19-22 @ 22:35) (85 - 116)  BP: 133/61 (07-19-22 @ 22:06) (133/61 - 143/82)  RR: 23 (07-19-22 @ 22:06) (16 - 23)  SpO2: 100% (07-19-22 @ 22:35) (94% - 100%)  Wt(kg): --Vital Signs Last 24 Hrs  T(C): 35.7 (19 Jul 2022 22:06), Max: 36.7 (19 Jul 2022 03:26)  T(F): 96.3 (19 Jul 2022 22:06), Max: 98.1 (19 Jul 2022 03:26)  HR: 88 (19 Jul 2022 22:35) (85 - 116)  BP: 133/61 (19 Jul 2022 22:06) (133/61 - 143/82)  BP(mean): --  RR: 23 (19 Jul 2022 22:06) (16 - 23)  SpO2: 100% (19 Jul 2022 22:35) (94% - 100%)    Parameters below as of 19 Jul 2022 22:32  Patient On (Oxygen Delivery Method): BiPAP/CPAP        PHYSICAL EXAM:  GENERAL: lethargic  CHEST/LUNG: Clear to percussion bilaterally; No rales, rhonchi, wheezing, or rubs  HEART: Regular rate and rhythm; No murmurs, rubs, or gallops      LABS:                        10.7   19.03 )-----------( 211      ( 19 Jul 2022 15:56 )             35.5     07-19    142  |  99  |  18  ----------------------------<  219<H>  5.9<H>   |  35<H>  |  0.52    Ca    9.4      19 Jul 2022 15:56  Phos  3.6     07-19  Mg     1.90     07-19    TPro  5.7<L>  /  Alb  2.7<L>  /  TBili  0.7  /  DBili  x   /  AST  10  /  ALT  15  /  AlkPhos  102  07-19        ABG - ( 19 Jul 2022 21:30 )  pH, Arterial: 7.21  pH, Blood: x     /  pCO2: 106   /  pO2: 125   / HCO3: 42    / Base Excess: 10.1  /  SaO2: 99.0        Plan::  - BIPAP setting changed to Fio2 45%, IAP/EAP 12/6  - Repeat ABG in 1hr   - Morphine 0.5 - 1mg IV q4hrs PRN for breakthrough tachypnea/discomfort   - Glycopyrrolate 0.4mg IV q6hrs PRN for increased secretions    Patient is DNR/DNI.    Plan discussed with RN

## 2022-07-19 NOTE — CONSULT NOTE ADULT - PROBLEM SELECTOR RECOMMENDATION 4
Advanced  Discussed with pts daughters that intubation and MV would likely lead to long term dependence given pts underlying pulmonary fibrosis.  They both shared they would not want him to be intubated, they want to discuss with the rest of their siblings  In agreement with starting medications for tachypnea and increased secretions

## 2022-07-19 NOTE — PROVIDER CONTACT NOTE (CRITICAL VALUE NOTIFICATION) - ASSESSMENT
Pt pCO2 106. Temp 96.3F, , /61, RR 23, SpO2 100% on BIPAP at 50%. Pt pCO2 106. Temp 96.3F, , /61, RR 23, SpO2 100% on BIPAP at 35%.

## 2022-07-19 NOTE — CONSULT NOTE ADULT - SUBJECTIVE AND OBJECTIVE BOX
HPI:    Allergies:  No Known Allergies      Home Medications:    Hospital Medications:  acetaminophen     Tablet .. 650 milliGRAM(s) Oral every 6 hours PRN  acetylcysteine 20%  Inhalation 4 milliLiter(s) Inhalation every 12 hours  ALPRAZolam 0.25 milliGRAM(s) Oral every 8 hours PRN  aluminum hydroxide/magnesium hydroxide/simethicone Suspension 30 milliLiter(s) Oral every 6 hours PRN  bisacodyl 5 milliGRAM(s) Oral at bedtime PRN  bisacodyl Suppository 10 milliGRAM(s) Rectal daily PRN  buDESOnide    Inhalation Suspension 0.5 milliGRAM(s) Inhalation daily  dextrose 5%. 1000 milliLiter(s) IV Continuous <Continuous>  dextrose 5%. 1000 milliLiter(s) IV Continuous <Continuous>  dextrose 50% Injectable 25 Gram(s) IV Push once  dextrose 50% Injectable 12.5 Gram(s) IV Push once  dextrose 50% Injectable 25 Gram(s) IV Push once  dextrose Oral Gel 15 Gram(s) Oral once PRN  diltiazem    milliGRAM(s) Oral daily  glucagon  Injectable 1 milliGRAM(s) IntraMuscular once  insulin lispro (ADMELOG) corrective regimen sliding scale   SubCutaneous three times a day before meals  lactated ringers. 1000 milliLiter(s) IV Continuous <Continuous>  levalbuterol Inhalation 0.63 milliGRAM(s) Inhalation every 6 hours  mupirocin 2% Ointment 1 Application(s) Topical two times a day  pantoprazole    Tablet 40 milliGRAM(s) Oral before breakfast  piperacillin/tazobactam IVPB.. 3.375 Gram(s) IV Intermittent every 8 hours  polyethylene glycol 3350 17 Gram(s) Oral daily  senna 8.6 milliGRAM(s) Oral Tablet - Peds 1 Tablet(s) Oral at bedtime  sertraline 25 milliGRAM(s) Oral daily  sodium chloride 3%  Inhalation 4 milliLiter(s) Inhalation every 6 hours      PMHX/PSHX:  DM (diabetes mellitus)    H/O pulmonary fibrosis    Pulmonary fibrosis    Pulmonary fibrosis    HTN (hypertension)    Chronic atrial fibrillation    Dysphagia    GERD (gastroesophageal reflux disease)    Protein calorie malnutrition    MDD (major depressive disorder)    TB (tuberculosis)    Constipation    S/P hernia repair    H/O cataract extraction    History of prostate surgery        Family history:  No pertinent family history in first degree relatives    No pertinent family history in first degree relatives        Denies family history of colon cancer/polyps, stomach cancer/polyps, pancreatic cancer/masses, liver cancer/disease, ovarian cancer and endometrial cancer.    Social History:   Tob: Denies  EtOH: Denies  Illicit Drugs: Denies    ROS:     General:  No wt loss, fevers, chills, night sweats, fatigue  Eyes:  Good vision, no reported pain  ENT:  No sore throat, pain, runny nose, dysphagia  CV:  No pain, palpitations, hypo/hypertension  Pulm:  No dyspnea, cough, tachypnea, wheezing  GI:  see HPI  :  No pain, bleeding, incontinence, nocturia  Muscle:  No pain, weakness  Neuro:  No weakness, tingling, memory problems  Psych:  No fatigue, insomnia, mood problems, depression  Endocrine:  No polyuria, polydipsia, cold/heat intolerance  Heme:  No petechiae, ecchymosis, easy bruisability  Skin:  No rash, tattoos, scars, edema    PHYSICAL EXAM:     GENERAL:  No acute distress  HEENT:  NCAT, no scleral icterus   CHEST:  no respiratory distress  HEART:  Regular rate and rhythm  ABDOMEN:  Soft, non-tender, non-distended, normoactive bowel sounds,  no masses  EXTREMITIES: No edema  SKIN:  No rash/erythema/ecchymoses/petechiae/wounds/abscess/warm/dry  NEURO:  Alert and oriented x 3, no asterixis    Vital Signs:  Vital Signs Last 24 Hrs  T(C): 36.2 (19 Jul 2022 04:37), Max: 36.7 (19 Jul 2022 03:26)  T(F): 97.2 (19 Jul 2022 04:37), Max: 98.1 (19 Jul 2022 03:26)  HR: 96 (19 Jul 2022 04:42) (76 - 107)  BP: 136/72 (19 Jul 2022 04:37) (136/72 - 168/89)  BP(mean): --  RR: 16 (19 Jul 2022 04:37) (16 - 18)  SpO2: 100% (19 Jul 2022 04:42) (94% - 100%)    Parameters below as of 19 Jul 2022 04:42  Patient On (Oxygen Delivery Method): nasal cannula      Daily     Daily     LABS:                        10.9   14.97 )-----------( 182      ( 19 Jul 2022 05:30 )             34.5     Mean Cell Volume: 97.5 fL (07-19-22 @ 05:30)    07-19    140  |  100  |  14  ----------------------------<  155<H>  5.3   |  35<H>  |  0.40<L>    Ca    9.3      19 Jul 2022 05:30  Phos  2.1     07-19  Mg     1.90     07-19                                    10.9   14.97 )-----------( 182      ( 19 Jul 2022 05:30 )             34.5                         10.3   10.24 )-----------( 147      ( 18 Jul 2022 05:25 )             30.6                         10.7   12.70 )-----------( 145      ( 17 Jul 2022 05:10 )             33.1       Imaging:             HPI: 85 yo male with pulmonary fibrosis on 3L home O2, Afib on eliquis, dementia with baseline AOx1, chronic constipation, malnutrition who presented with worsening altered mental status and abdominal pain over the past 2-3 days from nursing home. Gastroenterology consulted for +FOBT after noted bright red blood in stool.     Patient is altered and unable to participate in history. PCA at bedside reports that patient's mental status has been altered.     Patient has chronic history of constipation and had been getting suppositories to aid in bowel movements. Bright red blood was noted on stool ON on 7/18 and +FOBT. Patient has a history of Afib on apixaban, which had been held on admission.     VS stable, patient on 4L NC. Labs of note include hgb 10.9 (baseline 10-11), creatinine 0.40. CT A/P with nonobstructive bowel pattern.       Allergies:  No Known Allergies      Home Medications:    Hospital Medications:  acetaminophen     Tablet .. 650 milliGRAM(s) Oral every 6 hours PRN  acetylcysteine 20%  Inhalation 4 milliLiter(s) Inhalation every 12 hours  ALPRAZolam 0.25 milliGRAM(s) Oral every 8 hours PRN  aluminum hydroxide/magnesium hydroxide/simethicone Suspension 30 milliLiter(s) Oral every 6 hours PRN  bisacodyl 5 milliGRAM(s) Oral at bedtime PRN  bisacodyl Suppository 10 milliGRAM(s) Rectal daily PRN  buDESOnide    Inhalation Suspension 0.5 milliGRAM(s) Inhalation daily  dextrose 5%. 1000 milliLiter(s) IV Continuous <Continuous>  dextrose 5%. 1000 milliLiter(s) IV Continuous <Continuous>  dextrose 50% Injectable 25 Gram(s) IV Push once  dextrose 50% Injectable 12.5 Gram(s) IV Push once  dextrose 50% Injectable 25 Gram(s) IV Push once  dextrose Oral Gel 15 Gram(s) Oral once PRN  diltiazem    milliGRAM(s) Oral daily  glucagon  Injectable 1 milliGRAM(s) IntraMuscular once  insulin lispro (ADMELOG) corrective regimen sliding scale   SubCutaneous three times a day before meals  lactated ringers. 1000 milliLiter(s) IV Continuous <Continuous>  levalbuterol Inhalation 0.63 milliGRAM(s) Inhalation every 6 hours  mupirocin 2% Ointment 1 Application(s) Topical two times a day  pantoprazole    Tablet 40 milliGRAM(s) Oral before breakfast  piperacillin/tazobactam IVPB.. 3.375 Gram(s) IV Intermittent every 8 hours  polyethylene glycol 3350 17 Gram(s) Oral daily  senna 8.6 milliGRAM(s) Oral Tablet - Peds 1 Tablet(s) Oral at bedtime  sertraline 25 milliGRAM(s) Oral daily  sodium chloride 3%  Inhalation 4 milliLiter(s) Inhalation every 6 hours      PMHX/PSHX:  DM (diabetes mellitus)    H/O pulmonary fibrosis    Pulmonary fibrosis    Pulmonary fibrosis    HTN (hypertension)    Chronic atrial fibrillation    Dysphagia    GERD (gastroesophageal reflux disease)    Protein calorie malnutrition    MDD (major depressive disorder)    TB (tuberculosis)    Constipation    S/P hernia repair    H/O cataract extraction    History of prostate surgery        Family history:  No pertinent family history in first degree relatives    No pertinent family history in first degree relatives        Denies family history of colon cancer/polyps, stomach cancer/polyps, pancreatic cancer/masses, liver cancer/disease, ovarian cancer and endometrial cancer.    Social History:   Tob: Denies  EtOH: Denies  Illicit Drugs: Denies    ROS:     General:  No wt loss, fevers, chills, night sweats, fatigue  Eyes:  Good vision, no reported pain  ENT:  No sore throat, pain, runny nose, dysphagia  CV:  No pain, palpitations, hypo/hypertension  Pulm:  No dyspnea, cough, tachypnea, wheezing  GI:  see HPI  :  No pain, bleeding, incontinence, nocturia  Muscle:  No pain, weakness  Neuro:  No weakness, tingling, memory problems  Psych:  No fatigue, insomnia, mood problems, depression  Endocrine:  No polyuria, polydipsia, cold/heat intolerance  Heme:  No petechiae, ecchymosis, easy bruisability  Skin:  No rash, tattoos, scars, edema    PHYSICAL EXAM:     GENERAL:  No acute distress, patient is sleeping soundly, not responsive to sound, cachetic   HEENT:  NCAT, no scleral icterus  CHEST:  no respiratory distress  HEART:  Regular rate and rhythm  ABDOMEN:  Soft, non-tender, non-distended, normoactive bowel sounds,  no masses  EXTREMITIES: No edema  SKIN:  No rash/erythema/ecchymoses/petechiae/wounds/abscess/warm/dry  NEURO:  Alert and oriented x 0, no asterixis    Vital Signs:  Vital Signs Last 24 Hrs  T(C): 36.2 (19 Jul 2022 04:37), Max: 36.7 (19 Jul 2022 03:26)  T(F): 97.2 (19 Jul 2022 04:37), Max: 98.1 (19 Jul 2022 03:26)  HR: 96 (19 Jul 2022 04:42) (76 - 107)  BP: 136/72 (19 Jul 2022 04:37) (136/72 - 168/89)  BP(mean): --  RR: 16 (19 Jul 2022 04:37) (16 - 18)  SpO2: 100% (19 Jul 2022 04:42) (94% - 100%)    Parameters below as of 19 Jul 2022 04:42  Patient On (Oxygen Delivery Method): nasal cannula      Daily     Daily     LABS:                        10.9   14.97 )-----------( 182      ( 19 Jul 2022 05:30 )             34.5     Mean Cell Volume: 97.5 fL (07-19-22 @ 05:30)    07-19    140  |  100  |  14  ----------------------------<  155<H>  5.3   |  35<H>  |  0.40<L>    Ca    9.3      19 Jul 2022 05:30  Phos  2.1     07-19  Mg     1.90     07-19                                    10.9   14.97 )-----------( 182      ( 19 Jul 2022 05:30 )             34.5                         10.3   10.24 )-----------( 147      ( 18 Jul 2022 05:25 )             30.6                         10.7   12.70 )-----------( 145      ( 17 Jul 2022 05:10 )             33.1       Imaging:             HPI: 85 yo male with pulmonary fibrosis on 3L home O2, Afib on eliquis, dementia with baseline AOx1, chronic constipation, malnutrition who presented with worsening altered mental status and abdominal pain and dark stools over the past 2-3 days from nursing home. Gastroenterology consulted for +FOBT after noted bright red blood in stool.     Patient is altered and unable to participate in history. PCA at bedside reports that patient's mental status has been altered.     Patient has chronic history of constipation and had been getting suppositories to aid in bowel movements. Bright red blood was noted on stool ON on 7/18 and +FOBT. Patient has a history of Afib on apixaban, which had been held on admission.     VS stable, patient on 4L NC. Labs of note include hgb 10.9 (baseline 10-11), creatinine 0.40. CT A/P with nonobstructive bowel pattern.       Allergies:  No Known Allergies      Home Medications:    Hospital Medications:  acetaminophen     Tablet .. 650 milliGRAM(s) Oral every 6 hours PRN  acetylcysteine 20%  Inhalation 4 milliLiter(s) Inhalation every 12 hours  ALPRAZolam 0.25 milliGRAM(s) Oral every 8 hours PRN  aluminum hydroxide/magnesium hydroxide/simethicone Suspension 30 milliLiter(s) Oral every 6 hours PRN  bisacodyl 5 milliGRAM(s) Oral at bedtime PRN  bisacodyl Suppository 10 milliGRAM(s) Rectal daily PRN  buDESOnide    Inhalation Suspension 0.5 milliGRAM(s) Inhalation daily  dextrose 5%. 1000 milliLiter(s) IV Continuous <Continuous>  dextrose 5%. 1000 milliLiter(s) IV Continuous <Continuous>  dextrose 50% Injectable 25 Gram(s) IV Push once  dextrose 50% Injectable 12.5 Gram(s) IV Push once  dextrose 50% Injectable 25 Gram(s) IV Push once  dextrose Oral Gel 15 Gram(s) Oral once PRN  diltiazem    milliGRAM(s) Oral daily  glucagon  Injectable 1 milliGRAM(s) IntraMuscular once  insulin lispro (ADMELOG) corrective regimen sliding scale   SubCutaneous three times a day before meals  lactated ringers. 1000 milliLiter(s) IV Continuous <Continuous>  levalbuterol Inhalation 0.63 milliGRAM(s) Inhalation every 6 hours  mupirocin 2% Ointment 1 Application(s) Topical two times a day  pantoprazole    Tablet 40 milliGRAM(s) Oral before breakfast  piperacillin/tazobactam IVPB.. 3.375 Gram(s) IV Intermittent every 8 hours  polyethylene glycol 3350 17 Gram(s) Oral daily  senna 8.6 milliGRAM(s) Oral Tablet - Peds 1 Tablet(s) Oral at bedtime  sertraline 25 milliGRAM(s) Oral daily  sodium chloride 3%  Inhalation 4 milliLiter(s) Inhalation every 6 hours      PMHX/PSHX:  DM (diabetes mellitus)    H/O pulmonary fibrosis    Pulmonary fibrosis    Pulmonary fibrosis    HTN (hypertension)    Chronic atrial fibrillation    Dysphagia    GERD (gastroesophageal reflux disease)    Protein calorie malnutrition    MDD (major depressive disorder)    TB (tuberculosis)    Constipation    S/P hernia repair    H/O cataract extraction    History of prostate surgery        Family history:  No pertinent family history in first degree relatives    No pertinent family history in first degree relatives        Denies family history of colon cancer/polyps, stomach cancer/polyps, pancreatic cancer/masses, liver cancer/disease, ovarian cancer and endometrial cancer.    Social History:   Tob: Denies  EtOH: Denies  Illicit Drugs: Denies    ROS:     General:  No wt loss, fevers, chills, night sweats, fatigue  Eyes:  Good vision, no reported pain  ENT:  No sore throat, pain, runny nose, dysphagia  CV:  No pain, palpitations, hypo/hypertension  Pulm:  No dyspnea, cough, tachypnea, wheezing  GI:  see HPI  :  No pain, bleeding, incontinence, nocturia  Muscle:  No pain, weakness  Neuro:  No weakness, tingling, memory problems  Psych:  No fatigue, insomnia, mood problems, depression  Endocrine:  No polyuria, polydipsia, cold/heat intolerance  Heme:  No petechiae, ecchymosis, easy bruisability  Skin:  No rash, tattoos, scars, edema    PHYSICAL EXAM:     GENERAL:  No acute distress, patient is sleeping soundly, not responsive to sound, cachetic   HEENT:  NCAT, no scleral icterus  CHEST:  no respiratory distress  HEART:  Regular rate and rhythm  ABDOMEN:  Soft, non-tender, non-distended, normoactive bowel sounds,  no masses  EXTREMITIES: No edema  SKIN:  No rash/erythema/ecchymoses/petechiae/wounds/abscess/warm/dry  NEURO:  Alert and oriented x 0, no asterixis    Vital Signs:  Vital Signs Last 24 Hrs  T(C): 36.2 (19 Jul 2022 04:37), Max: 36.7 (19 Jul 2022 03:26)  T(F): 97.2 (19 Jul 2022 04:37), Max: 98.1 (19 Jul 2022 03:26)  HR: 96 (19 Jul 2022 04:42) (76 - 107)  BP: 136/72 (19 Jul 2022 04:37) (136/72 - 168/89)  BP(mean): --  RR: 16 (19 Jul 2022 04:37) (16 - 18)  SpO2: 100% (19 Jul 2022 04:42) (94% - 100%)    Parameters below as of 19 Jul 2022 04:42  Patient On (Oxygen Delivery Method): nasal cannula      Daily     Daily     LABS:                        10.9   14.97 )-----------( 182      ( 19 Jul 2022 05:30 )             34.5     Mean Cell Volume: 97.5 fL (07-19-22 @ 05:30)    07-19    140  |  100  |  14  ----------------------------<  155<H>  5.3   |  35<H>  |  0.40<L>    Ca    9.3      19 Jul 2022 05:30  Phos  2.1     07-19  Mg     1.90     07-19                                    10.9   14.97 )-----------( 182      ( 19 Jul 2022 05:30 )             34.5                         10.3   10.24 )-----------( 147      ( 18 Jul 2022 05:25 )             30.6                         10.7   12.70 )-----------( 145      ( 17 Jul 2022 05:10 )             33.1       Imaging:             HPI: 85 yo male with pulmonary fibrosis on 3L home O2, Afib on eliquis, dementia with baseline AOx1, chronic constipation, malnutrition who presented with worsening altered mental status and abdominal pain and dark stools over the past 2-3 days from nursing home. Gastroenterology consulted for +FOBT after noted bright red blood in stool.     Patient is altered and unable to participate in history. PCA at bedside reports that patient's mental status has been altered overnight and this AM. Patient has chronic history of constipation and had been getting suppositories to aid in bowel movements. Bright red blood was noted on stool ON on 7/18 and +FOBT. Patient has a history of Afib on apixaban, which had been held on admission. Patient has been up to date with screening/surveillance colonoscopies until age 77.     Of note, patient had been seen previously during the last admission with sterocoral colitis and was disimpacted at that hospitalization, also with mild blood around stool.     Discussed with patient's daughter Beti, who stated that their goals for patient is to pursue hospice at home. They would not want any intervention or procedures and would like to focus on comfort only.     VS stable, patient on 4L NC. Labs of note include hgb 10.9 (baseline 10-11), creatinine 0.40. CT A/P with nonobstructive bowel pattern but rectum severely distended with stool.       Allergies:  No Known Allergies      Home Medications:    Hospital Medications:  acetaminophen     Tablet .. 650 milliGRAM(s) Oral every 6 hours PRN  acetylcysteine 20%  Inhalation 4 milliLiter(s) Inhalation every 12 hours  ALPRAZolam 0.25 milliGRAM(s) Oral every 8 hours PRN  aluminum hydroxide/magnesium hydroxide/simethicone Suspension 30 milliLiter(s) Oral every 6 hours PRN  bisacodyl 5 milliGRAM(s) Oral at bedtime PRN  bisacodyl Suppository 10 milliGRAM(s) Rectal daily PRN  buDESOnide    Inhalation Suspension 0.5 milliGRAM(s) Inhalation daily  dextrose 5%. 1000 milliLiter(s) IV Continuous <Continuous>  dextrose 5%. 1000 milliLiter(s) IV Continuous <Continuous>  dextrose 50% Injectable 25 Gram(s) IV Push once  dextrose 50% Injectable 12.5 Gram(s) IV Push once  dextrose 50% Injectable 25 Gram(s) IV Push once  dextrose Oral Gel 15 Gram(s) Oral once PRN  diltiazem    milliGRAM(s) Oral daily  glucagon  Injectable 1 milliGRAM(s) IntraMuscular once  insulin lispro (ADMELOG) corrective regimen sliding scale   SubCutaneous three times a day before meals  lactated ringers. 1000 milliLiter(s) IV Continuous <Continuous>  levalbuterol Inhalation 0.63 milliGRAM(s) Inhalation every 6 hours  mupirocin 2% Ointment 1 Application(s) Topical two times a day  pantoprazole    Tablet 40 milliGRAM(s) Oral before breakfast  piperacillin/tazobactam IVPB.. 3.375 Gram(s) IV Intermittent every 8 hours  polyethylene glycol 3350 17 Gram(s) Oral daily  senna 8.6 milliGRAM(s) Oral Tablet - Peds 1 Tablet(s) Oral at bedtime  sertraline 25 milliGRAM(s) Oral daily  sodium chloride 3%  Inhalation 4 milliLiter(s) Inhalation every 6 hours      PMHX/PSHX:  DM (diabetes mellitus)    H/O pulmonary fibrosis    Pulmonary fibrosis    Pulmonary fibrosis    HTN (hypertension)    Chronic atrial fibrillation    Dysphagia    GERD (gastroesophageal reflux disease)    Protein calorie malnutrition    MDD (major depressive disorder)    TB (tuberculosis)    Constipation    S/P hernia repair    H/O cataract extraction    History of prostate surgery        Family history:  No pertinent family history in first degree relatives    No pertinent family history in first degree relatives        Denies family history of colon cancer/polyps, stomach cancer/polyps, pancreatic cancer/masses, liver cancer/disease, ovarian cancer and endometrial cancer.    Social History:   Tob: Denies  EtOH: Denies  Illicit Drugs: Denies    ROS:     General:  No wt loss, fevers, chills, night sweats, fatigue  Eyes:  Good vision, no reported pain  ENT:  No sore throat, pain, runny nose, dysphagia  CV:  No pain, palpitations, hypo/hypertension  Pulm:  No dyspnea, cough, tachypnea, wheezing  GI:  see HPI  :  No pain, bleeding, incontinence, nocturia  Muscle:  No pain, weakness  Neuro:  No weakness, tingling, memory problems  Psych:  No fatigue, insomnia, mood problems, depression  Endocrine:  No polyuria, polydipsia, cold/heat intolerance  Heme:  No petechiae, ecchymosis, easy bruisability  Skin:  No rash, tattoos, scars, edema    PHYSICAL EXAM:     GENERAL:  No acute distress, patient is sleeping soundly, not responsive to sound, cachetic   HEENT:  NCAT, no scleral icterus  CHEST:  no respiratory distress  HEART:  Regular rate and rhythm  ABDOMEN:  Soft, non-tender, non-distended, normoactive bowel sounds,  no masses  EXTREMITIES: No edema  SKIN:  No rash/erythema/ecchymoses/petechiae/wounds/abscess/warm/dry  NEURO:  Alert and oriented x 0, no asterixis    Vital Signs:  Vital Signs Last 24 Hrs  T(C): 36.2 (19 Jul 2022 04:37), Max: 36.7 (19 Jul 2022 03:26)  T(F): 97.2 (19 Jul 2022 04:37), Max: 98.1 (19 Jul 2022 03:26)  HR: 96 (19 Jul 2022 04:42) (76 - 107)  BP: 136/72 (19 Jul 2022 04:37) (136/72 - 168/89)  BP(mean): --  RR: 16 (19 Jul 2022 04:37) (16 - 18)  SpO2: 100% (19 Jul 2022 04:42) (94% - 100%)    Parameters below as of 19 Jul 2022 04:42  Patient On (Oxygen Delivery Method): nasal cannula      Daily     Daily     LABS:                        10.9   14.97 )-----------( 182      ( 19 Jul 2022 05:30 )             34.5     Mean Cell Volume: 97.5 fL (07-19-22 @ 05:30)    07-19    140  |  100  |  14  ----------------------------<  155<H>  5.3   |  35<H>  |  0.40<L>    Ca    9.3      19 Jul 2022 05:30  Phos  2.1     07-19  Mg     1.90     07-19                                    10.9   14.97 )-----------( 182      ( 19 Jul 2022 05:30 )             34.5                         10.3   10.24 )-----------( 147      ( 18 Jul 2022 05:25 )             30.6                         10.7   12.70 )-----------( 145      ( 17 Jul 2022 05:10 )             33.1       Imaging:             HPI: 85 yo male with pulmonary fibrosis on 3L home O2, Afib on eliquis, dementia with baseline AOx1, chronic constipation, malnutrition who presented with worsening altered mental status and abdominal pain and dark stools over the past 2-3 days from nursing home. Gastroenterology consulted for +FOBT after noted bright red blood in stool.     Patient is altered and unable to participate in history. PCA at bedside reports that patient's mental status has been altered overnight and this AM. Patient has chronic history of constipation and had been getting suppositories to aid in bowel movements. Bright red blood was noted on stool ON on 7/18 and +FOBT. Patient has a history of Afib on apixaban, which had been held on admission. Patient has been up to date with screening/surveillance colonoscopies until age 77.     Of note, patient had been seen previously during the last admission with sterocoral colitis and was disimpacted at that hospitalization, also with mild blood around stool.     Discussed with patient's daughter Beti, who stated that their goals for patient is to pursue hospice at home. They would not want any intervention or procedures and would like to focus on comfort only.     VS stable, patient on 4L NC. Labs of note include hgb 10.9 (baseline 10-11), creatinine 0.40. CT A/P with nonobstructive bowel pattern but rectum severely distended with stool.       Allergies:  No Known Allergies      Home Medications:    Hospital Medications:  acetaminophen     Tablet .. 650 milliGRAM(s) Oral every 6 hours PRN  acetylcysteine 20%  Inhalation 4 milliLiter(s) Inhalation every 12 hours  ALPRAZolam 0.25 milliGRAM(s) Oral every 8 hours PRN  aluminum hydroxide/magnesium hydroxide/simethicone Suspension 30 milliLiter(s) Oral every 6 hours PRN  bisacodyl 5 milliGRAM(s) Oral at bedtime PRN  bisacodyl Suppository 10 milliGRAM(s) Rectal daily PRN  buDESOnide    Inhalation Suspension 0.5 milliGRAM(s) Inhalation daily  dextrose 5%. 1000 milliLiter(s) IV Continuous <Continuous>  dextrose 5%. 1000 milliLiter(s) IV Continuous <Continuous>  dextrose 50% Injectable 25 Gram(s) IV Push once  dextrose 50% Injectable 12.5 Gram(s) IV Push once  dextrose 50% Injectable 25 Gram(s) IV Push once  dextrose Oral Gel 15 Gram(s) Oral once PRN  diltiazem    milliGRAM(s) Oral daily  glucagon  Injectable 1 milliGRAM(s) IntraMuscular once  insulin lispro (ADMELOG) corrective regimen sliding scale   SubCutaneous three times a day before meals  lactated ringers. 1000 milliLiter(s) IV Continuous <Continuous>  levalbuterol Inhalation 0.63 milliGRAM(s) Inhalation every 6 hours  mupirocin 2% Ointment 1 Application(s) Topical two times a day  pantoprazole    Tablet 40 milliGRAM(s) Oral before breakfast  piperacillin/tazobactam IVPB.. 3.375 Gram(s) IV Intermittent every 8 hours  polyethylene glycol 3350 17 Gram(s) Oral daily  senna 8.6 milliGRAM(s) Oral Tablet - Peds 1 Tablet(s) Oral at bedtime  sertraline 25 milliGRAM(s) Oral daily  sodium chloride 3%  Inhalation 4 milliLiter(s) Inhalation every 6 hours      PMHX/PSHX:  DM (diabetes mellitus)    H/O pulmonary fibrosis    Pulmonary fibrosis    Pulmonary fibrosis    HTN (hypertension)    Chronic atrial fibrillation    Dysphagia    GERD (gastroesophageal reflux disease)    Protein calorie malnutrition    MDD (major depressive disorder)    TB (tuberculosis)    Constipation    S/P hernia repair    H/O cataract extraction    History of prostate surgery        Family history:  No pertinent family history in first degree relatives per chart review    Social History: Unable to obtain given AMS    ROS: Unable to obtain given AMS    PHYSICAL EXAM:     GENERAL:  No acute distress, patient is sleeping soundly, not responsive to sound, cachectic   HENT: NCAT; dry mucous membranes, limited evaluation  EYES: eyes closed, not following commands/cooperating with exam  NECK: Trachea midline; FROM, supple, no significant masses noted  CHEST:  no respiratory distress, clear anteriorly  HEART:  Regular rate and rhythm, no murmur  ABDOMEN:  Soft, non-tender, non-distended, normoactive bowel sounds, no masses  EXTREMITIES: No edema  SKIN:  No rash/erythema/ecchymoses/petechiae/wounds/abscess/warm/dry  NEURO:  Alert and oriented x 0, no asterixis    Vital Signs:  Vital Signs Last 24 Hrs  T(C): 36.2 (19 Jul 2022 04:37), Max: 36.7 (19 Jul 2022 03:26)  T(F): 97.2 (19 Jul 2022 04:37), Max: 98.1 (19 Jul 2022 03:26)  HR: 96 (19 Jul 2022 04:42) (76 - 107)  BP: 136/72 (19 Jul 2022 04:37) (136/72 - 168/89)  BP(mean): --  RR: 16 (19 Jul 2022 04:37) (16 - 18)  SpO2: 100% (19 Jul 2022 04:42) (94% - 100%)    Parameters below as of 19 Jul 2022 04:42  Patient On (Oxygen Delivery Method): nasal cannula      Daily     Daily     LABS:                        10.9   14.97 )-----------( 182      ( 19 Jul 2022 05:30 )             34.5     Mean Cell Volume: 97.5 fL (07-19-22 @ 05:30)    07-19    140  |  100  |  14  ----------------------------<  155<H>  5.3   |  35<H>  |  0.40<L>    Ca    9.3      19 Jul 2022 05:30  Phos  2.1     07-19  Mg     1.90     07-19                                    10.9   14.97 )-----------( 182      ( 19 Jul 2022 05:30 )             34.5                         10.3   10.24 )-----------( 147      ( 18 Jul 2022 05:25 )             30.6                         10.7   12.70 )-----------( 145      ( 17 Jul 2022 05:10 )             33.1       Imaging:

## 2022-07-19 NOTE — CONSULT NOTE ADULT - ASSESSMENT
85 yo M with PMH pulmonary fibrosis (on home 3L nasal cannula), DM, a fib (on eliquis), dementia (baseline mental status Aox1), chronic constipation, dysphagia, GERD, and malnutrition who presents with altered mental status. Palliative Care consulted for complex decision making and symptom management in the setting of advanced illness.

## 2022-07-19 NOTE — PROGRESS NOTE ADULT - SUBJECTIVE AND OBJECTIVE BOX
CHIEF COMPLAINT: Patient is a 86y old  Male who presents with a chief complaint of altered mental status (19 Jul 2022 07:29)      Interval Events:    REVIEW OF SYSTEMS:  [ ] All other systems negative  [ ] Unable to assess ROS because ________          OBJECTIVE:  ICU Vital Signs Last 24 Hrs  T(C): 36.2 (19 Jul 2022 04:37), Max: 36.7 (19 Jul 2022 03:26)  T(F): 97.2 (19 Jul 2022 04:37), Max: 98.1 (19 Jul 2022 03:26)  HR: 111 (19 Jul 2022 10:11) (76 - 112)  BP: 136/72 (19 Jul 2022 04:37) (136/72 - 168/89)  BP(mean): --  ABP: --  ABP(mean): --  RR: 16 (19 Jul 2022 04:37) (16 - 18)  SpO2: 98% (19 Jul 2022 10:11) (94% - 100%)    O2 Parameters below as of 19 Jul 2022 10:11  Patient On (Oxygen Delivery Method): nasal cannula w/ humidification              07-18 @ 07:01  -  07-19 @ 07:00  --------------------------------------------------------  IN: 600 mL / OUT: 1050 mL / NET: -450 mL      CAPILLARY BLOOD GLUCOSE      POCT Blood Glucose.: 141 mg/dL (19 Jul 2022 11:49)      HOSPITAL MEDICATIONS:  MEDICATIONS  (STANDING):  acetylcysteine 20%  Inhalation 4 milliLiter(s) Inhalation every 12 hours  buDESOnide    Inhalation Suspension 0.5 milliGRAM(s) Inhalation daily  dextrose 5% + lactated ringers. 1000 milliLiter(s) (50 mL/Hr) IV Continuous <Continuous>  dextrose 5%. 1000 milliLiter(s) (100 mL/Hr) IV Continuous <Continuous>  dextrose 5%. 1000 milliLiter(s) (50 mL/Hr) IV Continuous <Continuous>  dextrose 50% Injectable 25 Gram(s) IV Push once  dextrose 50% Injectable 12.5 Gram(s) IV Push once  dextrose 50% Injectable 25 Gram(s) IV Push once  diltiazem    milliGRAM(s) Oral daily  glucagon  Injectable 1 milliGRAM(s) IntraMuscular once  insulin lispro (ADMELOG) corrective regimen sliding scale   SubCutaneous three times a day before meals  lactated ringers. 1000 milliLiter(s) (50 mL/Hr) IV Continuous <Continuous>  levalbuterol Inhalation 0.63 milliGRAM(s) Inhalation every 6 hours  mupirocin 2% Ointment 1 Application(s) Topical two times a day  pantoprazole    Tablet 40 milliGRAM(s) Oral before breakfast  piperacillin/tazobactam IVPB.. 3.375 Gram(s) IV Intermittent every 8 hours  polyethylene glycol 3350 17 Gram(s) Oral daily  potassium phosphate / sodium phosphate Powder (PHOS-NaK) 1 Packet(s) Oral once  senna 8.6 milliGRAM(s) Oral Tablet - Peds 1 Tablet(s) Oral at bedtime  sertraline 25 milliGRAM(s) Oral daily  sodium chloride 3%  Inhalation 4 milliLiter(s) Inhalation every 6 hours    MEDICATIONS  (PRN):  acetaminophen     Tablet .. 650 milliGRAM(s) Oral every 6 hours PRN Temp greater or equal to 38C (100.4F), Moderate Pain (4 - 6)  ALPRAZolam 0.25 milliGRAM(s) Oral every 8 hours PRN agitation/anxiety  aluminum hydroxide/magnesium hydroxide/simethicone Suspension 30 milliLiter(s) Oral every 6 hours PRN Dyspepsia  bisacodyl 5 milliGRAM(s) Oral at bedtime PRN Constipation  bisacodyl Suppository 10 milliGRAM(s) Rectal daily PRN Constipation  dextrose Oral Gel 15 Gram(s) Oral once PRN Blood Glucose LESS THAN 70 milliGRAM(s)/deciliter      LABS:                        10.9   14.97 )-----------( 182      ( 19 Jul 2022 05:30 )             34.5     07-19    140  |  100  |  14  ----------------------------<  155<H>  5.3   |  35<H>  |  0.40<L>    Ca    9.3      19 Jul 2022 05:30  Phos  2.1     07-19  Mg     1.90     07-19                PAST MEDICAL & SURGICAL HISTORY:  DM (diabetes mellitus)      Pulmonary fibrosis  on home O2 and daily prednisone      HTN (hypertension)      Chronic atrial fibrillation      Dysphagia  recent cadida infection, s/p nystatin swish and swallow      GERD (gastroesophageal reflux disease)      Protein calorie malnutrition      MDD (major depressive disorder)      Constipation      S/P hernia repair      H/O cataract extraction      History of prostate surgery  prostate was &quot;scraped&quot; 2021          FAMILY HISTORY:  No pertinent family history in first degree relatives        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 never smoker. (15 Jul 2022 17:00)      RADIOLOGY:  [ ] Reviewed and interpreted by me    PULMONARY FUNCTION TESTS:    EKG: CHIEF COMPLAINT: Patient is a 86y old  Male who presents with a chief complaint of altered mental status (19 Jul 2022 07:29)    Interval Events: Pt reported to be agitated/pulling at lines/climbing out of bed, s/p Xanax given. Pt seen and examined during morning round. Pt noted lethargic, secondary to xanax given this morning vs infectious process.  ABG done in the evening, pH 7.36, pCO2 71, HCO3 40. Lactate and ammonia wnl. However, leukocytosis worsening, 14>>19. Will keep NPO for now, and broaden Abx to Vanco. Pending Rpt CXR. Will place on BIPAP this evening, and rpt CXR in the morning along w labs. Family at bedside updated, patient made DNR/DNI. Palliative following, Family is considering Home w Home hospice.     REVIEW OF SYSTEMS:  See above  [x] Unable to assess ROS because of poor mental status    OBJECTIVE:  ICU Vital Signs Last 24 Hrs  T(C): 36.2 (19 Jul 2022 04:37), Max: 36.7 (19 Jul 2022 03:26)  T(F): 97.2 (19 Jul 2022 04:37), Max: 98.1 (19 Jul 2022 03:26)  HR: 111 (19 Jul 2022 10:11) (76 - 112)  BP: 136/72 (19 Jul 2022 04:37) (136/72 - 168/89)  BP(mean): --  ABP: --  ABP(mean): --  RR: 16 (19 Jul 2022 04:37) (16 - 18)  SpO2: 98% (19 Jul 2022 10:11) (94% - 100%)    O2 Parameters below as of 19 Jul 2022 10:11  Patient On (Oxygen Delivery Method): nasal cannula w/ humidification    07-18 @ 07:01  -  07-19 @ 07:00  --------------------------------------------------------  IN: 600 mL / OUT: 1050 mL / NET: -450 mL    CAPILLARY BLOOD GLUCOSE    POCT Blood Glucose.: 141 mg/dL (19 Jul 2022 11:49)    HOSPITAL MEDICATIONS:  MEDICATIONS  (STANDING):  acetylcysteine 20%  Inhalation 4 milliLiter(s) Inhalation every 12 hours  buDESOnide    Inhalation Suspension 0.5 milliGRAM(s) Inhalation daily  dextrose 5% + lactated ringers. 1000 milliLiter(s) (50 mL/Hr) IV Continuous <Continuous>  dextrose 5%. 1000 milliLiter(s) (100 mL/Hr) IV Continuous <Continuous>  dextrose 5%. 1000 milliLiter(s) (50 mL/Hr) IV Continuous <Continuous>  dextrose 50% Injectable 25 Gram(s) IV Push once  dextrose 50% Injectable 12.5 Gram(s) IV Push once  dextrose 50% Injectable 25 Gram(s) IV Push once  diltiazem    milliGRAM(s) Oral daily  glucagon  Injectable 1 milliGRAM(s) IntraMuscular once  insulin lispro (ADMELOG) corrective regimen sliding scale   SubCutaneous three times a day before meals  lactated ringers. 1000 milliLiter(s) (50 mL/Hr) IV Continuous <Continuous>  levalbuterol Inhalation 0.63 milliGRAM(s) Inhalation every 6 hours  mupirocin 2% Ointment 1 Application(s) Topical two times a day  pantoprazole    Tablet 40 milliGRAM(s) Oral before breakfast  piperacillin/tazobactam IVPB.. 3.375 Gram(s) IV Intermittent every 8 hours  polyethylene glycol 3350 17 Gram(s) Oral daily  potassium phosphate / sodium phosphate Powder (PHOS-NaK) 1 Packet(s) Oral once  senna 8.6 milliGRAM(s) Oral Tablet - Peds 1 Tablet(s) Oral at bedtime  sertraline 25 milliGRAM(s) Oral daily  sodium chloride 3%  Inhalation 4 milliLiter(s) Inhalation every 6 hours    MEDICATIONS  (PRN):  acetaminophen     Tablet .. 650 milliGRAM(s) Oral every 6 hours PRN Temp greater or equal to 38C (100.4F), Moderate Pain (4 - 6)  ALPRAZolam 0.25 milliGRAM(s) Oral every 8 hours PRN agitation/anxiety  aluminum hydroxide/magnesium hydroxide/simethicone Suspension 30 milliLiter(s) Oral every 6 hours PRN Dyspepsia  bisacodyl 5 milliGRAM(s) Oral at bedtime PRN Constipation  bisacodyl Suppository 10 milliGRAM(s) Rectal daily PRN Constipation  dextrose Oral Gel 15 Gram(s) Oral once PRN Blood Glucose LESS THAN 70 milliGRAM(s)/deciliter    PHYSICAL EXAM  GENERAL: Laying in bed in mild respiratory distress, +cachetic  HEENT: dry mucus membranes  CARD: +S1, s2  LUNGS: mild labored breathing, +rhonchi throughout  GI: +BS, soft, nt/nd, no peritoneal signs noted  Ext: No cyanosis, no calf tenderness   Neuro: alert and oriented x O (1-2 at baseline)    LABS:                        10.9   14.97 )-----------( 182      ( 19 Jul 2022 05:30 )             34.5     07-19    140  |  100  |  14  ----------------------------<  155<H>  5.3   |  35<H>  |  0.40<L>    Ca    9.3      19 Jul 2022 05:30  Phos  2.1     07-19  Mg     1.90     07-19    PAST MEDICAL & SURGICAL HISTORY:  DM (diabetes mellitus)    Pulmonary fibrosis  on home O2 and daily prednisone    HTN (hypertension)    Chronic atrial fibrillation    Dysphagia  recent cadida infection, s/p nystatin swish and swallow    GERD (gastroesophageal reflux disease)    Protein calorie malnutrition    MDD (major depressive disorder)    Constipation    S/P hernia repair    H/O cataract extraction    History of prostate surgery  prostate was &quot;scraped&quot; 2021    FAMILY HISTORY:  No pertinent family history in first degree relatives    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 never smoker. (15 Jul 2022 17:00)    RADIOLOGY:  [ ] Reviewed and interpreted by me    PULMONARY FUNCTION TESTS:    EKG:

## 2022-07-19 NOTE — CONSULT NOTE ADULT - PROBLEM SELECTOR RECOMMENDATION 5
Pt is full code - advanced directives addressed, family will f/u with team with decision  In agreement with symptom management with opioids  Page for uncontrolled symptoms [id number]

## 2022-07-19 NOTE — CONSULT NOTE ADULT - ATTENDING COMMENTS
#Rectal/outlet bleeding, FOBT+ with stable Hgb 10s  #CT with large fecal load in rectum and previously CT findings with changes suggestive of stercoral colitis    --Consider disimpaction if patient symptomatic and not responding to ongoing bowel regimen  --No plan for endoscopic evaluation per family wishes  --If patient going home with home hospice, may consider risks/benefits of resuming A/C especially given recurrent bleeding

## 2022-07-20 NOTE — PROVIDER CONTACT NOTE (CRITICAL VALUE NOTIFICATION) - SITUATION
PCO2 74.
Pt critical lab value; glucose 599.
Pt has critical lab value; pH 7.2 and pCO2 103
Pt pCO2 106.

## 2022-07-20 NOTE — PROGRESS NOTE ADULT - ASSESSMENT
85 yo M with PMH pulmonary fibrosis (on home 3L nasal cannula), DM, a fib (on eliquis), dementia (baseline mental status Aox1), chronic constipation, dysphagia, GERD, and malnutrition who presents with altered mental status and suspicion of GI bleed from nursing home. Found to have pneumothorax incidentally on imaging, hemodynamically stable and on home O2 of nasal cannula 4 L. Admitted to [facility] for monitoring.      # NEURO  - Baseline mental status Aox1 per daughter, However today, Alert and oriented x 0  - c/w home sertraline and Hold home xanax prn for now in settings of acute worsening mental status    - CT head -No acute transcortical infarct or intracranial hemorrhage. White matter small vessel disease.  - Ammonia wnl     # CARDIOVASCULAR: recently dx with a fib   - hold Eliquis in setting of possible GI bleed   - Will hold Home dose Cardizem 240mg PO daily for now since NPO  - Will start IV Lopressor 2.5mg IV q8hrs  - Monitor HR/BP closely    # RESPIRATORY  - found to have small L PTX incidentally on CT abd. Hemodynamically stable, no increase in O2 requirement.  - continue home inhalers: incruse ellipta, pulmicort, and Hypersal  - c/w Xopenex - HR much improved   - d'storm solumedrol 20 mg daily on 7/18  - CT chest as noted above   - Rpt CXR - Stable small left pneumothorax and diffuse bilateral patchy and reticular opacities.  - ABG pH 7.36, pC02 71, HCO3 40  - Will start patient on BIPAP 10/5, FIO2 35% - will adjust as needed in settings of increase work of breathing  - Will f/u with AM CXR , and ABG this evening    # INFECTIOUS DISEASE  - Leukocytosis up trending again 18>>13>>12>>10>>14>>19 with lethargy   - monitor fever curve, wbc count  - s/p 1 dose unasyn 7/15, and Vanco d'storm on 7/18 (MRSA PCR Neg)   - Staph areus + - started on Bactroban 1app BID for 5 days   - UA+/ urine cx >100k GNR, +pseudomonas, sens to zosyn  - c/w Zosyn (7/15 - ...)  - Worsening Leukocytosis today, s/p one dose of Vanco 1g on 7/19  - Lactate wnl     # GI: hx of colitis, CT AP with significant stool burden, s/p disimpaction in ED 7/15. Also suspicion of GI bleed given dark stools noted in nursing home, although hemoglobin stable at baseline compared to CBC 2 weeks prior.  - continue enema   - Rpt Speech and Swallow done 7/18 - Pureed with Moderate thick liquid - Will consider CINE when appropriate   - NPO for now due to worsening mental status  - c/w D5NS @65cc/hr    # HEME  - sent from nursing home with black stool and suspicion of GI bleed  - hold Eliquis (for a fib) in setting of possible bleed  - monitor stool color  - serial CBC   +FOBT, GI following, no intervention at this time. - Family does not wish any invasive procedures    # ENDOCRINE  - hx of DM   - hold repaglinide while inpatient  - ISS q 6 hr if needed    #   +Varma - monitor UOP closely  - Hyperkalemia noted in evening lab - K 5.9  s/p Regular insulin 10 and D50 25 gram   - Will repeat in morning lab    # GOC  - discussed with daughter at bedside and over the phone (Beti/Mena) - Pt is now DNR/DNI - MOLST in chart    # DVT ppx: hold in setting of possible GI bleed, SCDs       87 yo M with PMH pulmonary fibrosis (on home 3L nasal cannula), DM, a fib (on eliquis), dementia (baseline mental status Aox1), chronic constipation, dysphagia, GERD, and malnutrition who presents with altered mental status and suspicion of GI bleed from nursing home. Found to have pneumothorax incidentally on imaging, hemodynamically stable and on home O2 of nasal cannula 4 L. Admitted to RCU for monitoring.      # NEURO  - Baseline mental status Aox1 per daughter, However today, Alert and oriented x 0  - c/w home sertraline and Hold home xanax prn for now in settings of acute worsening mental status    - CT head -No acute transcortical infarct or intracranial hemorrhage. White matter small vessel disease.  - Ammonia wnl     # CARDIOVASCULAR: recently dx with a fib   - hold Eliquis in setting of possible GI bleed   - Will hold Home dose Cardizem 240mg PO daily for now since NPO  - Will start IV Lopressor 2.5mg IV q8hrs  - Monitor HR/BP closely    # RESPIRATORY  - found to have small L PTX incidentally on CT abd. Hemodynamically stable, no increase in O2 requirement.  - continue home inhalers: incruse ellipta, pulmicort, and Hypersal  - c/w Xopenex - HR much improved   - d'storm solumedrol 20 mg daily on 7/18  - CT chest as noted above   - Rpt CXR - Stable small left pneumothorax and diffuse bilateral patchy and reticular opacities.  - ABG pH 7.36, pC02 71, HCO3 40  - Will start patient on BIPAP 10/5, FIO2 35% - will adjust as needed in settings of increase work of breathing  - Tolerated some VentiMask during the daytime (7/20)   - Family preferring to keep patient on VentiMask even at nightteam given more tolerable.     # INFECTIOUS DISEASE  - Leukocytosis up trending again 18>>13>>12>>10>>14>>19 with lethargy   - monitor fever curve, wbc count  - s/p 1 dose unasyn 7/15, and Vanco d'storm on 7/18 (MRSA PCR Neg)   - Staph areus + - started on Bactroban 1app BID for 5 days   - UA+/ urine cx >100k GNR, +pseudomona & c/w Zosyn (7/15 - ...)  - Worsening Leukocytosis (7/19) & s/p one dose of Vanco 1g on 7/19  - Lactate wnl     # GI: hx of colitis, CT AP with significant stool burden, s/p disimpaction in ED 7/15. Also suspicion of GI bleed given dark stools noted in nursing home, although hemoglobin stable at baseline compared to CBC 2 weeks prior.  - continue enema   - Rpt Speech and Swallow done 7/18 - Pureed with Moderate thick liquid - Will consider CINE when appropriate   - NPO for now due to worsening mental status  - c/w D5NS @65cc/hr  - Family declining NGT placement (7/20)    # HEME  - sent from nursing home with black stool and suspicion of GI bleed  - hold Eliquis (for a fib) in setting of possible bleed  - monitor stool color  - serial CBC   +FOBT, GI following, no intervention at this time. - Family does not wish any invasive procedures    # ENDOCRINE  - hx of DM   - hold repaglinide while inpatient  - ISS q 6 hr if needed    #   +Varma - monitor UOP closely  - Hyperkalemia noted in evening lab - K 5.9  s/p Regular insulin 10 and D50 25 gram   - Potassium now normalized     # GOC  - discussed with daughter at bedside and over the phone (Beti/Mena) - Pt is now DNR/DNI - MOLST in chart. Family requesting hospice eval - sent 7/19.     # DVT ppx: hold in setting of possible GI bleed, SCDs

## 2022-07-20 NOTE — PROGRESS NOTE ADULT - SUBJECTIVE AND OBJECTIVE BOX
CHIEF COMPLAINT:    INTERVAL EVENTS:     ROS: Seen by bedside during AM rounds     OBJECTIVE:  ICU Vital Signs Last 24 Hrs  T(C): 36.4 (20 Jul 2022 05:20), Max: 36.4 (20 Jul 2022 05:20)  T(F): 97.5 (20 Jul 2022 05:20), Max: 97.5 (20 Jul 2022 05:20)  HR: 101 (20 Jul 2022 05:20) (85 - 116)  BP: 130/76 (20 Jul 2022 05:20) (130/76 - 143/82)  BP(mean): --  ABP: --  ABP(mean): --  RR: 33 (20 Jul 2022 05:20) (18 - 33)  SpO2: 98% (20 Jul 2022 05:20) (98% - 100%)    O2 Parameters below as of 20 Jul 2022 05:20  Patient On (Oxygen Delivery Method): BiPAP/CPAP    O2 Concentration (%): 45          07-19 @ 07:01  -  07-20 @ 07:00  --------------------------------------------------------  IN: 980 mL / OUT: 750 mL / NET: 230 mL      CAPILLARY BLOOD GLUCOSE      POCT Blood Glucose.: 140 mg/dL (20 Jul 2022 05:34)      PHYSICAL EXAM:  General:   HEENT:   Lymph Nodes:  Neck:   Respiratory:   Cardiovascular:   Abdomen:   Extremities:   Skin:   Neurological:  Psychiatry:        HOSPITAL MEDICATIONS:  MEDICATIONS  (STANDING):  acetylcysteine 20%  Inhalation 4 milliLiter(s) Inhalation every 12 hours  buDESOnide    Inhalation Suspension 0.5 milliGRAM(s) Inhalation daily  dextrose 5% + sodium chloride 0.9%. 1000 milliLiter(s) (65 mL/Hr) IV Continuous <Continuous>  dextrose 5%. 1000 milliLiter(s) (100 mL/Hr) IV Continuous <Continuous>  dextrose 5%. 1000 milliLiter(s) (50 mL/Hr) IV Continuous <Continuous>  dextrose 50% Injectable 25 Gram(s) IV Push once  dextrose 50% Injectable 12.5 Gram(s) IV Push once  dextrose 50% Injectable 25 Gram(s) IV Push once  diltiazem    milliGRAM(s) Oral daily  glucagon  Injectable 1 milliGRAM(s) IntraMuscular once  insulin lispro (ADMELOG) corrective regimen sliding scale   SubCutaneous every 6 hours  levalbuterol Inhalation 0.63 milliGRAM(s) Inhalation every 6 hours  metoprolol tartrate Injectable 2.5 milliGRAM(s) IV Push every 8 hours  mupirocin 2% Ointment 1 Application(s) Topical two times a day  pantoprazole    Tablet 40 milliGRAM(s) Oral before breakfast  piperacillin/tazobactam IVPB.. 3.375 Gram(s) IV Intermittent every 8 hours  polyethylene glycol 3350 17 Gram(s) Oral daily  senna 8.6 milliGRAM(s) Oral Tablet - Peds 1 Tablet(s) Oral at bedtime  sertraline 25 milliGRAM(s) Oral daily  sodium chloride 3%  Inhalation 4 milliLiter(s) Inhalation every 6 hours    MEDICATIONS  (PRN):  acetaminophen     Tablet .. 650 milliGRAM(s) Oral every 6 hours PRN Temp greater or equal to 38C (100.4F), Moderate Pain (4 - 6)  ALPRAZolam 0.25 milliGRAM(s) Oral every 8 hours PRN agitation/anxiety  aluminum hydroxide/magnesium hydroxide/simethicone Suspension 30 milliLiter(s) Oral every 6 hours PRN Dyspepsia  bisacodyl 5 milliGRAM(s) Oral at bedtime PRN Constipation  bisacodyl Suppository 10 milliGRAM(s) Rectal daily PRN Constipation  dextrose Oral Gel 15 Gram(s) Oral once PRN Blood Glucose LESS THAN 70 milliGRAM(s)/deciliter  morphine  - Injectable 0.5 milliGRAM(s) IV Push every 4 hours PRN dyspnea/pain/increased work of breathing      LABS:                        9.6    19.70 )-----------( 163      ( 20 Jul 2022 04:56 )             32.9     07-20    142  |  105  |  20  ----------------------------<  599<HH>  4.7   |  31  |  0.59    Ca    8.0<L>      20 Jul 2022 04:56  Phos  3.6     07-20  Mg     1.70     07-20    TPro  5.7<L>  /  Alb  2.7<L>  /  TBili  0.7  /  DBili  x   /  AST  10  /  ALT  15  /  AlkPhos  102  07-19        Arterial Blood Gas:  07-20 @ 00:56  7.20/103/76/40/91.0/8.2  ABG lactate: --  Arterial Blood Gas:  07-19 @ 21:30  7.21/106/125/42/99.0/10.1  ABG lactate: --  Arterial Blood Gas:  07-19 @ 15:56  7.36/71/73/40/95.7/11.5  ABG lactate: --     CHIEF COMPLAINT: Patient is a 86y old  Male who presents with a chief complaint of altered mental status (20 Jul 2022 07:03)      INTERVAL EVENTS: yesterday began requiring BIPAP use. Overnight was persistently hypercapnic (pCO2 in the 100s) and BIPAP settings adjusted with improvement in pCO2 this morning (~70). Opens eyes sometimes, intermittent agitation noted with pulling at mask, other times less responsive     ROS: Unable to obtain ROS given patient is lethargic and does not reply to verbal stimuli.    OBJECTIVE:  ICU Vital Signs Last 24 Hrs  T(C): 36.4 (20 Jul 2022 05:20), Max: 36.4 (20 Jul 2022 05:20)  T(F): 97.5 (20 Jul 2022 05:20), Max: 97.5 (20 Jul 2022 05:20)  HR: 101 (20 Jul 2022 05:20) (85 - 116)  BP: 130/76 (20 Jul 2022 05:20) (130/76 - 143/82)  BP(mean): --  ABP: --  ABP(mean): --  RR: 33 (20 Jul 2022 05:20) (18 - 33)  SpO2: 98% (20 Jul 2022 05:20) (98% - 100%)    O2 Parameters below as of 20 Jul 2022 05:20  Patient On (Oxygen Delivery Method): BiPAP/CPAP    O2 Concentration (%): 45          07-19 @ 07:01  -  07-20 @ 07:00  --------------------------------------------------------  IN: 980 mL / OUT: 750 mL / NET: 230 mL      CAPILLARY BLOOD GLUCOSE      POCT Blood Glucose.: 140 mg/dL (20 Jul 2022 05:34)      PHYSICAL EXAM:  General: Thin elderly male laying in bed appearing uncomforrtable. Tachypneic   Neck: Trachea midline.   Cards: S1/S2, no murmurs   Pulm: Coarse rhonchi b/l. Tachypneic On BIPAP.   Abdomen: Soft, NTND.   Extremities: No pedal edema. Extremities warm to touch.  Neurology: Lethargic. Opens eyes slightly to vigorous physical stimuli. Not following commands.   Skin: refer to Rn assessmen.t       HOSPITAL MEDICATIONS:  MEDICATIONS  (STANDING):  acetylcysteine 20%  Inhalation 4 milliLiter(s) Inhalation every 12 hours  buDESOnide    Inhalation Suspension 0.5 milliGRAM(s) Inhalation daily  dextrose 5% + sodium chloride 0.9%. 1000 milliLiter(s) (65 mL/Hr) IV Continuous <Continuous>  dextrose 5%. 1000 milliLiter(s) (100 mL/Hr) IV Continuous <Continuous>  dextrose 5%. 1000 milliLiter(s) (50 mL/Hr) IV Continuous <Continuous>  dextrose 50% Injectable 25 Gram(s) IV Push once  dextrose 50% Injectable 12.5 Gram(s) IV Push once  dextrose 50% Injectable 25 Gram(s) IV Push once  diltiazem    milliGRAM(s) Oral daily  glucagon  Injectable 1 milliGRAM(s) IntraMuscular once  insulin lispro (ADMELOG) corrective regimen sliding scale   SubCutaneous every 6 hours  levalbuterol Inhalation 0.63 milliGRAM(s) Inhalation every 6 hours  metoprolol tartrate Injectable 2.5 milliGRAM(s) IV Push every 8 hours  mupirocin 2% Ointment 1 Application(s) Topical two times a day  pantoprazole    Tablet 40 milliGRAM(s) Oral before breakfast  piperacillin/tazobactam IVPB.. 3.375 Gram(s) IV Intermittent every 8 hours  polyethylene glycol 3350 17 Gram(s) Oral daily  senna 8.6 milliGRAM(s) Oral Tablet - Peds 1 Tablet(s) Oral at bedtime  sertraline 25 milliGRAM(s) Oral daily  sodium chloride 3%  Inhalation 4 milliLiter(s) Inhalation every 6 hours    MEDICATIONS  (PRN):  acetaminophen     Tablet .. 650 milliGRAM(s) Oral every 6 hours PRN Temp greater or equal to 38C (100.4F), Moderate Pain (4 - 6)  ALPRAZolam 0.25 milliGRAM(s) Oral every 8 hours PRN agitation/anxiety  aluminum hydroxide/magnesium hydroxide/simethicone Suspension 30 milliLiter(s) Oral every 6 hours PRN Dyspepsia  bisacodyl 5 milliGRAM(s) Oral at bedtime PRN Constipation  bisacodyl Suppository 10 milliGRAM(s) Rectal daily PRN Constipation  dextrose Oral Gel 15 Gram(s) Oral once PRN Blood Glucose LESS THAN 70 milliGRAM(s)/deciliter  morphine  - Injectable 0.5 milliGRAM(s) IV Push every 4 hours PRN dyspnea/pain/increased work of breathing      LABS:                        9.6    19.70 )-----------( 163      ( 20 Jul 2022 04:56 )             32.9     07-20    142  |  105  |  20  ----------------------------<  599<HH>  4.7   |  31  |  0.59    Ca    8.0<L>      20 Jul 2022 04:56  Phos  3.6     07-20  Mg     1.70     07-20    TPro  5.7<L>  /  Alb  2.7<L>  /  TBili  0.7  /  DBili  x   /  AST  10  /  ALT  15  /  AlkPhos  102  07-19        Arterial Blood Gas:  07-20 @ 00:56  7.20/103/76/40/91.0/8.2  ABG lactate: --  Arterial Blood Gas:  07-19 @ 21:30  7.21/106/125/42/99.0/10.1  ABG lactate: --  Arterial Blood Gas:  07-19 @ 15:56  7.36/71/73/40/95.7/11.5  ABG lactate: --

## 2022-07-20 NOTE — PROVIDER CONTACT NOTE (CRITICAL VALUE NOTIFICATION) - ACTION/TREATMENT ORDERED:
BIPAP settings changed. PRN morphine held. ABG in the AM.
BIPAP settings changed. Will continue to monitor.
Provider aware. No further actions at this time.
Fingerstick ordered and completed. Will order as necessary.

## 2022-07-20 NOTE — PROVIDER CONTACT NOTE (CRITICAL VALUE NOTIFICATION) - BACKGROUND
Pulmonary fibrosis, Dysphagia, GERD, HTN, DM, abdominal pain
Abdominal Pain, Pulmonary fibrosis, GERD, HTN, DM, Chronic atrial fibrillation
Admitted for suspicion of GI bleed.
Abdominal pain, dysphagia, pulmonary fibrosis, GERD, HTN, DM

## 2022-07-21 NOTE — PROVIDER CONTACT NOTE (OTHER) - ASSESSMENT
Pt c/o chest pain.  previously tachycardic with pain.
Patient complaining of constipation, and abdominal pain.
Pt temperature 96.3F. , /61, RR 23, SpO2 100% on BIPAP at 35%.
Patient agitated and restless.
Patient with complaint of abdominal pain. Noted with bright red blood in stool.
Patient c/o abdominal pain/discomfort.Vitals WNL
pulse and b/p stable, refer to flowsheet
Pt . Previously tachycardic, lopressor 2.5 due at 1400.

## 2022-07-21 NOTE — PROVIDER CONTACT NOTE (OTHER) - DATE AND TIME:
21-Jul-2022 04:45
17-Jul-2022 11:40
19-Jul-2022 22:10
16-Jul-2022 22:30
17-Jul-2022 07:19
17-Jul-2022 03:00
17-Jul-2022 03:20
18-Jul-2022 23:15

## 2022-07-21 NOTE — PROGRESS NOTE ADULT - SUBJECTIVE AND OBJECTIVE BOX
SUBJECTIVE AND OBJECTIVE:  Pt now lethargic  Continues to be tachypneic     Indication for Geriatrics and Palliative Care Services/INTERVAL HPI: symptom management and complex decision making in the setting of advanced illness    OVERNIGHT EVENTS: pt used 1 PRN in the past 24hrs    DNR on chart: Yes  Yes      Allergies    No Known Allergies    Intolerances    MEDICATIONS  (STANDING):  acetylcysteine 20%  Inhalation 4 milliLiter(s) Inhalation every 12 hours  buDESOnide    Inhalation Suspension 0.5 milliGRAM(s) Inhalation daily  chlorhexidine 2% Cloths 1 Application(s) Topical daily  dextrose 5%. 1000 milliLiter(s) (100 mL/Hr) IV Continuous <Continuous>  dextrose 5%. 1000 milliLiter(s) (50 mL/Hr) IV Continuous <Continuous>  dextrose 50% Injectable 25 Gram(s) IV Push once  dextrose 50% Injectable 12.5 Gram(s) IV Push once  dextrose 50% Injectable 25 Gram(s) IV Push once  diltiazem    milliGRAM(s) Oral daily  glucagon  Injectable 1 milliGRAM(s) IntraMuscular once  insulin lispro (ADMELOG) corrective regimen sliding scale   SubCutaneous every 6 hours  lactated ringers. 1000 milliLiter(s) (65 mL/Hr) IV Continuous <Continuous>  levalbuterol Inhalation 0.63 milliGRAM(s) Inhalation every 6 hours  metoprolol tartrate Injectable 2.5 milliGRAM(s) IV Push every 8 hours  mupirocin 2% Ointment 1 Application(s) Topical two times a day  pantoprazole    Tablet 40 milliGRAM(s) Oral before breakfast  piperacillin/tazobactam IVPB.. 3.375 Gram(s) IV Intermittent every 8 hours  polyethylene glycol 3350 17 Gram(s) Oral daily  senna 8.6 milliGRAM(s) Oral Tablet - Peds 1 Tablet(s) Oral at bedtime  sertraline 25 milliGRAM(s) Oral daily  sodium chloride 3%  Inhalation 4 milliLiter(s) Inhalation every 6 hours    MEDICATIONS  (PRN):  acetaminophen     Tablet .. 650 milliGRAM(s) Oral every 6 hours PRN Temp greater or equal to 38C (100.4F), Moderate Pain (4 - 6)  ALPRAZolam 0.25 milliGRAM(s) Oral every 8 hours PRN agitation/anxiety  aluminum hydroxide/magnesium hydroxide/simethicone Suspension 30 milliLiter(s) Oral every 6 hours PRN Dyspepsia  bisacodyl 5 milliGRAM(s) Oral at bedtime PRN Constipation  bisacodyl Suppository 10 milliGRAM(s) Rectal daily PRN Constipation  dextrose Oral Gel 15 Gram(s) Oral once PRN Blood Glucose LESS THAN 70 milliGRAM(s)/deciliter  morphine  - Injectable 0.5 milliGRAM(s) IV Push every 4 hours PRN dyspnea/pain/increased work of breathing      ITEMS UNCHECKED ARE NOT PRESENT    PRESENT SYMPTOMS: [ x]Unable to self-report - see [ ] CPOT [x ] PAINADS [ x] RDOS  Source if other than patient:  [ ]Family   [ ]Team     Pain:  [ ]yes [ ]no  QOL impact -   Location -                    Aggravating factors -  Quality -  Radiation -  Timing-  Severity (0-10 scale):  Minimal acceptable level (0-10 scale):     CPOT:    https://www.[facility].org/getattachment/[email or web address]/Critical-Care-Pain-Observation-Tool-(CPOT)    PAIN AD Score:	2  http://geriatrictoolkit.[email or web address]/cog/painad.pdf (Ctrl + left click to view)    Dyspnea:                           [ ]Mild [ ]Moderate [ ]Severe    RDOS: 6  0 to 2  minimal or no respiratory distress   3  mild distress  4 to 6 moderate distress  >7 severe distress  https://homecareinformation.net/handouts/hen/Respiratory_Distress_Observation_Scale.pdf (Ctrl +  left click to view)     Anxiety:                             [ ]Mild [ ]Moderate [ ]Severe  Fatigue:                             [ ]Mild [ ]Moderate [ ]Severe  Nausea:                             [ ]Mild [ ]Moderate [ ]Severe  Loss of appetite:              [ ]Mild [ ]Moderate [ ]Severe  Constipation:                    [ ]Mild [ ]Moderate [ ]Severe    PCSSQ[Palliative Care Spiritual Screening Question]   Severity (0-10): unable to assess  Score of 4 or > indicate consideration of Chaplaincy referral.    Chaplaincy Referral: n/a  [ ] yes [ ] refused [ ] following    Other Symptoms:  [ ]All other review of systems negative     Palliative Performance Status Version 2: 30 %      http://npcrc.org/files/news/palliative_performance_scale_ppsv2.pdf    PHYSICAL EXAM:  Vital Signs Last 24 Hrs  T(C): 36.3 (21 Jul 2022 13:30), Max: 36.9 (20 Jul 2022 22:30)  T(F): 97.3 (21 Jul 2022 13:30), Max: 98.5 (20 Jul 2022 22:30)  HR: 100 (21 Jul 2022 19:59) (83 - 106)  BP: 148/52 (21 Jul 2022 13:30) (124/55 - 148/52)  BP(mean): 76 (21 Jul 2022 04:35) (76 - 89)  RR: 23 (21 Jul 2022 13:30) (18 - 23)  SpO2: 98% (21 Jul 2022 19:59) (98% - 100%)    Parameters below as of 21 Jul 2022 13:30  Patient On (Oxygen Delivery Method): nasal cannula, high flow  O2 Flow (L/min): 15   I&O's Summary    20 Jul 2022 07:01  -  21 Jul 2022 07:00  --------------------------------------------------------  IN: 1760 mL / OUT: 600 mL / NET: 1160 mL    21 Jul 2022 07:01  -  21 Jul 2022 20:51  --------------------------------------------------------  IN: 0 mL / OUT: 200 mL / NET: -200 mL    GENERAL: [ ]Cachexia    [ ]Alert  [ ]Oriented x   [x ]Lethargic  [ ]Unarousable  [ ]Verbal  [ ]Non-Verbal  Behavioral:   [ ]Anxiety  x[ ]Delirium [ ]Agitation [ ]Other  HEENT:  [ ]Normal   [ x]Dry mouth   [ ]ET Tube/Trach  [ ]Oral lesions  PULMONARY:   [ ]Clear [x ]Tachypnea  [ ]Audible excessive secretions   [x ]Rhonchi        [ ]Right [ ]Left [ ]Bilateral  [ ]Crackles        [ ]Right [ ]Left [ ]Bilateral  [ ]Wheezing     [ ]Right [ ]Left [ ]Bilateral  [ ]Diminished BS [ ] Right [ ]Left [ ]Bilateral  CARDIOVASCULAR:    [ ]Regular [ ]Irregular x[ ]Tachy  [ ]Kemal [ ]Murmur [ ]Other  GASTROINTESTINAL:  [x ]Soft  [x ]Distended   [x ]+BS  [ ]Non tender [ ]Tender  [ ]Other [ ]PEG [ ]OGT/ NGT   Last BM: 7/21  GENITOURINARY:  [ ]Normal [ ]Incontinent   [ ]Oliguria/Anuria   [x ]Varma  MUSCULOSKELETAL:   [ ]Normal   [ ]Weakness  [ x]Bed/Wheelchair bound [ ]Edema  NEUROLOGIC:   [ ]No focal deficits  [ x] Cognitive impairment  [ ] Dysphagia [ ]Dysarthria [ ] Paresis [ ]Other   SKIN:   [x ]Normal  [ ]Rash  [ ]Other  [ ]Pressure ulcer(s) [ ]y [ ]n present on admission    CRITICAL CARE:  [ ]Shock Present  [ ]Septic [ ]Cardiogenic [ ]Neurologic [ ]Hypovolemic  [ ]Vasopressors [ ]Inotropes  [ ]Respiratory failure present [ ]Mechanical Ventilation [ ]Non-invasive ventilatory support [ ]High-Flow   [ ]Acute  [ ]Chronic [ ]Hypoxic  [ ]Hypercarbic [ ]Other  [ ]Other organ failure     LABS:                        9.6    13.62 )-----------( 164      ( 21 Jul 2022 05:00 )             32.4   07-21    146<H>  |  105  |  27<H>  ----------------------------<  212<H>  4.1   |  36<H>  |  0.59    Ca    9.3      21 Jul 2022 05:00  Phos  3.9     07-21  Mg     2.00     07-21    RADIOLOGY & ADDITIONAL STUDIES: reviewed    Protein Calorie Malnutrition Present: [ ]mild [ ]moderate [ ]severe [ ]underweight [ ]morbid obesity  https://www.andeal.org/vault/7440/web/files/ONC/Table_Clinical%20Characteristics%20to%20Document%20Malnutrition-White%20JV%20et%20al%202012.pdf    Height (cm): 154.9 (07-15-22 @ 09:46), 154.9 (06-21-22 @ 22:42)  Weight (kg): 40.4 (07-15-22 @ 19:52), 51 (06-21-22 @ 22:42)  BMI (kg/m2): 16.8 (07-15-22 @ 19:52), 21.3 (07-15-22 @ 09:46), 21.3 (06-21-22 @ 22:42)    [x ]PPSV2 < or = 30%  [ ]significant weight loss [ ]poor nutritional intake [ ]anasarca[ ]Artificial Nutrition    Other REFERRALS:  [ ]Hospice  [ ]Child Life  [ ]Social Work  [ ]Case management [ ]Holistic Therapy     Goals of Care Document:

## 2022-07-21 NOTE — PROVIDER CONTACT NOTE (OTHER) - REASON
Bloody stool
Pt .
Pt temperature 96.3F.
Agitation, pulling at oneill
Patient c/o abdominal pain
hypothermic
Pt c/o chest pain
Constipation

## 2022-07-21 NOTE — PROVIDER CONTACT NOTE (OTHER) - RECOMMENDATIONS
HARRIET Preston made aware. PRN Suppository given. Will continue to monitor.
Notify the provider
Notify provider.
HARRIET Preston made aware. Ordered unsecured mittens to be placed for patient safety.
HARRIET Preston made aware. No new orders at this time. AM lab work sent. Will continue to monitor.
Notify provider.
Provider notified.
extra sheet and blanket on patient

## 2022-07-21 NOTE — PROVIDER CONTACT NOTE (OTHER) - NAME OF MD/NP/PA/DO NOTIFIED:
HARRIET Preston
HARRIET Preston
Pebbles LARIOS
Tayla Mccoy, NP
HARRIET Preston
Pebbles LARIOS
Tayla Mccoy
Mario LARIOS

## 2022-07-21 NOTE — PROGRESS NOTE ADULT - PROBLEM SELECTOR PLAN 1
+Tachypnea  On non rebreather mask, intermittent BIPAP  Daughter at bedside, raises concern about use of morphine/opioids to treat symptoms, it's her belief that the opioids are making him more sedated. Addressed concerns and reassured that lowest dose to treat symptoms is being used, given pt is DNR/DNI, RR should be kept <25x' to avoid pt suffering.  Support provided   Would recommend:  - Continue with Morphine IV PRN, goal is RR< 25  - Glyco for increased secretions

## 2022-07-21 NOTE — PROGRESS NOTE ADULT - NS ATTEND AMEND GEN_ALL_CORE FT
-    86M PMH pulmonary fibrosis (on home 3L nasal cannula), DM, a fib (on eliquis), mild dementia (baseline mental status AAOx1-2), chronic constipation, dysphagia, GERD, and malnutrition who presents with acute encephalopathy and GI bleeding due to stercoral colitis from nursing home, s/p disimpaction. Found incidentally to have small pneumothorax not requiring chest tube placement. Now with worsening hypoxemic and hypercapnic respiratory failure due to superimposed aspiration pneumonia. Also found to have Pseudomonas aeruginosa UTI with acute pyelonephritis, on Zosyn. Appears to be approaching end of life, family requesting home hospice. He is DNR/DNI.    - Morphine prn pain/dyspnea  - Alprazolam prn anxiety/agitation  - Appreciate palliative care evaluation  - Supplemental oxygent with nasal cannula or venti mask for goal SpO2>90%  - Continue BiPAP overnight for hypercapnia likely due to FTT/malnutrition/aspiration  - Nasotracheal/Orotracheal suctioning for thick upper respiratory secretions  - Airway clearance therapy with chest PT, chest vest, levalbuterol, ipratropium, and hypertonic saline  - Continue empiric Zosyn for aspiration pneumonia + Pseudomonas aeruginosa UTI with acute pyelonephritis  - HD stable currently, continue metoprolol IV, restart diltiazem if able to take po  - D5NS@65mL/hr given that he is unable to eat. Family declining feeding tube at this time  - Hold steroids at this time  - Bowel regimen for stool impaction with stercoral colitis  - Discussed with daughter at bedside, DNR/DNI, hoping to go home with home hospice
86M PMH pulmonary fibrosis (on home 3L nasal cannula), DM, a fib (on eliquis), mild dementia (baseline mental status AAOx1-2), chronic constipation, dysphagia, GERD, and malnutrition who presents with altered mental status and suspicion of GI bleed from nursing home. Found to have pneumothorax incidentally on imaging, S/p disimpaction for stercoral colitis. He is on baseline home oxygen. Found to have UTI with acute pyelonephritis, on Zosyn. RVP, blood cultures, and nasal MRSA negative.     1. Neuro: acute encephalopathy likely due to sepsis. Continue home sertraline  2. CV: HD stable, restart diltiazem per home regimen  3. Pulm: underlying pulmonary fibrosis with superimposed patchy and consolidative opacities suggestive of infection/inflammation. Has productive cough with noted cachexia on exam. Continue empiric Zosyn. Chest PT. Airway clearance therapy with chest vest, levalbuterol, ipratropium, and hypertonic saline. On methylprednisolone for superimposed inflammation. Supplemental oxygen with NC@3-4 LPM, goal SpO2>90%. Tiny PTX on chest CT, hold off on further intervention at this time  4. GI: appreciate S&S eval, advance diet to pureed with moderately thickened liquids. Plan for MBS tomorrow. Bowel regimen  5. Renal: stable kidney function and lytes, strict I/Os  6. ID: UTI with bilateral acute pyelonephritis and possible superimposed pneumonia. Continue Zosyn. Follow-up UCx (>100,000 GNR). RVP, blood cultures, and nasal MRSA negative. Nasal MSSA positive  7. Heme: hold apixaban given questionable GI bleed on admission. Check stool FOBT. S/p disimpaction with stercoral colitis. GI eval  8. Endo: no active issues  9. Skin: no lines or oneill  10. Dispo: full code, prognosis guarded, discussed with daughter at bedside
-    86M PMH pulmonary fibrosis (on home 3L nasal cannula), DM, a fib (on eliquis), mild dementia (baseline mental status AAOx1-2), chronic constipation, dysphagia, GERD, and malnutrition who presents with altered mental status and GI bleeding due to stercoral colitis from nursing home, s/p disimpaction. Found incidentally to have small pneumothorax not requiring chest tube placement. He is on baseline home oxygen. Also with Pseudomonas aeruginosa UTI with acute pyelonephritis, on Zosyn. RVP, blood cultures, and nasal MRSA negative.     1. Neuro: acute encephalopathy likely due to sepsis. Continue home sertraline. Lethargic today due to alprazolam given this morning. Will try to minimize benzodiazepines. Start quetiapine 12.5 mg at bedtime  2. CV: HD stable, continue diltiazem per home regimen. Restart apixaban if ok with GI and no further bleeding. IVF hydration today with D5LR@50mL/hr for 24 hours given minimal po intake today  3. Pulm: underlying pulmonary fibrosis with superimposed patchy and consolidative opacities suggestive of infection/inflammation. Has productive cough with noted cachexia on exam. Continue empiric Zosyn. Chest PT. Airway clearance therapy with chest vest, levalbuterol, ipratropium, and hypertonic saline. D/c methylprednisolone given encephalopathy and active infection. Supplemental oxygen with NC@3-4 LPM, goal SpO2>90%. Tiny PTX on chest CT, hold off on further intervention at this time  4. GI: continue pureed with moderately thickened liquids. Plan for MBS today. Bowel regimen with senna, miralax, dulcolax suppository. Consider mineral oil enema as necessary for constipation. GI bleed likely due to stercoral colitis, but no active bleeding. H/H stable  5. Renal: stable kidney function and lytes, strict I/Os  6. ID: Pseudomonas aeruginosa UTI with bilateral acute pyelonephritis and possible superimposed pneumonia. Continue Zosyn day 4/7. RVP, blood cultures, and nasal MRSA negative. Nasal MSSA positive  7. Heme: will restart apixaban if no further bleeding, H/H stable, and ok with GI  8. Endo: no active issues  9. Skin: no lines or oneill  10. Dispo: full code, prognosis guarded, discussed with daughter at bedside. Palliative care evaluation. Family requesting to go home with home hospice. Very sad and unfortunate case
-    86M PMH pulmonary fibrosis (on home 3L nasal cannula), DM, a fib (on eliquis), mild dementia (baseline mental status AAOx1-2), chronic constipation, dysphagia, GERD, and malnutrition who presents with acute encephalopathy and GI bleeding due to stercoral colitis from nursing home, s/p disimpaction. Found incidentally to have small pneumothorax not requiring chest tube placement. Now with worsening hypoxemic and hypercapnic respiratory failure due to superimposed aspiration pneumonia. Also found to have Pseudomonas aeruginosa UTI with acute pyelonephritis, on Zosyn. Appears to be approaching end of life and actively dying. He is DNR/DNI.    - I discussed with family at bedside and they are requesting that patient's comfort be optimized in this dying process  - Morphine prn pain/dyspnea  - Alprazolam prn anxiety/agitation  - Follow-up palliative care  - Avoid further BiPAP per family request given patient's discomfort  - Supplemental oxygen with Venti mask at 50% or 100% NRB if hypoxemia  - Plan for family meeting in AM tomorrow regarding Supplemental oxygent with nasal cannula or venti mask for goal SpO2>90%  - Nasotracheal/Orotracheal suctioning as necessary for thick upper respiratory secretions, but try to minimize given patient's discomfort  - Airway clearance therapy with chest PT, chest vest, levalbuterol, ipratropium, and hypertonic saline  - Continue empiric Zosyn for aspiration pneumonia + Pseudomonas aeruginosa UTI with acute pyelonephritis  - HD stable currently, continue metoprolol IV  - Change fluids to LR@65mL/hr  - Discussed with family regarding feeding tube and they are in agreement that this would be against his wishes  - Hold steroids at this time  - Bowel regimen for stool impaction with stercoral colitis  - Very sad case, discussed with family at bedside, DNR/DNI

## 2022-07-21 NOTE — PROGRESS NOTE ADULT - PROBLEM SELECTOR PLAN 5
Pt is now DNR/DNI - by primary team  Plan is family meeting at 10AM tomorrow 7/22 to determine next steps and explore option of hospice  Support provided  Chaplaincy following

## 2022-07-21 NOTE — PROVIDER CONTACT NOTE (OTHER) - BACKGROUND
Admitted for GI bleed.
Patient admitted for abdominal pain
Patient admitted with abdominal pain.
Pulmonary fibrosis, Dysphagia, GERD, HTN, DM, abdominal pain
Patient admitted with abdominal pain.
Admitted for GI bleed.
Patient admitted with abdominal pain.
87 yo male admitted from nursing home for ams, found to have pericardial effusion, uti and became hypercapnic

## 2022-07-21 NOTE — PROGRESS NOTE ADULT - SUBJECTIVE AND OBJECTIVE BOX
CHIEF COMPLAINT: Patient is a 86y old  Male who presents with a chief complaint of altered mental status (20 Jul 2022 07:03)      INTERVAL EVENTS:     ROS: Seen by bedside during AM rounds     OBJECTIVE:  ICU Vital Signs Last 24 Hrs  T(C): 35.8 (21 Jul 2022 06:12), Max: 37.1 (20 Jul 2022 14:02)  T(F): 96.5 (21 Jul 2022 06:12), Max: 98.7 (20 Jul 2022 14:02)  HR: 92 (21 Jul 2022 04:35) (92 - 116)  BP: 124/55 (21 Jul 2022 04:35) (124/55 - 146/58)  BP(mean): 76 (21 Jul 2022 04:35) (76 - 89)  ABP: --  ABP(mean): --  RR: 18 (21 Jul 2022 04:35) (16 - 18)  SpO2: 100% (21 Jul 2022 04:35) (98% - 100%)    O2 Parameters below as of 21 Jul 2022 04:35  Patient On (Oxygen Delivery Method): BiPAP/CPAP    O2 Concentration (%): 80          07-20 @ 07:01  -  07-21 @ 07:00  --------------------------------------------------------  IN: 1760 mL / OUT: 600 mL / NET: 1160 mL      CAPILLARY BLOOD GLUCOSE      POCT Blood Glucose.: 311 mg/dL (21 Jul 2022 06:16)      PHYSICAL EXAM:  General:   HEENT:   Lymph Nodes:  Neck:   Respiratory:   Cardiovascular:   Abdomen:   Extremities:   Skin:   Neurological:  Psychiatry:        HOSPITAL MEDICATIONS:  MEDICATIONS  (STANDING):  acetylcysteine 20%  Inhalation 4 milliLiter(s) Inhalation every 12 hours  buDESOnide    Inhalation Suspension 0.5 milliGRAM(s) Inhalation daily  chlorhexidine 2% Cloths 1 Application(s) Topical daily  dextrose 5% + sodium chloride 0.9%. 1000 milliLiter(s) (65 mL/Hr) IV Continuous <Continuous>  dextrose 5%. 1000 milliLiter(s) (100 mL/Hr) IV Continuous <Continuous>  dextrose 5%. 1000 milliLiter(s) (50 mL/Hr) IV Continuous <Continuous>  dextrose 50% Injectable 25 Gram(s) IV Push once  dextrose 50% Injectable 12.5 Gram(s) IV Push once  dextrose 50% Injectable 25 Gram(s) IV Push once  diltiazem    milliGRAM(s) Oral daily  glucagon  Injectable 1 milliGRAM(s) IntraMuscular once  insulin lispro (ADMELOG) corrective regimen sliding scale   SubCutaneous every 6 hours  levalbuterol Inhalation 0.63 milliGRAM(s) Inhalation every 6 hours  metoprolol tartrate Injectable 2.5 milliGRAM(s) IV Push every 8 hours  mupirocin 2% Ointment 1 Application(s) Topical two times a day  pantoprazole    Tablet 40 milliGRAM(s) Oral before breakfast  piperacillin/tazobactam IVPB.. 3.375 Gram(s) IV Intermittent every 8 hours  polyethylene glycol 3350 17 Gram(s) Oral daily  senna 8.6 milliGRAM(s) Oral Tablet - Peds 1 Tablet(s) Oral at bedtime  sertraline 25 milliGRAM(s) Oral daily  sodium chloride 3%  Inhalation 4 milliLiter(s) Inhalation every 6 hours    MEDICATIONS  (PRN):  acetaminophen     Tablet .. 650 milliGRAM(s) Oral every 6 hours PRN Temp greater or equal to 38C (100.4F), Moderate Pain (4 - 6)  ALPRAZolam 0.25 milliGRAM(s) Oral every 8 hours PRN agitation/anxiety  aluminum hydroxide/magnesium hydroxide/simethicone Suspension 30 milliLiter(s) Oral every 6 hours PRN Dyspepsia  bisacodyl 5 milliGRAM(s) Oral at bedtime PRN Constipation  bisacodyl Suppository 10 milliGRAM(s) Rectal daily PRN Constipation  dextrose Oral Gel 15 Gram(s) Oral once PRN Blood Glucose LESS THAN 70 milliGRAM(s)/deciliter  morphine  - Injectable 0.5 milliGRAM(s) IV Push every 4 hours PRN dyspnea/pain/increased work of breathing      LABS:                        9.6    x     )-----------( 164      ( 21 Jul 2022 05:00 )             32.4     07-20    142  |  105  |  20  ----------------------------<  599<HH>  4.7   |  31  |  0.59    Ca    8.0<L>      20 Jul 2022 04:56  Phos  3.6     07-20  Mg     1.70     07-20    TPro  5.7<L>  /  Alb  2.7<L>  /  TBili  0.7  /  DBili  x   /  AST  10  /  ALT  15  /  AlkPhos  102  07-19        Arterial Blood Gas:  07-20 @ 12:41  7.31/78/116/39/99.1/9.8  ABG lactate: --  Arterial Blood Gas:  07-20 @ 07:43  7.33/74/157/39/99.5/10.0  ABG lactate: --  Arterial Blood Gas:  07-20 @ 00:56  7.20/103/76/40/91.0/8.2  ABG lactate: --  Arterial Blood Gas:  07-19 @ 21:30  7.21/106/125/42/99.0/10.1  ABG lactate: --  Arterial Blood Gas:  07-19 @ 15:56  7.36/71/73/40/95.7/11.5  ABG lactate: --     CHIEF COMPLAINT: Patient is a 86y old  Male who presents with a chief complaint of altered mental status (20 Jul 2022 07:03)      INTERVAL EVENTS: Hypothermic overnight. Less responsive today.    ROS: Unable to obtain ROS given patient is obtunded    OBJECTIVE:  ICU Vital Signs Last 24 Hrs  T(C): 35.8 (21 Jul 2022 06:12), Max: 37.1 (20 Jul 2022 14:02)  T(F): 96.5 (21 Jul 2022 06:12), Max: 98.7 (20 Jul 2022 14:02)  HR: 92 (21 Jul 2022 04:35) (92 - 116)  BP: 124/55 (21 Jul 2022 04:35) (124/55 - 146/58)  BP(mean): 76 (21 Jul 2022 04:35) (76 - 89)  ABP: --  ABP(mean): --  RR: 18 (21 Jul 2022 04:35) (16 - 18)  SpO2: 100% (21 Jul 2022 04:35) (98% - 100%)    O2 Parameters below as of 21 Jul 2022 04:35  Patient On (Oxygen Delivery Method): BiPAP/CPAP    O2 Concentration (%): 80          07-20 @ 07:01  -  07-21 @ 07:00  --------------------------------------------------------  IN: 1760 mL / OUT: 600 mL / NET: 1160 mL      CAPILLARY BLOOD GLUCOSE      POCT Blood Glucose.: 311 mg/dL (21 Jul 2022 06:16)      PHYSICAL EXAM:  General: NAD   Neck: Trachea midline.  Cards: S1/S2, no murmurs   Pulm: Diminished b/l.  Abdomen: Soft, NTND.   Extremities: No pedal edema. Extremities warm to touch.  Neurology: Obtunded. Unresponsive to verbal or tactile stimuli.  Skin: refer to RN assessment.         HOSPITAL MEDICATIONS:  MEDICATIONS  (STANDING):  acetylcysteine 20%  Inhalation 4 milliLiter(s) Inhalation every 12 hours  buDESOnide    Inhalation Suspension 0.5 milliGRAM(s) Inhalation daily  chlorhexidine 2% Cloths 1 Application(s) Topical daily  dextrose 5% + sodium chloride 0.9%. 1000 milliLiter(s) (65 mL/Hr) IV Continuous <Continuous>  dextrose 5%. 1000 milliLiter(s) (100 mL/Hr) IV Continuous <Continuous>  dextrose 5%. 1000 milliLiter(s) (50 mL/Hr) IV Continuous <Continuous>  dextrose 50% Injectable 25 Gram(s) IV Push once  dextrose 50% Injectable 12.5 Gram(s) IV Push once  dextrose 50% Injectable 25 Gram(s) IV Push once  diltiazem    milliGRAM(s) Oral daily  glucagon  Injectable 1 milliGRAM(s) IntraMuscular once  insulin lispro (ADMELOG) corrective regimen sliding scale   SubCutaneous every 6 hours  levalbuterol Inhalation 0.63 milliGRAM(s) Inhalation every 6 hours  metoprolol tartrate Injectable 2.5 milliGRAM(s) IV Push every 8 hours  mupirocin 2% Ointment 1 Application(s) Topical two times a day  pantoprazole    Tablet 40 milliGRAM(s) Oral before breakfast  piperacillin/tazobactam IVPB.. 3.375 Gram(s) IV Intermittent every 8 hours  polyethylene glycol 3350 17 Gram(s) Oral daily  senna 8.6 milliGRAM(s) Oral Tablet - Peds 1 Tablet(s) Oral at bedtime  sertraline 25 milliGRAM(s) Oral daily  sodium chloride 3%  Inhalation 4 milliLiter(s) Inhalation every 6 hours    MEDICATIONS  (PRN):  acetaminophen     Tablet .. 650 milliGRAM(s) Oral every 6 hours PRN Temp greater or equal to 38C (100.4F), Moderate Pain (4 - 6)  ALPRAZolam 0.25 milliGRAM(s) Oral every 8 hours PRN agitation/anxiety  aluminum hydroxide/magnesium hydroxide/simethicone Suspension 30 milliLiter(s) Oral every 6 hours PRN Dyspepsia  bisacodyl 5 milliGRAM(s) Oral at bedtime PRN Constipation  bisacodyl Suppository 10 milliGRAM(s) Rectal daily PRN Constipation  dextrose Oral Gel 15 Gram(s) Oral once PRN Blood Glucose LESS THAN 70 milliGRAM(s)/deciliter  morphine  - Injectable 0.5 milliGRAM(s) IV Push every 4 hours PRN dyspnea/pain/increased work of breathing      LABS:                        9.6    x     )-----------( 164      ( 21 Jul 2022 05:00 )             32.4     07-20    142  |  105  |  20  ----------------------------<  599<HH>  4.7   |  31  |  0.59    Ca    8.0<L>      20 Jul 2022 04:56  Phos  3.6     07-20  Mg     1.70     07-20    TPro  5.7<L>  /  Alb  2.7<L>  /  TBili  0.7  /  DBili  x   /  AST  10  /  ALT  15  /  AlkPhos  102  07-19        Arterial Blood Gas:  07-20 @ 12:41  7.31/78/116/39/99.1/9.8  ABG lactate: --  Arterial Blood Gas:  07-20 @ 07:43  7.33/74/157/39/99.5/10.0  ABG lactate: --  Arterial Blood Gas:  07-20 @ 00:56  7.20/103/76/40/91.0/8.2  ABG lactate: --  Arterial Blood Gas:  07-19 @ 21:30  7.21/106/125/42/99.0/10.1  ABG lactate: --  Arterial Blood Gas:  07-19 @ 15:56  7.36/71/73/40/95.7/11.5  ABG lactate: --

## 2022-07-21 NOTE — DISCHARGE NOTE FOR THE EXPIRED PATIENT - HOSPITAL COURSE
87 yo M with PMH pulmonary fibrosis (on home 3L nasal cannula), DM, a fib (on eliquis), dementia (baseline mental status Aox1), chronic constipation, dysphagia, GERD, and malnutrition who presents with altered mental status and suspicion of GI bleed from nursing home. Found to have pneumothorax incidentally on imaging, hemodynamically stable and on home O2 of nasal cannula 4 L. Admitted to Respiratory Care Unit for monitoring.    # NEURO  - baseline mental status Aox1   - hold home sertraline while NPO  - home home xanax prn while NPO  - frequent reorientation  - CT head unremarkable    # CARDIOVASCULAR: recently dx with a fib   - hold eliquis in setting of possible GI bleed   - hold diltiazem while NPO    # RESPIRATORY: found to have small L PTX incidentally on CT abd. Hemodynamically stable, no increase in O2 requirement.  - continue home nasal cannula 4L  - continue home inhalers: incruse ellipta, pulmicort, duoneb  - was on prednisone 20 mg PO daily, cannot tolerate PO meds currently  - start solumedrol 20 mg daily   - serial CXR: first 4 hours after CT chest done     # INFECTIOUS DISEASE  - WBC 18 with lethargy likely due to infection  - monitor fever curve, wbc count  - s/p 1 dose unasyn 7/15  - start zosyn 7/15  - start vancomycin 7/15  - follow up UA, urine culture, blood culture     # GI: hx of colitis, CT AP with significant stool burden, s/p disimpaction in ED 7/15. Also suspicion of GI bleed given dark stools noted in nursing home, although hemoglobin stable at baseline compared to CBC 2 weeks prior.  - continue enema   - hold PO bowel regimen while NPO  - NPO  - speech and swallow eval  - IVF 50 cc/hr while NPO    # HEME  - sent from nursing home with black stool and suspicion of GI bleed  - hold eliquis (for a fib) in setting of possible bleed  - monitor stool color  - serial CBC   - coags    # ENDOCRINE  - hx of DM   - hold repaglinide while inpatient      # GOC  - discussed with daughter at bedside, she will discuss with rest of her siblings and follow up.  - Palliative family meeting regarding hospice 7/222 @ 10AM       DVT ppx: hold in setting of possible GI bleed

## 2022-07-21 NOTE — PROGRESS NOTE ADULT - PROBLEM SELECTOR PLAN 3
Currently DNR/DNI  Planned family meeting tomorrow at 10AM to further address management and recommendations

## 2022-07-21 NOTE — PROGRESS NOTE ADULT - ASSESSMENT
87 yo M with PMH pulmonary fibrosis (on home 3L nasal cannula), DM, a fib (on eliquis), dementia (baseline mental status Aox1), chronic constipation, dysphagia, GERD, and malnutrition who presents with altered mental status and suspicion of GI bleed from nursing home. Found to have pneumothorax incidentally on imaging, hemodynamically stable and on home O2 of nasal cannula 4 L. Admitted to [facility] for monitoring.      # NEURO  - Baseline mental status Aox1 per daughter, However today, Alert and oriented x 0  - c/w home sertraline and Hold home xanax prn for now in settings of acute worsening mental status    - CT head -No acute transcortical infarct or intracranial hemorrhage. White matter small vessel disease.  - Ammonia wnl     # CARDIOVASCULAR: recently dx with a fib   - hold Eliquis in setting of possible GI bleed   - Will hold Home dose Cardizem 240mg PO daily for now since NPO  - Will start IV Lopressor 2.5mg IV q8hrs  - Monitor HR/BP closely    # RESPIRATORY  - found to have small L PTX incidentally on CT abd. Hemodynamically stable, no increase in O2 requirement.  - continue home inhalers: incruse ellipta, pulmicort, and Hypersal  - c/w Xopenex - HR much improved   - d'storm solumedrol 20 mg daily on 7/18  - CT chest as noted above   - Rpt CXR - Stable small left pneumothorax and diffuse bilateral patchy and reticular opacities.  - ABG pH 7.36, pC02 71, HCO3 40  - Will start patient on BIPAP 10/5, FIO2 35% - will adjust as needed in settings of increase work of breathing  - Tolerated some VentiMask during the daytime (7/20)   - Family preferring to keep patient on VentiMask even at nightteam given more tolerable.     # INFECTIOUS DISEASE  - Leukocytosis up trending again 18>>13>>12>>10>>14>>19 with lethargy   - monitor fever curve, wbc count  - s/p 1 dose unasyn 7/15, and Vanco d'storm on 7/18 (MRSA PCR Neg)   - Staph areus + - started on Bactroban 1app BID for 5 days   - UA+/ urine cx >100k GNR, +pseudomona & c/w Zosyn (7/15 - ...)  - Worsening Leukocytosis (7/19) & s/p one dose of Vanco 1g on 7/19  - Lactate wnl     # GI: hx of colitis, CT AP with significant stool burden, s/p disimpaction in ED 7/15. Also suspicion of GI bleed given dark stools noted in nursing home, although hemoglobin stable at baseline compared to CBC 2 weeks prior.  - continue enema   - Rpt Speech and Swallow done 7/18 - Pureed with Moderate thick liquid - Will consider CINE when appropriate   - NPO for now due to worsening mental status  - c/w D5NS @65cc/hr  - Family declining NGT placement (7/20)    # HEME  - sent from nursing home with black stool and suspicion of GI bleed  - hold Eliquis (for a fib) in setting of possible bleed  - monitor stool color  - serial CBC   +FOBT, GI following, no intervention at this time. - Family does not wish any invasive procedures    # ENDOCRINE  - hx of DM   - hold repaglinide while inpatient  - ISS q 6 hr if needed    #   +Varma - monitor UOP closely  - Hyperkalemia noted in evening lab - K 5.9  s/p Regular insulin 10 and D50 25 gram   - Potassium now normalized     # GOC  - discussed with daughter at bedside and over the phone (Beti/Mena) - Pt is now DNR/DNI - MOLST in chart. Family requesting hospice eval - sent 7/19.     # DVT ppx: hold in setting of possible GI bleed, SCDs       85 yo M with PMH pulmonary fibrosis (on home 3L nasal cannula), DM, a fib (on eliquis), dementia (baseline mental status Aox1), chronic constipation, dysphagia, GERD, and malnutrition who presents with altered mental status and suspicion of GI bleed from nursing home. Found to have pneumothorax incidentally on imaging, hemodynamically stable and on home O2 of nasal cannula 4 L. Admitted to RCU for monitoring.      # NEURO  - Baseline mental status Aox1 per daughter, However today, Alert and oriented x 0  - c/w home sertraline and Hold home xanax prn for now in settings of acute worsening mental status    - CT head -No acute transcortical infarct or intracranial hemorrhage. White matter small vessel disease.  - Ammonia wnl     # CARDIOVASCULAR: recently dx with a fib   - hold Eliquis in setting of possible GI bleed   - Will hold Home dose Cardizem 240mg PO daily for now since NPO  - Will start IV Lopressor 2.5mg IV q8hrs  - Monitor HR/BP closely    # RESPIRATORY  - found to have small L PTX incidentally on CT abd. Hemodynamically stable, no increase in O2 requirement.  - continue home inhalers: incruse ellipta, pulmicort, and Hypersal  - c/w Xopenex - HR much improved   - d'storm solumedrol 20 mg daily on 7/18  - CT chest as noted above   - Rpt CXR - Stable small left pneumothorax and diffuse bilateral patchy and reticular opacities.  - ABG pH 7.36, pC02 71, HCO3 40  - Will start patient on BIPAP 10/5, FIO2 35% - will adjust as needed in settings of increase work of breathing  - Tolerated some VentiMask during the daytime (7/20)   - Family preferring to keep patient on VentiMask even at nightteam given more tolerable.     # INFECTIOUS DISEASE  - Leukocytosis up trending again 18>>13>>12>>10>>14>>19 with lethargy   - monitor fever curve, wbc count  - s/p 1 dose unasyn 7/15, and Vanco d'storm on 7/18 (MRSA PCR Neg)   - Staph areus + - started on Bactroban 1app BID for 5 days   - UA+/ urine cx >100k GNR, +pseudomona & c/w Zosyn (7/15 - ...)  - Worsening Leukocytosis (7/19) & s/p one dose of Vanco 1g on 7/19  - Lactate wnl     # GI: hx of colitis, CT AP with significant stool burden, s/p disimpaction in ED 7/15. Also suspicion of GI bleed given dark stools noted in nursing home, although hemoglobin stable at baseline compared to CBC 2 weeks prior.  - continue enema   - Rpt Speech and Swallow done 7/18 - Pureed with Moderate thick liquid - Will consider CINE when appropriate   - NPO for now due to worsening mental status  - c/w D5NS @65cc/hr  - Family declining NGT placement (7/20)    # HEME  - sent from nursing home with black stool and suspicion of GI bleed  - hold Eliquis (for a fib) in setting of possible bleed  - monitor stool color  - serial CBC   +FOBT, GI following, no intervention at this time. - Family does not wish any invasive procedures    # ENDOCRINE  - hx of DM   - hold repaglinide while inpatient  - ISS q 6 hr if needed    #   +Varma - monitor UOP closely  - Hyperkalemia noted in evening lab - K 5.9  s/p Regular insulin 10 and D50 25 gram   - Potassium now normalized     # GOC  - discussed with daughter at bedside and over the phone (Beti/Mena) - Pt is now DNR/DNI - MOLST in chart. Family requesting hospice eval - sent 7/19. Daughters (Mena & Loida) updated at bedside again 7/21.     # DVT ppx: hold in setting of possible GI bleed, SCDs

## 2022-07-21 NOTE — PROVIDER CONTACT NOTE (OTHER) - SITUATION
Patient restless and agitated, pulling at oneill stating he does not need it anymore.
Patient c/o abdominal pain/discomfort
Pt .
during routine vital sign, low axillary temp noted
Patient with complaint of constipation. Daughter at bedside stated patient was receiving suppository regularly to aid with bowel movements.
Patient with bright red blood noted in bowel movement.
Pt c/o chest pain.
Pt temperature 96.3F.

## 2022-07-21 NOTE — PROVIDER CONTACT NOTE (CHANGE IN STATUS NOTIFICATION) - SITUATION
patient noted with increased agonal breathing, o2 saturation decreased to 36%, manual radial pulse take with 29 bpm noted. manual bp did not  any heart beat

## 2022-07-22 LAB
-  AMIKACIN: SIGNIFICANT CHANGE UP
-  AMOXICILLIN/CLAVULANIC ACID: SIGNIFICANT CHANGE UP
-  AMPICILLIN/SULBACTAM: SIGNIFICANT CHANGE UP
-  AMPICILLIN: SIGNIFICANT CHANGE UP
-  AZTREONAM: SIGNIFICANT CHANGE UP
-  CEFAZOLIN: SIGNIFICANT CHANGE UP
-  CEFEPIME: SIGNIFICANT CHANGE UP
-  CEFOXITIN: SIGNIFICANT CHANGE UP
-  CEFTRIAXONE: SIGNIFICANT CHANGE UP
-  CIPROFLOXACIN: SIGNIFICANT CHANGE UP
-  ERTAPENEM: SIGNIFICANT CHANGE UP
-  GENTAMICIN: SIGNIFICANT CHANGE UP
-  IMIPENEM: SIGNIFICANT CHANGE UP
-  LEVOFLOXACIN: SIGNIFICANT CHANGE UP
-  MEROPENEM: SIGNIFICANT CHANGE UP
-  PIPERACILLIN/TAZOBACTAM: SIGNIFICANT CHANGE UP
-  TOBRAMYCIN: SIGNIFICANT CHANGE UP
-  TRIMETHOPRIM/SULFAMETHOXAZOLE: SIGNIFICANT CHANGE UP
METHOD TYPE: SIGNIFICANT CHANGE UP

## 2022-07-23 LAB
-  AMPICILLIN/SULBACTAM: SIGNIFICANT CHANGE UP
-  CEFAZOLIN: SIGNIFICANT CHANGE UP
-  CLINDAMYCIN: SIGNIFICANT CHANGE UP
-  ERYTHROMYCIN: SIGNIFICANT CHANGE UP
-  GENTAMICIN: SIGNIFICANT CHANGE UP
-  OXACILLIN: SIGNIFICANT CHANGE UP
-  PENICILLIN: SIGNIFICANT CHANGE UP
-  RIFAMPIN: SIGNIFICANT CHANGE UP
-  TETRACYCLINE: SIGNIFICANT CHANGE UP
-  TRIMETHOPRIM/SULFAMETHOXAZOLE: SIGNIFICANT CHANGE UP
-  VANCOMYCIN: SIGNIFICANT CHANGE UP
CULTURE RESULTS: SIGNIFICANT CHANGE UP
METHOD TYPE: SIGNIFICANT CHANGE UP
ORGANISM # SPEC MICROSCOPIC CNT: SIGNIFICANT CHANGE UP
SPECIMEN SOURCE: SIGNIFICANT CHANGE UP

## 2024-10-07 NOTE — DISCHARGE NOTE NURSING/CASE MANAGEMENT/SOCIAL WORK - PATIENT PORTAL LINK FT
You can access the FollowMyHealth Patient Portal offered by [facility] by registering at the following website: [email or web address]. By joining [employer]’s FollowMyHealth portal, you will also be able to view your health information using other applications (apps) compatible with our system.
Home

## 2024-10-20 NOTE — PROGRESS NOTE ADULT - PROBLEM/PLAN-1
DISPLAY PLAN FREE TEXT
epigastric area